# Patient Record
Sex: FEMALE | Race: WHITE | NOT HISPANIC OR LATINO | Employment: UNEMPLOYED | URBAN - METROPOLITAN AREA
[De-identification: names, ages, dates, MRNs, and addresses within clinical notes are randomized per-mention and may not be internally consistent; named-entity substitution may affect disease eponyms.]

---

## 2019-03-21 ENCOUNTER — HOSPITAL ENCOUNTER (OUTPATIENT)
Dept: OTHER | Facility: HOSPITAL | Age: 24
Setting detail: SPECIMEN
Discharge: HOME | End: 2019-03-21
Attending: SURGERY | Admitting: SURGERY

## 2021-06-14 ENCOUNTER — APPOINTMENT (OUTPATIENT)
Dept: CT IMAGING | Facility: HOSPITAL | Age: 26
End: 2021-06-14

## 2021-06-14 ENCOUNTER — HOSPITAL ENCOUNTER (OUTPATIENT)
Facility: HOSPITAL | Age: 26
Setting detail: OBSERVATION
Discharge: HOME | End: 2021-06-16
Attending: INTERNAL MEDICINE | Admitting: INTERNAL MEDICINE

## 2021-06-14 ENCOUNTER — APPOINTMENT (OUTPATIENT)
Dept: GENERAL RADIOLOGY | Facility: HOSPITAL | Age: 26
End: 2021-06-14

## 2021-06-14 DIAGNOSIS — F19.10 SUBSTANCE ABUSE (HCC): ICD-10-CM

## 2021-06-14 DIAGNOSIS — R07.9 CHEST PAIN, UNSPECIFIED TYPE: Primary | ICD-10-CM

## 2021-06-14 LAB
ALBUMIN SERPL-MCNC: 4.1 G/DL (ref 3.5–5.2)
ALBUMIN/GLOB SERPL: 1.1 G/DL
ALP SERPL-CCNC: 121 U/L (ref 39–117)
ALT SERPL W P-5'-P-CCNC: 192 U/L (ref 1–33)
AMPHET+METHAMPHET UR QL: POSITIVE
ANION GAP SERPL CALCULATED.3IONS-SCNC: 13 MMOL/L (ref 5–15)
AST SERPL-CCNC: 271 U/L (ref 1–32)
B-HCG UR QL: NEGATIVE
BACTERIA UR QL AUTO: ABNORMAL /HPF
BARBITURATES UR QL SCN: NEGATIVE
BASOPHILS # BLD AUTO: 0 10*3/MM3 (ref 0–0.2)
BASOPHILS NFR BLD AUTO: 0.5 % (ref 0–1.5)
BENZODIAZ UR QL SCN: NEGATIVE
BILIRUB SERPL-MCNC: 0.8 MG/DL (ref 0–1.2)
BILIRUB UR QL STRIP: ABNORMAL
BUN SERPL-MCNC: 6 MG/DL (ref 6–20)
BUN/CREAT SERPL: 9.5 (ref 7–25)
CALCIUM SPEC-SCNC: 9.2 MG/DL (ref 8.6–10.5)
CANNABINOIDS SERPL QL: POSITIVE
CHLORIDE SERPL-SCNC: 98 MMOL/L (ref 98–107)
CLARITY UR: ABNORMAL
CO2 SERPL-SCNC: 24 MMOL/L (ref 22–29)
COCAINE UR QL: NEGATIVE
COLOR UR: ABNORMAL
CREAT SERPL-MCNC: 0.63 MG/DL (ref 0.57–1)
CRP SERPL-MCNC: 0.83 MG/DL (ref 0–0.5)
D DIMER PPP FEU-MCNC: 0.74 MG/L (FEU) (ref 0–0.59)
D-LACTATE SERPL-SCNC: 1.2 MMOL/L (ref 0.5–2)
DEPRECATED RDW RBC AUTO: 40.3 FL (ref 37–54)
EOSINOPHIL # BLD AUTO: 0.2 10*3/MM3 (ref 0–0.4)
EOSINOPHIL NFR BLD AUTO: 5.3 % (ref 0.3–6.2)
ERYTHROCYTE [DISTWIDTH] IN BLOOD BY AUTOMATED COUNT: 16 % (ref 12.3–15.4)
ERYTHROCYTE [SEDIMENTATION RATE] IN BLOOD: 36 MM/HR (ref 0–20)
GFR SERPL CREATININE-BSD FRML MDRD: 113 ML/MIN/1.73
GLOBULIN UR ELPH-MCNC: 3.7 GM/DL
GLUCOSE SERPL-MCNC: 136 MG/DL (ref 65–99)
GLUCOSE UR STRIP-MCNC: NEGATIVE MG/DL
HCT VFR BLD AUTO: 34 % (ref 34–46.6)
HGB BLD-MCNC: 11.7 G/DL (ref 12–15.9)
HGB UR QL STRIP.AUTO: NEGATIVE
HOLD SPECIMEN: NORMAL
HYALINE CASTS UR QL AUTO: ABNORMAL /LPF
KETONES UR QL STRIP: ABNORMAL
LEUKOCYTE ESTERASE UR QL STRIP.AUTO: ABNORMAL
LYMPHOCYTES # BLD AUTO: 1.3 10*3/MM3 (ref 0.7–3.1)
LYMPHOCYTES NFR BLD AUTO: 40.2 % (ref 19.6–45.3)
MCH RBC QN AUTO: 24.7 PG (ref 26.6–33)
MCHC RBC AUTO-ENTMCNC: 34.3 G/DL (ref 31.5–35.7)
MCV RBC AUTO: 71.8 FL (ref 79–97)
METHADONE UR QL SCN: NEGATIVE
MONOCYTES # BLD AUTO: 0.4 10*3/MM3 (ref 0.1–0.9)
MONOCYTES NFR BLD AUTO: 12.9 % (ref 5–12)
NEUTROPHILS NFR BLD AUTO: 1.3 10*3/MM3 (ref 1.7–7)
NEUTROPHILS NFR BLD AUTO: 41.1 % (ref 42.7–76)
NITRITE UR QL STRIP: NEGATIVE
NRBC BLD AUTO-RTO: 0.2 /100 WBC (ref 0–0.2)
NT-PROBNP SERPL-MCNC: 21.9 PG/ML (ref 0–450)
OPIATES UR QL: NEGATIVE
OXYCODONE UR QL SCN: NEGATIVE
PH UR STRIP.AUTO: 5.5 [PH] (ref 5–8)
PLATELET # BLD AUTO: 226 10*3/MM3 (ref 140–450)
PMV BLD AUTO: 7.2 FL (ref 6–12)
POTASSIUM SERPL-SCNC: 4 MMOL/L (ref 3.5–5.2)
PROT SERPL-MCNC: 7.8 G/DL (ref 6–8.5)
PROT UR QL STRIP: ABNORMAL
QT INTERVAL: 363 MS
RBC # BLD AUTO: 4.73 10*6/MM3 (ref 3.77–5.28)
RBC # UR: ABNORMAL /HPF
REF LAB TEST METHOD: ABNORMAL
RENAL EPI CELLS #/AREA URNS HPF: ABNORMAL /HPF
SODIUM SERPL-SCNC: 135 MMOL/L (ref 136–145)
SP GR UR STRIP: 1.03 (ref 1–1.03)
SQUAMOUS #/AREA URNS HPF: ABNORMAL /HPF
TROPONIN T SERPL-MCNC: <0.01 NG/ML (ref 0–0.03)
UROBILINOGEN UR QL STRIP: ABNORMAL
WBC # BLD AUTO: 3.3 10*3/MM3 (ref 3.4–10.8)
WBC UR QL AUTO: ABNORMAL /HPF

## 2021-06-14 PROCEDURE — 80307 DRUG TEST PRSMV CHEM ANLYZR: CPT | Performed by: NURSE PRACTITIONER

## 2021-06-14 PROCEDURE — 25010000002 DIPHENHYDRAMINE PER 50 MG: Performed by: NURSE PRACTITIONER

## 2021-06-14 PROCEDURE — C9803 HOPD COVID-19 SPEC COLLECT: HCPCS

## 2021-06-14 PROCEDURE — 93005 ELECTROCARDIOGRAM TRACING: CPT

## 2021-06-14 PROCEDURE — 81001 URINALYSIS AUTO W/SCOPE: CPT | Performed by: NURSE PRACTITIONER

## 2021-06-14 PROCEDURE — 0 IOPAMIDOL PER 1 ML: Performed by: NURSE PRACTITIONER

## 2021-06-14 PROCEDURE — G0378 HOSPITAL OBSERVATION PER HR: HCPCS

## 2021-06-14 PROCEDURE — 85379 FIBRIN DEGRADATION QUANT: CPT | Performed by: NURSE PRACTITIONER

## 2021-06-14 PROCEDURE — 84484 ASSAY OF TROPONIN QUANT: CPT | Performed by: NURSE PRACTITIONER

## 2021-06-14 PROCEDURE — 71275 CT ANGIOGRAPHY CHEST: CPT

## 2021-06-14 PROCEDURE — 71045 X-RAY EXAM CHEST 1 VIEW: CPT

## 2021-06-14 PROCEDURE — 96375 TX/PRO/DX INJ NEW DRUG ADDON: CPT

## 2021-06-14 PROCEDURE — 25010000002 VANCOMYCIN 10 G RECONSTITUTED SOLUTION: Performed by: NURSE PRACTITIONER

## 2021-06-14 PROCEDURE — 85025 COMPLETE CBC W/AUTO DIFF WBC: CPT | Performed by: NURSE PRACTITIONER

## 2021-06-14 PROCEDURE — 87040 BLOOD CULTURE FOR BACTERIA: CPT | Performed by: NURSE PRACTITIONER

## 2021-06-14 PROCEDURE — 87086 URINE CULTURE/COLONY COUNT: CPT | Performed by: NURSE PRACTITIONER

## 2021-06-14 PROCEDURE — 99220 PR INITIAL OBSERVATION CARE/DAY 70 MINUTES: CPT | Performed by: INTERNAL MEDICINE

## 2021-06-14 PROCEDURE — 93005 ELECTROCARDIOGRAM TRACING: CPT | Performed by: INTERNAL MEDICINE

## 2021-06-14 PROCEDURE — U0004 COV-19 TEST NON-CDC HGH THRU: HCPCS | Performed by: INTERNAL MEDICINE

## 2021-06-14 PROCEDURE — P9612 CATHETERIZE FOR URINE SPEC: HCPCS

## 2021-06-14 PROCEDURE — 96374 THER/PROPH/DIAG INJ IV PUSH: CPT

## 2021-06-14 PROCEDURE — 25010000002 CEFTRIAXONE PER 250 MG: Performed by: NURSE PRACTITIONER

## 2021-06-14 PROCEDURE — 83605 ASSAY OF LACTIC ACID: CPT

## 2021-06-14 PROCEDURE — 86140 C-REACTIVE PROTEIN: CPT | Performed by: NURSE PRACTITIONER

## 2021-06-14 PROCEDURE — 25010000002 METHYLPREDNISOLONE PER 125 MG: Performed by: NURSE PRACTITIONER

## 2021-06-14 PROCEDURE — 81025 URINE PREGNANCY TEST: CPT | Performed by: NURSE PRACTITIONER

## 2021-06-14 PROCEDURE — 99284 EMERGENCY DEPT VISIT MOD MDM: CPT

## 2021-06-14 PROCEDURE — 83880 ASSAY OF NATRIURETIC PEPTIDE: CPT | Performed by: NURSE PRACTITIONER

## 2021-06-14 PROCEDURE — 80053 COMPREHEN METABOLIC PANEL: CPT | Performed by: NURSE PRACTITIONER

## 2021-06-14 PROCEDURE — 85652 RBC SED RATE AUTOMATED: CPT | Performed by: NURSE PRACTITIONER

## 2021-06-14 RX ORDER — METHYLPREDNISOLONE SODIUM SUCCINATE 125 MG/2ML
125 INJECTION, POWDER, LYOPHILIZED, FOR SOLUTION INTRAMUSCULAR; INTRAVENOUS ONCE
Status: COMPLETED | OUTPATIENT
Start: 2021-06-14 | End: 2021-06-14

## 2021-06-14 RX ORDER — DIPHENHYDRAMINE HYDROCHLORIDE 50 MG/ML
25 INJECTION INTRAMUSCULAR; INTRAVENOUS ONCE
Status: COMPLETED | OUTPATIENT
Start: 2021-06-14 | End: 2021-06-14

## 2021-06-14 RX ORDER — SODIUM CHLORIDE 0.9 % (FLUSH) 0.9 %
10 SYRINGE (ML) INJECTION AS NEEDED
Status: DISCONTINUED | OUTPATIENT
Start: 2021-06-14 | End: 2021-06-16 | Stop reason: HOSPADM

## 2021-06-14 RX ADMIN — VANCOMYCIN HYDROCHLORIDE 1500 MG: 10 INJECTION, POWDER, LYOPHILIZED, FOR SOLUTION INTRAVENOUS at 22:40

## 2021-06-14 RX ADMIN — METHYLPREDNISOLONE SODIUM SUCCINATE 125 MG: 125 INJECTION, POWDER, FOR SOLUTION INTRAMUSCULAR; INTRAVENOUS at 19:41

## 2021-06-14 RX ADMIN — WATER 2 G: 100 INJECTION, SOLUTION INTRAVENOUS at 22:40

## 2021-06-14 RX ADMIN — DIPHENHYDRAMINE HYDROCHLORIDE 25 MG: 50 INJECTION INTRAMUSCULAR; INTRAVENOUS at 19:41

## 2021-06-14 RX ADMIN — IOPAMIDOL 100 ML: 755 INJECTION, SOLUTION INTRAVENOUS at 20:25
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
## 2021-06-15 LAB
ALBUMIN SERPL-MCNC: 3.7 G/DL (ref 3.5–5.2)
ALP SERPL-CCNC: 160 U/L (ref 39–117)
ALT SERPL W P-5'-P-CCNC: 237 U/L (ref 1–33)
AST SERPL-CCNC: 305 U/L (ref 1–32)
BACTERIA SPEC AEROBE CULT: ABNORMAL
BILIRUB CONJ SERPL-MCNC: 0.3 MG/DL (ref 0–0.3)
BILIRUB INDIRECT SERPL-MCNC: 0.2 MG/DL
BILIRUB SERPL-MCNC: 0.5 MG/DL (ref 0–1.2)
CK SERPL-CCNC: 60 U/L (ref 20–180)
GGT SERPL-CCNC: 59 U/L (ref 5–36)
HCV AB SER DONR QL: REACTIVE
HIV1+2 AB SER QL: NORMAL
PROT SERPL-MCNC: 7.1 G/DL (ref 6–8.5)
SARS-COV-2 ORF1AB RESP QL NAA+PROBE: NOT DETECTED

## 2021-06-15 PROCEDURE — G0378 HOSPITAL OBSERVATION PER HR: HCPCS

## 2021-06-15 PROCEDURE — 96372 THER/PROPH/DIAG INJ SC/IM: CPT

## 2021-06-15 PROCEDURE — 25010000002 VANCOMYCIN 1 G RECONSTITUTED SOLUTION 1 EACH VIAL: Performed by: INTERNAL MEDICINE

## 2021-06-15 PROCEDURE — 82977 ASSAY OF GGT: CPT | Performed by: NURSE PRACTITIONER

## 2021-06-15 PROCEDURE — 80076 HEPATIC FUNCTION PANEL: CPT | Performed by: INTERNAL MEDICINE

## 2021-06-15 PROCEDURE — 87522 HEPATITIS C REVRS TRNSCRPJ: CPT | Performed by: INTERNAL MEDICINE

## 2021-06-15 PROCEDURE — 86803 HEPATITIS C AB TEST: CPT | Performed by: INTERNAL MEDICINE

## 2021-06-15 PROCEDURE — 25010000002 ENOXAPARIN PER 10 MG: Performed by: INTERNAL MEDICINE

## 2021-06-15 PROCEDURE — 82550 ASSAY OF CK (CPK): CPT | Performed by: NURSE PRACTITIONER

## 2021-06-15 PROCEDURE — 99225 PR SBSQ OBSERVATION CARE/DAY 25 MINUTES: CPT | Performed by: INTERNAL MEDICINE

## 2021-06-15 PROCEDURE — G0432 EIA HIV-1/HIV-2 SCREEN: HCPCS | Performed by: NURSE PRACTITIONER

## 2021-06-15 RX ORDER — KETOROLAC TROMETHAMINE 30 MG/ML
30 INJECTION, SOLUTION INTRAMUSCULAR; INTRAVENOUS EVERY 6 HOURS PRN
Status: DISCONTINUED | OUTPATIENT
Start: 2021-06-15 | End: 2021-06-16 | Stop reason: HOSPADM

## 2021-06-15 RX ORDER — VALACYCLOVIR HYDROCHLORIDE 500 MG/1
1000 TABLET, FILM COATED ORAL EVERY 12 HOURS SCHEDULED
Status: DISCONTINUED | OUTPATIENT
Start: 2021-06-15 | End: 2021-06-16 | Stop reason: HOSPADM

## 2021-06-15 RX ORDER — ONDANSETRON 2 MG/ML
4 INJECTION INTRAMUSCULAR; INTRAVENOUS EVERY 6 HOURS PRN
Status: DISCONTINUED | OUTPATIENT
Start: 2021-06-15 | End: 2021-06-16 | Stop reason: HOSPADM

## 2021-06-15 RX ORDER — ACETAMINOPHEN 325 MG/1
650 TABLET ORAL EVERY 4 HOURS PRN
Status: DISCONTINUED | OUTPATIENT
Start: 2021-06-15 | End: 2021-06-15

## 2021-06-15 RX ORDER — PANTOPRAZOLE SODIUM 40 MG/1
40 TABLET, DELAYED RELEASE ORAL
Status: DISCONTINUED | OUTPATIENT
Start: 2021-06-15 | End: 2021-06-16 | Stop reason: HOSPADM

## 2021-06-15 RX ORDER — SODIUM CHLORIDE 0.9 % (FLUSH) 0.9 %
10 SYRINGE (ML) INJECTION EVERY 12 HOURS SCHEDULED
Status: DISCONTINUED | OUTPATIENT
Start: 2021-06-15 | End: 2021-06-16 | Stop reason: HOSPADM

## 2021-06-15 RX ORDER — LEVETIRACETAM 500 MG/1
500 TABLET ORAL EVERY 12 HOURS SCHEDULED
Status: DISCONTINUED | OUTPATIENT
Start: 2021-06-15 | End: 2021-06-15

## 2021-06-15 RX ORDER — LEVETIRACETAM 500 MG/1
500 TABLET ORAL EVERY 12 HOURS SCHEDULED
Status: DISCONTINUED | OUTPATIENT
Start: 2021-06-15 | End: 2021-06-16 | Stop reason: HOSPADM

## 2021-06-15 RX ORDER — SODIUM CHLORIDE 0.9 % (FLUSH) 0.9 %
10 SYRINGE (ML) INJECTION AS NEEDED
Status: DISCONTINUED | OUTPATIENT
Start: 2021-06-15 | End: 2021-06-16 | Stop reason: HOSPADM

## 2021-06-15 RX ADMIN — ENOXAPARIN SODIUM 40 MG: 40 INJECTION SUBCUTANEOUS at 15:57

## 2021-06-15 RX ADMIN — VALACYCLOVIR HYDROCHLORIDE 1000 MG: 500 TABLET ORAL at 21:59

## 2021-06-15 RX ADMIN — Medication 10 ML: at 09:46

## 2021-06-15 RX ADMIN — LEVETIRACETAM 500 MG: 500 TABLET, FILM COATED ORAL at 21:59

## 2021-06-15 RX ADMIN — Medication 10 ML: at 21:59

## 2021-06-15 RX ADMIN — Medication 10 ML: at 03:00

## 2021-06-15 RX ADMIN — PANTOPRAZOLE SODIUM 40 MG: 40 TABLET, DELAYED RELEASE ORAL at 10:00

## 2021-06-15 RX ADMIN — LEVETIRACETAM 500 MG: 500 TABLET, FILM COATED ORAL at 03:00

## 2021-06-15 RX ADMIN — VANCOMYCIN HYDROCHLORIDE 1000 MG: 1 INJECTION, POWDER, LYOPHILIZED, FOR SOLUTION INTRAVENOUS at 07:02

## 2021-06-15 RX ADMIN — LEVETIRACETAM 500 MG: 500 TABLET, FILM COATED ORAL at 09:46
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
## 2021-06-15 NOTE — PLAN OF CARE
Goal Outcome Evaluation:  Plan of Care Reviewed With: patient        Progress: no change  Outcome Summary: Pt admitted d/t chest pain recent hospitalization at [facility] d/t endocarditis. pt currently resting w/o pain. pt states she has been homeless for 10 years and unable to purchase medications. pt states she had x3 seizures unwitnessed.  pt to be seen by ID and GI no new complaints at this time. pt reminded to turn.

## 2021-06-15 NOTE — H&P
[facility] Medicine Services      Patient Name: Yoselyn Bonilla  : 1995  MRN: [id number]  Primary Care Physician: Cyndee, No Known  Date of admission: 2021    Patient Care Team:  Provider, No Known as PCP - General          Subjective   History Present Illness     Chief Complaint:   Chief Complaint   Patient presents with   • Chest Pain     chest pain for 2 days, soa, n/v pt also reports 3 seizures in last 24 hours not taking medication due to money reason pt states she smoked meth 2 days ago, doen not use iv drugs anymore.     History of Present Illness  25-year-old  female, homeless patient with history of IVDA , seizure disorder, hepatitis C, anxiety and depression, PTSD, and borderline personality disorder, but is not taking any medications including her Keppra, presented to the ED complaining of chest pain, shortness of breath, nausea, vomiting, and chills.  She also reports having 3 unwitnessed seizures in the last 24 hours.  Patient reports she last smoked meth 2 days ago and last injected 3 to 4 weeks ago.  She was recently seen at [facility] and treated for endocarditis.  Patient states that she was admitted there for about 4 to 5 days treated with antibiotics and when she was discharged she was given a prescription to continue antibiotics but she did not.  She also reports having burning urination today.  Denies any other complaint.    Emergency department course:  Afebrile, hemodynamically stable, no acute distress.  Physical examination per ED NP was unremarkable.  Labs were significant for elevated LFTs, CRP 0.83, D-dimer 0.74, WBC count 3.23 with neutrophils 1.30, and hemoglobin 11.7.  Urine analysis showed moderate leukocyte esterase, 3-5 WBCs and 1+ bacteria.  Urine drug screen positive for methamphetamine and THC.  Patient was given 2 g of Rocephin and 1500 mg of vancomycin.    ROS  Please refer to HPI. All other systems were  reviewed and were negative.     Personal History     Past Medical History:   Past Medical History:   Diagnosis Date   • Hepatitis C        Surgical History:    No past surgical history on file.        Family History: family history is not on file. Otherwise pertinent FHx was reviewed and unremarkable.     Social History:  Homeless.      Medications:  Prior to Admission medications    Not on File       Allergies:    Allergies   Allergen Reactions   • Morphine Anaphylaxis   • Peanut Oil Anaphylaxis   • Sulfa Antibiotics Anaphylaxis   • Topiramate Anaphylaxis   • Iodine Other (See Comments)   • Latex Hives   • Shellfish-Derived Products Rash       Objective   Objective     Vital Signs  Temp:  [97.9 °F (36.6 °C)] 97.9 °F (36.6 °C)  Heart Rate:  [94-96] 94  Resp:  [18-22] 22  BP: (106-128)/(60-79) 106/60  SpO2:  [95 %-100 %] 100 %  on   ;   Device (Oxygen Therapy): room air  Body mass index is 28.38 kg/m².    Physical Exam  General: WD, WN, alert and oriented x3, no acute distress.   Eyes:  Show anicteric sclerae, moist conjunctivae with no lig lag; PERRLA.  HENT:  Normocephalic, atraumatic, moist oral mucosa.  Neck: Supple, no bruit, no JVP, no thyroid or lymph node enlargement, trachea central,   Lungs:  Good air entry. Clear to auscultation.   Heart: RRR, no murmur or rub.   Abdomen:  Soft, mild suprapubic tenderness, not distended, no organomegaly, bowel sounds positive.   Extremities: No leg edema or joint swelling, no calf tenderness, normal range of movement, pedal pulses intact.   Skin: No rash, lesions, or ulcers.  Normal texture and turgor.  Neurology:  Grossly intact.   Psychiatric exam: Pleasant, cooperative, appropriate mood and affect, intact judgment and insight.    Results Review:  I have personally reviewed most recent lab results and radiology images and interpretations and agree with findings, most notably: As below.    Results from last 7 days   Lab Units 06/14/21  1807   WBC 10*3/mm3 3.30*    HEMOGLOBIN g/dL 11.7*   HEMATOCRIT % 34.0   PLATELETS 10*3/mm3 226     Results from last 7 days   Lab Units 06/14/21  1808 06/14/21  1807   SODIUM mmol/L  --  135*   POTASSIUM mmol/L  --  4.0   CHLORIDE mmol/L  --  98   CO2 mmol/L  --  24.0   BUN mg/dL  --  6   CREATININE mg/dL  --  0.63   GLUCOSE mg/dL  --  136*   CALCIUM mg/dL  --  9.2   ALT (SGPT) U/L  --  192*   AST (SGOT) U/L  --  271*   TROPONIN T ng/mL  --  <0.010   PROBNP pg/mL  --  21.9   LACTATE mmol/L 1.2  --      Estimated Creatinine Clearance: 133 mL/min (by C-G formula based on SCr of 0.63 mg/dL).  Brief Urine Lab Results  (Last result in the past 365 days)      Color   Clarity   Blood   Leuk Est   Nitrite   Protein   CREAT   Urine HCG        06/14/21 1827 Dark Yellow  Comment:  Result checked Cloudy  Comment:  Result checked Negative Moderate (2+) Negative Trace         06/14/21 1827               Negative           Microbiology Results (last 10 days)     ** No results found for the last 240 hours. **          ECG/EMG Results (most recent)     Procedure Component Value Units Date/Time    ECG 12 Lead [[id number]] Collected: 06/14/21 1741     Updated: 06/14/21 1818     QT Interval 363 ms     Narrative:      HEART RATE= 89  bpm  RR Interval= 676  ms  GA Interval= 160  ms  P Horizontal Axis= 14  deg  P Front Axis= 31  deg  QRSD Interval= 82  ms  QT Interval= 363  ms  QRS Axis= 39  deg  T Wave Axis= 36  deg  - NORMAL ECG -  Sinus rhythm  Electronically Signed By:   Date and Time of Study: 2021-06-14 17:41:04                  XR Chest 1 View    Result Date: 6/14/2021  No active disease.  Electronically Signed By-Deion Hyde MD On:6/14/2021 6:03 PM This report was finalized on [id number] by  Deion Hyde MD.    CT Chest Pulmonary Embolism    Result Date: 6/14/2021   1. There are no large emboli within the main, right main, or left main pulmonary arteries. There also is normal enhancement of the larger pulmonary arterial branch vessels. It is difficult to  exclude small emboli in the peripheral pulmonary arteries as discussed above. 2. There are nodular densities in the left lower lobe which are likely benign given the patient's age. 3. Mild splenomegaly. 4. Dependent atelectasis in the lung bases.  Electronically Signed By-Deion Hyde MD On:6/14/2021 8:45 PM This report was finalized on [id number] by  Deion Hyde MD.        Estimated Creatinine Clearance: 133 mL/min (by C-G formula based on SCr of 0.63 mg/dL).    Assessment/Plan   Assessment/Plan       Active Hospital Problems    Diagnosis  POA   • Chest pain [R07.9]  Yes      Resolved Hospital Problems   No resolved problems to display.     Assessment:  1.  Chest pain and shortness of breath.  -Recent hospitalization and treatment for bacterial endocarditis 2/2 IVDA.  -Patient did not take antibiotics prescribed for her upon discharge.    2.  Seizure disorder-not taking her Keppra.    3.  UTI.    4.  History of hepatitis C with elevated LFTs.    5.  Hx of IVDA, methamphetamine and cannabis abuse.    6.  Anxiety/depression/PTSD/borderline personality disorder.    Plan:  -Admit to med/surg.  -Consult infectious disease.  -Continue empiric antibiotic coverage with Rocephin and vancomycin pending blood and urine culture results and ID recommendations.  -Consult gastroenterology.  -Check hepatitis C RNA titer and hepatitis C antibody level.  -Resume patient's on Keppra 500 mg p.o. twice daily.  -Consult .  -DVT prophylaxis with subcutaneous Lovenox.            VTE Prophylaxis -   Mechanical Order History:     None      Pharmalogical Order History:     None          CODE STATUS:    There are no questions and answers to display.       This patient has been examined wearing appropriate Personal Protective Equipment and discussed with hospital infection control department. 06/14/21      I discussed the patient's findings and my recommendations with patient.      Signature:Electronically signed by Claudio Martinez  MD, 06/15/21, 1:24 AM EDT.      [facility] Hospitalist Team

## 2021-06-15 NOTE — PROGRESS NOTES
"      [facility] Medicine Services Daily Progress Note      Hospitalist Team  LOS 1 days      Patient Care Team:  Provider, No Known as PCP - General    Patient Location: 224/1      Subjective   Subjective     Chief Complaint / Subjective  Chief Complaint   Patient presents with   • Chest Pain     chest pain for 2 days, soa, n/v pt also reports 3 seizures in last 24 hours not taking medication due to money reason pt states she smoked meth 2 days ago, doen not use iv drugs anymore.         Brief Synopsis of Hospital Course/HPI  Additional review of documentation from the [facility] showed that endocarditis was actually ruled out at their facility and she was only supposed to be on antibiotics for ESBL E. coli UTI and she was to receive 1 dose of outpatient tobramycin is unclear if she actually took this.  Also She was set up to see hepatitis C clinic at [facility]      Date::    6/15/2021: Poor historian will not really answer questions.  Reports she has some mild chest pain.      Review of Systems   Constitutional: Negative for fever.   Cardiovascular: Positive for chest pain.         Objective   Objective      Vital Signs  Temp:  [97.9 °F (36.6 °C)-98.4 °F (36.9 °C)] 98.2 °F (36.8 °C)  Heart Rate:  [] 95  Resp:  [15-22] 18  BP: ()/(56-79) 95/61  Oxygen Therapy  SpO2: 99 %  Pulse Oximetry Type: Intermittent  Device (Oxygen Therapy): room air  Flowsheet Rows      First Filed Value   Admission Height  162.6 cm (64\") Documented at 06/14/2021 1724   Admission Weight  75 kg (165 lb 5.5 oz) Documented at 06/14/2021 1724        Intake & Output (last 3 days)       06/12 0701 - 06/13 0700 06/13 0701 - 06/14 0700 06/14 0701 - 06/15 0700 06/15 0701 - 06/16 0700    P.O.    480    Total Intake(mL/kg)    480 (6.4)    Net    +480                Lines, Drains & Airways    Active LDAs     Name:   Placement date:   Placement time:   Site:   Days:    Peripheral IV 06/15/21 " 0051 Right;Upper Arm   06/15/21    0051    Arm   less than 1                  Physical Exam:    Physical Exam  HENT:      Head: Normocephalic.   Cardiovascular:      Rate and Rhythm: Normal rate.   Pulmonary:      Effort: Pulmonary effort is normal. No respiratory distress.   Abdominal:      General: There is no distension.      Palpations: Abdomen is soft.   Skin:     General: Skin is warm.   Neurological:      Mental Status: She is alert.   Psychiatric:      Comments: Anxious               Procedures:              Results Review:     I reviewed the patient's new clinical results.      Lab Results (last 24 hours)     Procedure Component Value Units Date/Time    CK [[id number]]  (Normal) Collected: 06/15/21 0425    Specimen: Blood Updated: 06/15/21 1433     Creatine Kinase 60 U/L     Gamma GT [[id number]] Collected: 06/15/21 0425    Specimen: Blood Updated: 06/15/21 1421    Urine Culture - Urine, Urine, Catheter [[id number]]  (Normal) Collected: 06/14/21 1827    Specimen: Urine, Catheter Updated: 06/15/21 1057     Urine Culture Culture in progress    Hepatitis C Antibody [[id number]]  (Abnormal) Collected: 06/15/21 0425    Specimen: Blood Updated: 06/15/21 0519     Hepatitis C Ab Reactive    Narrative:      Results may be falsely decreased if patient taking Biotin.      Hepatic Function Panel [[id number]]  (Abnormal) Collected: 06/15/21 0425    Specimen: Blood Updated: 06/15/21 0511     Total Protein 7.1 g/dL      Albumin 3.70 g/dL      ALT (SGPT) 237 U/L      AST (SGOT) 305 U/L      Alkaline Phosphatase 160 U/L      Total Bilirubin 0.5 mg/dL      Bilirubin, Direct 0.3 mg/dL      Bilirubin, Indirect 0.2 mg/dL     Hepatitis C RNA, Quantitative, PCR (graph) [[id number]] Collected: 06/15/21 0425    Specimen: Blood Updated: 06/15/21 0435    COVID PRE-OP / PRE-PROCEDURE SCREENING ORDER (NO ISOLATION) - Swab, Nasopharynx [[id number]] Collected: 06/14/21 2239    Specimen: Swab from Nasopharynx Updated: 06/14/21 2252    Narrative:       The following orders were created for panel order COVID PRE-OP / PRE-PROCEDURE SCREENING ORDER (NO ISOLATION) - Swab, Nasopharynx.  Procedure                               Abnormality         Status                     ---------                               -----------         ------                     COVID-19,APTIMA PANTHER,...[[id number]]                      In process                   Please view results for these tests on the individual orders.    COVID-19,APTIMA PANTHER,RONDA IN-HOUSE, NP/OP SWAB IN UTM/VTM/SALINE TRANSPORT MEDIA,24 HR TAT - Swab, Nasopharynx [[id number]] Collected: 06/14/21 2239    Specimen: Swab from Nasopharynx Updated: 06/14/21 2252    Blood Culture - Blood, Arm, Left [[id number]] Collected: 06/14/21 2153    Specimen: Blood from Arm, Left Updated: 06/14/21 2203    Blood Culture - Blood, Arm, Right [[id number]] Collected: 06/14/21 2201    Specimen: Blood from Arm, Right Updated: 06/14/21 2203    Urine Drug Screen - Urine, Catheter [[id number]]  (Abnormal) Collected: 06/14/21 1827    Specimen: Urine, Catheter Updated: 06/14/21 2123     Amphet/Methamphet, Screen Positive     Barbiturates Screen, Urine Negative     Benzodiazepine Screen, Urine Negative     Cocaine Screen, Urine Negative     Opiate Screen Negative     THC, Screen, Urine Positive     Methadone Screen, Urine Negative     Oxycodone Screen, Urine Negative    Narrative:      Negative Thresholds Per Drugs Screened:    Amphetamines                 500 ng/ml  Barbiturates                 200 ng/ml  Benzodiazepines              100 ng/ml  Cocaine                      300 ng/ml  Methadone                    300 ng/ml  Opiates                      300 ng/ml  Oxycodone                    100 ng/ml  THC                           50 ng/ml    The Normal Value for all drugs tested is negative. This report includes final unconfirmed screening results to be used for medical treatment purposes only. Unconfirmed results must not be used for  non-medical purposes such as employment or legal testing. Clinical consideration should be applied to any drug of abuse test, particularly when unconfirmed results are used.          All urine drugs of abuse requests without chain of custody are for medical screening purposes only.  False positives are possible.          No results found for: HGBA1C            Lab Results   Component Value Date    LIPASE 29 09/13/2020     Lab Results   Component Value Date    CHLPL 148 09/15/2020    TRIG 433 (H) 09/15/2020    HDL 29 (L) 09/15/2020    LDL 74 09/15/2020       No results found for: INTRAOP, PREDX, FINALDX, COMDX    Microbiology Results (last 10 days)     Procedure Component Value - Date/Time    Urine Culture - Urine, Urine, Catheter [[id number]]  (Normal) Collected: 06/14/21 1827    Lab Status: Preliminary result Specimen: Urine, Catheter Updated: 06/15/21 1057     Urine Culture Culture in progress          ECG/EMG Results (most recent)     Procedure Component Value Units Date/Time    ECG 12 Lead [[id number]] Collected: 06/14/21 1741     Updated: 06/14/21 1818     QT Interval 363 ms     Narrative:      HEART RATE= 89  bpm  RR Interval= 676  ms  UT Interval= 160  ms  P Horizontal Axis= 14  deg  P Front Axis= 31  deg  QRSD Interval= 82  ms  QT Interval= 363  ms  QRS Axis= 39  deg  T Wave Axis= 36  deg  - NORMAL ECG -  Sinus rhythm  Electronically Signed By:   Date and Time of Study: 2021-06-14 17:41:04                  XR Chest 1 View    Result Date: 6/14/2021  No active disease.  Electronically Signed By-Deion Hyde MD On:6/14/2021 6:03 PM This report was finalized on [id number] by  Deion Hyde MD.    CT Chest Pulmonary Embolism    Result Date: 6/14/2021   1. There are no large emboli within the main, right main, or left main pulmonary arteries. There also is normal enhancement of the larger pulmonary arterial branch vessels. It is difficult to exclude small emboli in the peripheral pulmonary arteries as discussed  above. 2. There are nodular densities in the left lower lobe which are likely benign given the patient's age. 3. Mild splenomegaly. 4. Dependent atelectasis in the lung bases.  Electronically Signed By-Dieon Hyde MD On:6/14/2021 8:45 PM This report was finalized on [id number] by  Deion Hyde MD.          Xrays, labs reviewed personally by physician.    Medication Review:   I have reviewed the patient's current medication list      Scheduled Meds  enoxaparin, 40 mg, Subcutaneous, Q24H  levETIRAcetam, 500 mg, Oral, Q12H  pantoprazole, 40 mg, Oral, Q AM  sodium chloride, 10 mL, Intravenous, Q12H        Meds Infusions       Meds PRN  ketorolac  •  ondansetron  •  [COMPLETED] Insert peripheral IV **AND** sodium chloride  •  sodium chloride        Assessment/Plan   Assessment/Plan     Active Hospital Problems    Diagnosis  POA   • Chest pain [R07.9]  Yes      Resolved Hospital Problems   No resolved problems to display.       MEDICAL DECISION MAKING COMPLEXITY BY PROBLEM:     Chest pain shortness of breath  -Atypical, be related to continued meth abuse  -Record review showed she did not have endocarditis recently so this was ruled out  -Follow blood culture 6/14/2021 (blood culture on 6/6/2021 in 6/7/2021 were negative)    Hepatitis C, transaminitis  -GI following  -Was supposed to follow-up with you over the hepatitis C clinic  -Follow AST ALT    Polysubstance abuse  - consult    Recent ESBL UTI  -Follow urine culture  -Monitor off antibiotics for now  -ID following    Seizure disorder  -Continue Keppra    Anxiety/depression/PTSD/borderline personality disorder.    VTE Prophylaxis -   Mechanical Order History:     None      Pharmalogical Order History:      Ordered     Dose Route Frequency Stop    06/15/21 0118  enoxaparin (LOVENOX) syringe 40 mg      40 mg SC Every 24 Hours --                  Code Status -   Code Status and Medical Interventions:   Ordered at: 06/15/21 0112     Code Status:    CPR      Medical Interventions (Level of Support Prior to Arrest):    Full       This patient has been examined wearing appropriate Personal Protective Equipment . 06/15/21        Discharge Planning  If labs stabilizes likely discharge soon  and case management to follow        Electronically signed by Bob Bruce DO, 06/15/21, 15:25 EDT.  [facility] Hospitalist Team

## 2021-06-15 NOTE — CONSULTS
"GI CONSULT  NOTE:    Referring Provider:  Dr. Bruce    Chief complaint: Elevated liver enzymes, hepatitis C    Subjective . \"Some pain and nausea\"    History of present illness: Yoselyn Bonilla is a 25 y.o. female who has a history of borderline personality disorder, drug abuse, seizure disorder and medical noncompliance.  Per chart review, patient was recently admitted at [facility] but has not been taking any of her medications and has been using methamphetamines.  Per chart, she has had chest pain and shortness of breath for the last 2 days with some nausea and vomiting as well as multiple seizures.  Again she is not taking her medications.  Unfortunately, patient is not willing to discuss her recent history with us at this time and no other information was provided other than she has had some generalized abdominal pain recently.    Endo History:  Unknown    Past Medical History:  Past Medical History:   Diagnosis Date   • Borderline personality disorder (CMS/HCC)    • Endocarditis    • Hepatitis C    • Mood disorder (CMS/HCC)    • PTSD (post-traumatic stress disorder)    • Seizures (CMS/HCC)        Past Surgical History:  No past surgical history on file.    Social History:  Social History     Tobacco Use   • Smoking status: Never Smoker   • Smokeless tobacco: Never Used   Substance Use Topics   • Alcohol use: Not on file   • Drug use: Yes     Types: IV, Methamphetamines, Marijuana       Family History:  No family history on file.    Medications:  No medications prior to admission.       Scheduled Meds:cefTRIAXone, 2 g, Intravenous, Q24H  enoxaparin, 40 mg, Subcutaneous, Q24H  levETIRAcetam, 500 mg, Oral, Q12H  pantoprazole, 40 mg, Oral, Q AM  sodium chloride, 10 mL, Intravenous, Q12H      Continuous Infusions:   PRN Meds:.ketorolac  •  ondansetron  •  [COMPLETED] Insert peripheral IV **AND** sodium chloride  •  sodium chloride    ALLERGIES:  Morphine, Peanut oil, Sulfa antibiotics, Topiramate, Iodine, Latex, and " "Shellfish-derived products    ROS:  Abdominal pain, unwilling to discuss further symptoms    Objective Sleeping in bed    Vital Signs:   Vitals:    06/14/21 2329 06/15/21 0110 06/15/21 0421 06/15/21 0800   BP:  99/56 104/67 111/69   BP Location:  Left arm Left arm    Patient Position:  Lying Lying    Pulse: 89 98 109 94   Resp:  15 16 18   Temp:  98.4 °F (36.9 °C) 98.4 °F (36.9 °C) 98.2 °F (36.8 °C)   TempSrc:  Oral Oral Oral   SpO2: 96% 100% 96% 97%   Weight:  75.5 kg (166 lb 6.4 oz)     Height:  162.6 cm (64\")         Physical Exam:       General Appearance:   Lethargic, in no acute distress, overweight   Head:    Normocephalic, without obvious abnormality, atraumatic   Throat:   No oral lesions, no thrush, oral mucosa moist   Lungs:     Respirations regular, even and unlabored   Chest Wall:    No abnormalities observed   Abdomen:     Soft, generalized tenderness without rebound, nondistended   Rectal:     Deferred   Extremities:   Moves all extremities, no edema, no cyanosis   Pulses:   Pulses palpable and equal bilaterally   Skin:   No rash, no jaundice, normal palpation       Neurologic:  Lethargic       Results Review:   I reviewed the patient's labs and imaging.  CBC    Results from last 7 days   Lab Units 06/14/21  1807   WBC 10*3/mm3 3.30*   HEMOGLOBIN g/dL 11.7*   PLATELETS 10*3/mm3 226     CMP   Results from last 7 days   Lab Units 06/15/21  0425 06/14/21  1807   SODIUM mmol/L  --  135*   POTASSIUM mmol/L  --  4.0   CHLORIDE mmol/L  --  98   CO2 mmol/L  --  24.0   BUN mg/dL  --  6   CREATININE mg/dL  --  0.63   GLUCOSE mg/dL  --  136*   ALBUMIN g/dL 3.70 4.10   BILIRUBIN mg/dL 0.5 0.8   ALK PHOS U/L 160* 121*   AST (SGOT) U/L 305* 271*   ALT (SGPT) U/L 237* 192*     Cr Clearance Estimated Creatinine Clearance: 133.4 mL/min (by C-G formula based on SCr of 0.63 mg/dL).  Coag     HbA1C   Lab Results   Component Value Date    HGBA1C 6.9 (H) 09/15/2020    HGBA1C 7.1 (H) 08/29/2020     Blood Glucose No results " found for: POCGLU  Infection     UA    Results from last 7 days   Lab Units 06/14/21  1827   NITRITE UA  Negative   WBC UA /HPF 3-5*   BACTERIA UA /HPF 1+*   SQUAM EPITHEL UA /HPF 3-6*     Radiology(recent) XR Chest 1 View    Result Date: 6/14/2021  No active disease.  Electronically Signed By-Deion Hyde MD On:6/14/2021 6:03 PM This report was finalized on [id number] by  Deion Hyde MD.    CT Chest Pulmonary Embolism    Result Date: 6/14/2021   1. There are no large emboli within the main, right main, or left main pulmonary arteries. There also is normal enhancement of the larger pulmonary arterial branch vessels. It is difficult to exclude small emboli in the peripheral pulmonary arteries as discussed above. 2. There are nodular densities in the left lower lobe which are likely benign given the patient's age. 3. Mild splenomegaly. 4. Dependent atelectasis in the lung bases.  Electronically Signed By-Deion Hyde MD On:6/14/2021 8:45 PM This report was finalized on [id number] by  Deion Hyde MD.         ASSESSMENT:  Elevated LFTs/hepatitis C antibody positive  Generalized abdominal pain  Chest pain with dyspnea  Possible UTI  Drug abuse  History of seizure  Borderline personality disorder     PLAN:  Patient apparently presents with a 2-day history of chest pain, shortness of breath, nausea with vomiting, generalized abdominal pain and seizures.  She apparently has not been taking her antibiotics recently for endocarditis or her seizure medications.  She is not willing to offer any information at this time to GI during rounds.  Elevated LFTs noted.  CT chest without obvious pulmonary emboli.  She is currently on Rocephin and was given 1 dose of Vanco in the ER.  On Keppra.  Add PPI and will monitor.    I discussed the patients findings and my recommendations with the patient.    We appreciate the referral    Melida Nuno, SEB  06/15/21  10:35 EDT
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
## 2021-06-15 NOTE — CONSULTS
Infectious Diseases Consult Note    Referring Provider: Bob Bruce DO    Reason for Consultation: Reported endocarditis, UTI    Patient Care Team:  Provider, No Known as PCP - General    Chief complaint dysuria, frequency, chest pain, shortness of breath, nausea, vomiting, seizures    Subjective     The patient has been afebrile for the last 24 hours.  The patient is on room air, hemodynamically stable, and is tolerating antimicrobial therapy.    History of present illness:      This is a 25-year-old female presents the hospital on 6/14/2021 with complaints of chest pain, shortness of breath, nausea, vomiting, unwitnessed seizures, dysuria, chills, and urinary frequency.  Patient states that she had her daughter in 2015 and redosed in January 2021 she has been having issues with frequent UTIs.  Patient reports that she just was at [facility] and was treated for endocarditis but could not continue her antibiotics due to cost.  However our pharmacist called [facility]'s critical care pharmacist and the patient was admitted from 6/6/2021 to 6/9/2021 and blood cultures both from 6/6/2021 and 6/7/2021 are negative in 5 days.  Urine culture in 6/7/2021 did grow ESBL E. coli patient received IV Merrem from 6/6/2021 to 6/9/2021 and had 1 dose of IV tobramycin and was discharged home without any antibiotics.  Patient also had a 2D echo that was negative for any signs of vegetation so patient was ruled out for endocarditis during that admission.  Patient states that she is been feeling poorly for several days.  Patient admits to smoking methamphetamines but quit using IV methamphetamines approximately a month ago.  Patient denies alcohol or tobacco abuse but does use marijuana.    Patient is currently on room air and does not appear to be in acute distress.  Patient been afebrile since admission.  Patient has a creatinine of 0.63, white blood cell count of 3.3, hemoglobin of 11.7 and platelets of 226.  Patient has a history of  being hepatitis C+, you current urinalysis shows +1 bacteria 3-5 white blood cells with urine culture pending, blood cultures are pending and COVID-19 screen is pending.  Drug screen was positive for methamphetamines and THC.  CT PE protocol did not show any obvious PEs but could not rule out peripheral pulmonary embolisms.  Patient is currently on IV Rocephin and IV vancomycin and has antibiotic allergies to sulfa antibiotics.    She has a known history of seizures and takes Keppra, has PTSD, mood disorder, borderline personality disorder, patient has known history of hepatitis C but denies any treatment.    Review of Systems   Review of Systems   Constitutional: Positive for chills and fatigue.   HENT: Negative.    Eyes: Negative.    Respiratory: Positive for chest tightness and shortness of breath.    Cardiovascular: Negative.    Gastrointestinal: Positive for nausea and vomiting.   Endocrine: Negative.    Genitourinary: Positive for dysuria and frequency.   Musculoskeletal: Negative.    Skin: Negative.    Neurological: Positive for seizures.   Psychiatric/Behavioral: Negative.    All other systems reviewed and are negative.      Medications  No medications prior to admission.       History  Past Medical History:   Diagnosis Date   • Borderline personality disorder (CMS/HCC)    • Endocarditis    • Hepatitis C    • Mood disorder (CMS/HCC)    • PTSD (post-traumatic stress disorder)    • Seizures (CMS/HCC)      No past surgical history on file.    Family History  No family history on file.    Social History   reports that she has never smoked. She has never used smokeless tobacco. She reports current drug use. Drugs: IV, Methamphetamines, and Marijuana.    Allergies  Morphine, Peanut oil, Sulfa antibiotics, Topiramate, Iodine, Latex, and Shellfish-derived products    Objective     Vital Signs   Vital Signs (last 24 hours)       06/14 0700  -  06/15 0659 06/15 0700  -  06/15 1450   Most Recent    Temp (°F) 97.9 -   98.4      98.2     98.2 (36.8)    Heart Rate 89 -  109    94 -  95     95    Resp 15 -  22      18     18    BP 99/56 -  128/79    95/61 -  111/69     95/61    SpO2 (%) 95 -  100    97 -  99     99          Physical Exam:  Physical Exam  Vitals and nursing note reviewed.   Constitutional:       General: She is not in acute distress.     Appearance: Normal appearance. She is well-developed and normal weight. She is not diaphoretic.   HENT:      Head: Normocephalic and atraumatic.   Eyes:      General: No scleral icterus.     Extraocular Movements: Extraocular movements intact.      Conjunctiva/sclera: Conjunctivae normal.      Pupils: Pupils are equal, round, and reactive to light.   Cardiovascular:      Rate and Rhythm: Normal rate and regular rhythm.      Heart sounds: Normal heart sounds, S1 normal and S2 normal. No murmur heard.     Pulmonary:      Effort: Pulmonary effort is normal. No respiratory distress.      Breath sounds: Normal breath sounds. No stridor. No wheezing or rales.   Chest:      Chest wall: No tenderness.   Abdominal:      General: Bowel sounds are normal. There is no distension.      Palpations: Abdomen is soft. There is no mass.      Tenderness: There is no abdominal tenderness. There is no guarding.   Genitourinary:     Comments: Patient has some lesions on her labia  Musculoskeletal:         General: No swelling, tenderness or deformity. Normal range of motion.      Cervical back: Neck supple.   Skin:     General: Skin is warm and dry.      Coloration: Skin is not pale.      Findings: No bruising, erythema or rash.      Comments: No obvious septic phenomenon   Neurological:      General: No focal deficit present.      Mental Status: She is alert and oriented to person, place, and time. Mental status is at baseline.      Cranial Nerves: No cranial nerve deficit.   Psychiatric:         Mood and Affect: Mood normal.         Microbiology  Microbiology Results (last 10 days)     Procedure  Component Value - Date/Time    Urine Culture - Urine, Urine, Catheter [[id number]]  (Normal) Collected: 06/14/21 1827    Lab Status: Preliminary result Specimen: Urine, Catheter Updated: 06/15/21 1057     Urine Culture Culture in progress          Laboratory  Results from last 7 days   Lab Units 06/14/21  1807   WBC 10*3/mm3 3.30*   HEMOGLOBIN g/dL 11.7*   HEMATOCRIT % 34.0   PLATELETS 10*3/mm3 226     Results from last 7 days   Lab Units 06/14/21  1807   SODIUM mmol/L 135*   POTASSIUM mmol/L 4.0   CHLORIDE mmol/L 98   CO2 mmol/L 24.0   BUN mg/dL 6   CREATININE mg/dL 0.63   GLUCOSE mg/dL 136*   CALCIUM mg/dL 9.2     Results from last 7 days   Lab Units 06/14/21  1807   SODIUM mmol/L 135*   POTASSIUM mmol/L 4.0   CHLORIDE mmol/L 98   CO2 mmol/L 24.0   BUN mg/dL 6   CREATININE mg/dL 0.63   GLUCOSE mg/dL 136*   CALCIUM mg/dL 9.2     Results from last 7 days   Lab Units 06/15/21  0425   CK TOTAL U/L 60     Results from last 7 days   Lab Units 06/14/21  1807   SED RATE mm/hr 36*           Radiology  Imaging Results (Last 72 Hours)     Procedure Component Value Units Date/Time    CT Chest Pulmonary Embolism [[id number]] Collected: 06/14/21 2039     Updated: 06/14/21 2047    Narrative:         DATE OF EXAM:  6/14/2021 8:21 PM     PROCEDURE:  CT CHEST PULMONARY EMBOLISM-     INDICATIONS:   Elevated d-dimer level, chest pain, shortness of breath, nausea and  vomiting.     COMPARISON:   Chest x-ray performed on 06/14/2021.     TECHNIQUE:  Routine transaxial slices were obtained through the chest after the  intravenous administration of 100 mL of Isovue 370. Reconstructed  coronal and sagittal images were also obtained. Automated exposure  control and iterative construction methods were used.        FINDINGS:  There are no filling defects within the main, right main, or left main  pulmonary arteries. The larger pulmonary arterial branch vessels are  normal. It is difficult to exclude small peripheral pulmonary emboli on  this  exam due to the most of the contrast still located in the left  subclavian vein, left brachiocephalic vein, and the superior vena cava.  There is a mild dependent atelectasis. There are several adjacent  nodular densities in the superior segment of the left lower lobe on  image 62 of series 7. There is also a separate 7 mm nodule in the left  lower lobe adjacent to the descending thoracic aorta and the left atrium  on image 81 of series 7. These are likely benign given the patient's  age. There are no layering pleural effusions. There are no enlarged  hilar or mediastinal lymph nodes. There is residual thymic tissue in the  anterior mediastinum. The patient has an enlarged spleen. It measures  15.1 cm in greatest transaxial diameter and 7.62 cm in thickness. There  are no acute findings seen beneath the hemidiaphragms. There are no  suspicious osteolytic or sclerotic lesions within the bony thorax.       Impression:         1. There are no large emboli within the main, right main, or left main  pulmonary arteries. There also is normal enhancement of the larger  pulmonary arterial branch vessels. It is difficult to exclude small  emboli in the peripheral pulmonary arteries as discussed above.  2. There are nodular densities in the left lower lobe which are likely  benign given the patient's age.  3. Mild splenomegaly.  4. Dependent atelectasis in the lung bases.     Electronically Signed By-Deion Hyde MD On:6/14/2021 8:45 PM  This report was finalized on [id number] by  Deion Hyde MD.    XR Chest 1 View [[id number]] Collected: 06/14/21 1803     Updated: 06/14/21 1805    Narrative:      DATE OF EXAM:  6/14/2021 5:57 PM     PROCEDURE:  XR CHEST 1 VW-     INDICATIONS:  Chest pain.      COMPARISON:  08/15/2016.     TECHNIQUE:   Single radiographic view of the chest was obtained.     FINDINGS:  The heart size is normal. The pulmonary vascular markings are normal.  The lungs and pleural spaces are clear of active  disease.  The bony  thorax is normal for age.       Impression:      No active disease.     Electronically Signed By-Deion Hyde MD On:6/14/2021 6:03 PM  This report was finalized on [id number] by  Deion Hyde MD.          Cardiology      Results Review:  I have reviewed all clinical data, test, lab, and imaging results.       Schedule Meds  enoxaparin, 40 mg, Subcutaneous, Q24H  levETIRAcetam, 500 mg, Oral, Q12H  pantoprazole, 40 mg, Oral, Q AM  sodium chloride, 10 mL, Intravenous, Q12H        Infusion Meds       PRN Meds  ketorolac  •  ondansetron  •  [COMPLETED] Insert peripheral IV **AND** sodium chloride  •  sodium chloride      Assessment/Plan       Assessment    Our service was originally consulted for possible endocarditis but after our pharmacist reviewed the records from [facility], the patient was ruled out by their infectious disease team for endocarditis due to two negative blood cultures taken on 6/6/2021 and 6/7/2021 and a negative 2D echo.    Urinary tract infection-patient was just recently treated with a ESBL E. coli UTI that was susceptible to Merrem, tobramycin, and minocycline.  Patient reportedly had IV Merrem from 6/6/2021 to 6/9/2021 and then received 1 dose of IV tobramycin and was discharged home without antibiotics.  -Urinalysis shows +1 bacteria and 3-5 white blood cells and cultures pending.  She is complaining of frequency and dysuria.    Recent IV drug abuse with methamphetamines.  Patient states she has been clean from IV drug use for approximately a month but continues to smoke methamphetamines.  She also uses marijuana  -Drug screen was positive for methamphetamines and THC    History of genital herpes.  Patient currently has some genital lesions    Known history of hepatitis C that is treatment naïve  -Current hepatitis C antibodies are positive    History of seizures-patient reports 3 unwitnessed seizures in the last several days    History of mood disorder,  borderline personality disorder, PTSD    Plan    Vancomycin was already discontinued by hospitalist and we will discontinue IV Rocephin  Start p.o. Valtrex 1 g twice daily  Waiting on urine culture  Waiting blood cultures  Waiting on hepatitis C PCR  Waiting on COVID-19 screen  HIV screen  Continue supportive care  A.m. labs  Illicit drug abuse cessation  Case discussed with hospitalist  Discussed with pharmacist    Lakisha Tracy, SEB  06/15/21  14:50 EDT

## 2021-06-15 NOTE — PROGRESS NOTES
"Pharmacy Antimicrobial Dosing Service    Subjective:  Yoselyn Bonilla is a 25 y.o.female admitted with chest pain. Pharmacy has been consulted to dose Vancomycin for possible endocarditis.    PMH: recent admission at [facility], concern for endocarditis, hx IVDU      Assessment/Plan    1. Day #1 Vancomycin: Goal -600 mcg*h/mL. Vancomycin 1500mg IV given in ED (~20 mg/kg ABW). Will schedule vancomycin 1g IV q8h (~13 mg/kg ABW) given patient age and renal function. Predicted  and trough 17.6 mcg/mL. Will obtain levels prior to third dose.     2. Day #1 Ceftriaxone: 2g IV q24h.    Will continue to monitor drug levels, renal function, culture and sensitivities, and patient clinical status.       Objective:  Relevant clinical data and objective history reviewed:  162.6 cm (64\")   75.5 kg (166 lb 6.4 oz)   Ideal body weight: 54.7 kg (120 lb 9.5 oz)  Adjusted ideal body weight: 63 kg (138 lb 14.6 oz)  Body mass index is 28.56 kg/m².        Results from last 7 days   Lab Units 06/14/21  1807   CREATININE mg/dL 0.63     Estimated Creatinine Clearance: 133.4 mL/min (by C-G formula based on SCr of 0.63 mg/dL).  No intake/output data recorded.    Results from last 7 days   Lab Units 06/14/21  1807   WBC 10*3/mm3 3.30*     Temperature    06/14/21 1723 06/15/21 0110   Temp: 97.9 °F (36.6 °C) 98.4 °F (36.9 °C)     Baseline culture/source/susceptibility:  Microbiology Results (last 10 days)       ** No results found for the last 240 hours. **              Anti-Infectives (From admission, onward)      Ordered     Dose/Rate Route Frequency Start Stop    06/15/21 0118  cefTRIAXone (ROCEPHIN) 2 g in sodium chloride 0.9 % 100 mL IVPB     Ordering Provider: Claudio Martinez MD    2 g  200 mL/hr over 30 Minutes Intravenous Every 24 Hours 06/15/21 2100 06/18/21 2059    06/15/21 0148  vancomycin (VANCOCIN) 1,000 mg in sodium chloride 0.9 % 250 mL IVPB     Ordering Provider: Claudio Martinez MD    1,000 mg  over 60 Minutes Intravenous Every " 8 Hours 06/15/21 0700 06/18/21 0659    06/15/21 0118  Pharmacy to dose vancomycin     Ordering Provider: Claudio Martinez MD     Does not apply Continuous PRN 06/15/21 0118 06/18/21 0117    06/14/21 2129  vancomycin 1500 mg/500 mL 0.9% NS IVPB ([facility])     Ordering Provider: Shelia Haile APRN    20 mg/kg × 75 kg Intravenous Once 06/14/21 2131 06/14/21 2240 06/14/21 2129  cefTRIAXone (ROCEPHIN) in FI 2 GRAMS/20ml IV PUSH syringe     Ordering Provider: Shelia Haile APRN    2 g  over 5 Minutes Intravenous Once 06/14/21 2131 06/14/21 2245            Nikki Smith RPH  06/15/21 01:48 EDT
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
## 2021-06-16 VITALS
DIASTOLIC BLOOD PRESSURE: 84 MMHG | BODY MASS INDEX: 28.41 KG/M2 | RESPIRATION RATE: 14 BRPM | HEART RATE: 95 BPM | WEIGHT: 166.4 LBS | OXYGEN SATURATION: 98 % | HEIGHT: 64 IN | SYSTOLIC BLOOD PRESSURE: 128 MMHG | TEMPERATURE: 98.3 F

## 2021-06-16 PROBLEM — R07.9 CHEST PAIN: Status: RESOLVED | Noted: 2021-06-14 | Resolved: 2021-06-16

## 2021-06-16 LAB
ALBUMIN SERPL-MCNC: 3.3 G/DL (ref 3.5–5.2)
ALBUMIN/GLOB SERPL: 1.1 G/DL
ALP SERPL-CCNC: 131 U/L (ref 39–117)
ALT SERPL W P-5'-P-CCNC: 266 U/L (ref 1–33)
ANION GAP SERPL CALCULATED.3IONS-SCNC: 8 MMOL/L (ref 5–15)
AST SERPL-CCNC: 257 U/L (ref 1–32)
BASOPHILS # BLD AUTO: 0 10*3/MM3 (ref 0–0.2)
BASOPHILS NFR BLD AUTO: 0.5 % (ref 0–1.5)
BILIRUB SERPL-MCNC: 0.2 MG/DL (ref 0–1.2)
BUN SERPL-MCNC: 8 MG/DL (ref 6–20)
BUN/CREAT SERPL: 13.1 (ref 7–25)
CALCIUM SPEC-SCNC: 8.2 MG/DL (ref 8.6–10.5)
CHLORIDE SERPL-SCNC: 105 MMOL/L (ref 98–107)
CO2 SERPL-SCNC: 23 MMOL/L (ref 22–29)
CREAT SERPL-MCNC: 0.61 MG/DL (ref 0.57–1)
DEPRECATED RDW RBC AUTO: 40.7 FL (ref 37–54)
EOSINOPHIL # BLD AUTO: 0.1 10*3/MM3 (ref 0–0.4)
EOSINOPHIL NFR BLD AUTO: 2.5 % (ref 0.3–6.2)
ERYTHROCYTE [DISTWIDTH] IN BLOOD BY AUTOMATED COUNT: 16 % (ref 12.3–15.4)
GFR SERPL CREATININE-BSD FRML MDRD: 118 ML/MIN/1.73
GLOBULIN UR ELPH-MCNC: 2.9 GM/DL
GLUCOSE SERPL-MCNC: 315 MG/DL (ref 65–99)
HCT VFR BLD AUTO: 30.5 % (ref 34–46.6)
HCV RNA SERPL NAA+PROBE-ACNC: NORMAL IU/ML
HCV RNA SERPL NAA+PROBE-LOG IU: 5.6 LOG10 IU/ML
HGB BLD-MCNC: 10.2 G/DL (ref 12–15.9)
LYMPHOCYTES # BLD AUTO: 1.7 10*3/MM3 (ref 0.7–3.1)
LYMPHOCYTES NFR BLD AUTO: 48.8 % (ref 19.6–45.3)
MCH RBC QN AUTO: 24.4 PG (ref 26.6–33)
MCHC RBC AUTO-ENTMCNC: 33.3 G/DL (ref 31.5–35.7)
MCV RBC AUTO: 73.2 FL (ref 79–97)
MONOCYTES # BLD AUTO: 0.3 10*3/MM3 (ref 0.1–0.9)
MONOCYTES NFR BLD AUTO: 8.9 % (ref 5–12)
NEUTROPHILS NFR BLD AUTO: 1.4 10*3/MM3 (ref 1.7–7)
NEUTROPHILS NFR BLD AUTO: 39.3 % (ref 42.7–76)
NRBC BLD AUTO-RTO: 0.1 /100 WBC (ref 0–0.2)
PLATELET # BLD AUTO: 193 10*3/MM3 (ref 140–450)
PMV BLD AUTO: 7.6 FL (ref 6–12)
POTASSIUM SERPL-SCNC: 4.3 MMOL/L (ref 3.5–5.2)
PROT SERPL-MCNC: 6.2 G/DL (ref 6–8.5)
RBC # BLD AUTO: 4.17 10*6/MM3 (ref 3.77–5.28)
SODIUM SERPL-SCNC: 136 MMOL/L (ref 136–145)
TEST INFORMATION: NORMAL
WBC # BLD AUTO: 3.6 10*3/MM3 (ref 3.4–10.8)

## 2021-06-16 PROCEDURE — 80053 COMPREHEN METABOLIC PANEL: CPT | Performed by: NURSE PRACTITIONER

## 2021-06-16 PROCEDURE — 85025 COMPLETE CBC W/AUTO DIFF WBC: CPT | Performed by: NURSE PRACTITIONER

## 2021-06-16 PROCEDURE — 99217 PR OBSERVATION CARE DISCHARGE MANAGEMENT: CPT | Performed by: INTERNAL MEDICINE

## 2021-06-16 PROCEDURE — G0378 HOSPITAL OBSERVATION PER HR: HCPCS

## 2021-06-16 RX ORDER — VALACYCLOVIR HYDROCHLORIDE 1 G/1
1000 TABLET, FILM COATED ORAL EVERY 12 HOURS SCHEDULED
Qty: 18 TABLET | Refills: 0 | Status: SHIPPED | OUTPATIENT
Start: 2021-06-16 | End: 2021-06-16

## 2021-06-16 RX ORDER — OMEPRAZOLE 20 MG/1
20 CAPSULE, DELAYED RELEASE ORAL DAILY
Qty: 30 CAPSULE | Refills: 0 | Status: SHIPPED | OUTPATIENT
Start: 2021-06-16 | End: 2021-06-16 | Stop reason: SDUPTHER

## 2021-06-16 RX ORDER — LEVETIRACETAM 500 MG/1
500 TABLET ORAL EVERY 12 HOURS SCHEDULED
Qty: 60 TABLET | Refills: 0 | Status: SHIPPED | OUTPATIENT
Start: 2021-06-16

## 2021-06-16 RX ORDER — VALACYCLOVIR HYDROCHLORIDE 1 G/1
1000 TABLET, FILM COATED ORAL EVERY 12 HOURS SCHEDULED
Qty: 18 TABLET | Refills: 0 | Status: SHIPPED | OUTPATIENT
Start: 2021-06-16 | End: 2021-06-25

## 2021-06-16 RX ORDER — LEVETIRACETAM 500 MG/1
500 TABLET ORAL EVERY 12 HOURS SCHEDULED
Qty: 60 TABLET | Refills: 0 | Status: SHIPPED | OUTPATIENT
Start: 2021-06-16 | End: 2021-06-16

## 2021-06-16 RX ORDER — OMEPRAZOLE 20 MG/1
20 CAPSULE, DELAYED RELEASE ORAL DAILY
Qty: 30 CAPSULE | Refills: 0 | Status: SHIPPED | OUTPATIENT
Start: 2021-06-16

## 2021-06-16 RX ADMIN — LEVETIRACETAM 500 MG: 500 TABLET, FILM COATED ORAL at 08:37

## 2021-06-16 RX ADMIN — Medication 10 ML: at 08:37

## 2021-06-16 RX ADMIN — PANTOPRAZOLE SODIUM 40 MG: 40 TABLET, DELAYED RELEASE ORAL at 08:36

## 2021-06-16 RX ADMIN — VALACYCLOVIR HYDROCHLORIDE 1000 MG: 500 TABLET ORAL at 08:36

## 2021-06-16 NOTE — PROGRESS NOTES
" LOS: 1 day   Patient Care Team:  Provider, No Known as PCP - General      Subjective \" Some arm pain\"    Interval History:     Subjective: Patient reports chronic abdominal pain unchanged.  Had a good bowel movement.  No nausea or vomiting tolerating diet.  She does report some arm pain.      ROS:   No chest pain, shortness of breath, or cough.        Medication Review:     Current Facility-Administered Medications:   •  enoxaparin (LOVENOX) syringe 40 mg, 40 mg, Subcutaneous, Q24H, Claudio Martinez MD, 40 mg at 06/15/21 1557  •  ketorolac (TORADOL) injection 30 mg, 30 mg, Intravenous, Q6H PRN, Claudio Martinez MD  •  levETIRAcetam (KEPPRA) tablet 500 mg, 500 mg, Oral, Q12H, Claudio Martinez MD, 500 mg at 06/16/21 0837  •  ondansetron (ZOFRAN) injection 4 mg, 4 mg, Intravenous, Q6H PRN, Claudio Martinez MD  •  pantoprazole (PROTONIX) EC tablet 40 mg, 40 mg, Oral, Q AM, Melida Nuno APRN, 40 mg at 06/16/21 0836  •  [COMPLETED] Insert peripheral IV, , , Once **AND** sodium chloride 0.9 % flush 10 mL, 10 mL, Intravenous, PRN, Shelia Haile APRN  •  sodium chloride 0.9 % flush 10 mL, 10 mL, Intravenous, Q12H, Claudio Martinez MD, 10 mL at 06/16/21 0837  •  sodium chloride 0.9 % flush 10 mL, 10 mL, Intravenous, PRN, Claudio Martinez MD  •  valACYclovir (VALTREX) tablet 1,000 mg, 1,000 mg, Oral, Q12H, Lakisha Tracy APRN, 1,000 mg at 06/16/21 0836      Objective Sleeping in bed, no acute distress, no family present    Vital Signs  Vitals:    06/15/21 0800 06/15/21 1126 06/15/21 2041 06/16/21 0800   BP: 111/69 95/61 116/77 113/71   BP Location:  Left arm Left arm    Patient Position:  Lying Sitting    Pulse: 94 95 95    Resp: 18  18    Temp: 98.2 °F (36.8 °C)   98.1 °F (36.7 °C)   TempSrc: Oral   Oral   SpO2: 97% 99% 100%    Weight:       Height:           Physical Exam:     General Appearance:   Sleeping, in no acute distress, overweight   Head:    Normocephalic, without obvious abnormality   Eyes:          Conjunctivae normal, " anicteric sclera   Throat:   No oral lesions, no thrush, oral mucosa moist   Neck:    supple, no JVD   Lungs:     respirations regular, even and unlabored       Rectal:     Deferred   Extremities:   No edema, no cyanosis   Skin:   No bruising or rash, no jaundice        Results Review:    CBC    Results from last 7 days   Lab Units 06/16/21  0423 06/14/21  1807   WBC 10*3/mm3 3.60 3.30*   HEMOGLOBIN g/dL 10.2* 11.7*   PLATELETS 10*3/mm3 193 226     CMP   Results from last 7 days   Lab Units 06/16/21  0422 06/15/21  0425 06/14/21  1807   SODIUM mmol/L 136  --  135*   POTASSIUM mmol/L 4.3  --  4.0   CHLORIDE mmol/L 105  --  98   CO2 mmol/L 23.0  --  24.0   BUN mg/dL 8  --  6   CREATININE mg/dL 0.61  --  0.63   GLUCOSE mg/dL 315*  --  136*   ALBUMIN g/dL 3.30* 3.70 4.10   BILIRUBIN mg/dL 0.2 0.5 0.8   ALK PHOS U/L 131* 160* 121*   AST (SGOT) U/L 257* 305* 271*   ALT (SGPT) U/L 266* 237* 192*     Cr Clearance Estimated Creatinine Clearance: 137.8 mL/min (by C-G formula based on SCr of 0.61 mg/dL).  [facility]     HbA1C   Lab Results   Component Value Date    HGBA1C 6.9 (H) 09/15/2020    HGBA1C 7.1 (H) 08/29/2020     Blood Glucose No results found for: POCGLU  Infection   Results from last 7 days   Lab Units 06/14/21 2201 06/14/21  2153 06/14/21  1827   BLOODCX  No growth at 24 hours No growth at 24 hours  --    URINECX   --   --  <25,000 CFU/mL Gram Negative Bacilli*     UA    Results from last 7 days   Lab Units 06/14/21  1827   NITRITE UA  Negative   WBC UA /HPF 3-5*   BACTERIA UA /HPF 1+*   SQUAM EPITHEL UA /HPF 3-6*   URINECX  <25,000 CFU/mL Gram Negative Bacilli*     Radiology(recent) XR Chest 1 View    Result Date: 6/14/2021  No active disease.  Electronically Signed By-Deion Hyde MD On:6/14/2021 6:03 PM This report was finalized on [id number] by  Deion Hyde MD.    CT Chest Pulmonary Embolism    Result Date: 6/14/2021   1. There are no large emboli within the main, right main, or left main pulmonary arteries.  There also is normal enhancement of the larger pulmonary arterial branch vessels. It is difficult to exclude small emboli in the peripheral pulmonary arteries as discussed above. 2. There are nodular densities in the left lower lobe which are likely benign given the patient's age. 3. Mild splenomegaly. 4. Dependent atelectasis in the lung bases.  Electronically Signed By-Deion Hyde MD On:6/14/2021 8:45 PM This report was finalized on [id number] by  Deion Hyde MD.         Assessment/Plan   Elevated LFTs/hepatitis C antibody positive  Generalized abdominal pain  Chest pain with dyspnea  Microcytic anemia  UTI  Polysubstance abuse  History of seizure  Genital herpes  Borderline personality disorder      PLAN:  No further nausea or vomiting and tolerating diet.  Good bowel movement.  LFTs noted.  GGT mildly elevated and CK normal.  Hepatitis C RNA pending.  HIV negative.  Infectious disease work-up in progress.  Continue supportive care.      Melida Nuno, SEB  06/16/21  10:31 EDT
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
## 2021-06-16 NOTE — PLAN OF CARE
Problem: Adult Inpatient Plan of Care  Goal: Plan of Care Review  Outcome: Ongoing, Progressing  Goal: Patient-Specific Goal (Individualized)  Outcome: Ongoing, Progressing  Goal: Absence of Hospital-Acquired Illness or Injury  Outcome: Ongoing, Progressing  Intervention: Identify and Manage Fall Risk  Recent Flowsheet Documentation  Taken 6/15/2021 2100 by Dominga Blanco RN  Safety Promotion/Fall Prevention:   safety round/check completed   fall prevention program maintained   clutter free environment maintained   activity supervised  Taken 6/15/2021 1901 by Dominga Blanco RN  Safety Promotion/Fall Prevention:   safety round/check completed   room organization consistent   nonskid shoes/slippers when out of bed   lighting adjusted   fall prevention program maintained   clutter free environment maintained   assistive device/personal items within reach  Intervention: Prevent Skin Injury  Recent Flowsheet Documentation  Taken 6/15/2021 1901 by Dominga Blanco RN  Skin Protection: adhesive use limited  Intervention: Prevent and Manage VTE (venous thromboembolism) Risk  Recent Flowsheet Documentation  Taken 6/15/2021 1901 by Dominga Blanco RN  VTE Prevention/Management:   sequential compression devices off   patient refused intervention  Intervention: Prevent Infection  Recent Flowsheet Documentation  Taken 6/15/2021 2100 by Dominga Blanco RN  Infection Prevention:   visitors restricted/screened   single patient room provided   rest/sleep promoted   personal protective equipment utilized   hand hygiene promoted   environmental surveillance performed  Taken 6/15/2021 1901 by Dominga Blanco RN  Infection Prevention:   single patient room provided   rest/sleep promoted   personal protective equipment utilized   hand hygiene promoted   equipment surfaces disinfected   environmental surveillance performed  Goal: Optimal Comfort and Wellbeing  Outcome: Ongoing, Progressing  Intervention: Provide Person-Centered  Care  Recent Flowsheet Documentation  Taken 6/15/2021 1901 by Dominga Blanco, RN  Trust Relationship/Rapport:   care explained   empathic listening provided   emotional support provided   questions answered   questions encouraged   thoughts/feelings acknowledged   reassurance provided     Problem: Seizure Disorder Comorbidity  Goal: Maintenance of Seizure Control  Intervention: Maintain Seizure-Symptom Control  Recent Flowsheet Documentation  Taken 6/15/2021 2100 by Dominga Blanco, RN  Seizure Precautions:   activity supervised   clutter-free environment maintained   side rails padded  Taken 6/15/2021 1901 by Dominga Blanco, RN  Seizure Precautions:   activity supervised   clutter-free environment maintained   side rails padded   Goal Outcome Evaluation:

## 2021-06-16 NOTE — NURSING NOTE
After reviewing pt EMR imported from [place] DM,. Bi-Polar, and Schizo type schizophrenia noted. Will place call to hospitalist regarding need for blood glucose monitoring.

## 2021-06-16 NOTE — DISCHARGE SUMMARY
Date of Admission: 6/14/2021    Date of Discharge:  6/16/2021    Length of stay:  LOS: 1 day     Presenting Problem:   Substance abuse (CMS/HCC) [F19.10]  Chest pain, unspecified type [R07.9]      Active Diagnosis During Hospital Stay/Discharge Diagnoses/Course by Diagnoses:    Chest pain shortness of breath  -Atypical, be related to continued meth abuse  -Resolved  -Record review showed she did not have endocarditis recently so this was ruled out  -blood culture 6/14/2021 no growth thus far (blood culture on 6/6/2021 in 6/7/2021 were negative)     Hepatitis C, transaminitis  -GI followed  -Recommended to continue to follow-up with [facility] hepatitis C clinic as was arranged at discharge from recent [facility] hospitalization  -AST/ALT high but stable  -Continue PPI     Polysubstance abuse  - followed, was not agreeable to [place] substance abuse facilities and [place] facilities would not accept insurance     Recent ESBL UTI  -Urine culture less than 25,000 colonies GNR and thus not adequate to treat  -Monitor off antibiotics for now  -ID followed     Seizure disorder  -Continue Keppra     Anxiety/depression/PTSD/borderline personality disorder.          Active Hospital Problems   No active problems to display.      Resolved Hospital Problems    Diagnosis Date Resolved POA   • Chest pain [R07.9] 06/16/2021 Yes         Hospital Course  Patient is a 25 y.o. female presented with complaints of chest pain.  Originally it was reported that she was possibly discharged from [facility] with endocarditis however,Additional review of documentation from the [facility] showed that endocarditis was actually ruled out at their facility and she was only supposed to be on antibiotics for ESBL E. coli UTI and she was to receive 1 dose of outpatient tobramycin is unclear if she actually took this.  Also She was set up to see hepatitis C clinic at [facility].    She was admitted here treated work-up was mostly unremarkable except her urine drug screen was positive as she been continuing have polysubstance abuse.  Also AST and ALT were elevated but were overall stable.  She needs to continue to follow-up with the hepatitis C clinic at [facility].   followed tried to arrange for substance abuse treatment facilities in [place] but the patient did not want those and insurance would not pay for an [place] facility.   will work with the patient on setting her up with the shelter and she was felt stable to discharge.  Unfortunately if she continues to have polysubstance abuse she is at high risk for readmission and further clinical worsening of her medical condition outpatient.      Procedures Performed:as noted          Consults:   Consults     Date and Time Order Name Status Description    6/15/2021  1:18 AM Inpatient Gastroenterology Consult Completed     6/15/2021  1:18 AM Inpatient Infectious Diseases Consult Completed     6/14/2021  9:30 PM Hospitalist (on-call MD unless specified) Completed           Pertinent Test Results:     Results from last 7 days   Lab Units 06/16/21  0423 06/14/21  1807   WBC 10*3/mm3 3.60 3.30*   HEMOGLOBIN g/dL 10.2* 11.7*   HEMATOCRIT % 30.5* 34.0   PLATELETS 10*3/mm3 193 226     Results from last 7 days   Lab Units 06/16/21  0422 06/15/21  0425 06/14/21  1807   SODIUM mmol/L 136  --  135*   POTASSIUM mmol/L 4.3  --  4.0   CHLORIDE mmol/L 105  --  98   CO2 mmol/L 23.0  --  24.0   BUN mg/dL 8  --  6   CREATININE mg/dL 0.61  --  0.63   CALCIUM mg/dL 8.2*  --  9.2   BILIRUBIN mg/dL 0.2 0.5 0.8   ALK PHOS U/L 131* 160* 121*   ALT (SGPT) U/L 266* 237* 192*   AST (SGOT) U/L 257* 305* 271*   GLUCOSE mg/dL 315*  --  136*       Microbiology Results (last 10 days)     Procedure Component Value - Date/Time    COVID PRE-OP / PRE-PROCEDURE SCREENING ORDER (NO ISOLATION) - Swab, Nasopharynx [[id number]]   (Normal) Collected: 06/14/21 2239    Lab Status: Final result Specimen: Swab from Nasopharynx Updated: 06/15/21 1600    Narrative:      The following orders were created for panel order COVID PRE-OP / PRE-PROCEDURE SCREENING ORDER (NO ISOLATION) - Swab, Nasopharynx.  Procedure                               Abnormality         Status                     ---------                               -----------         ------                     COVID-19,APTIMA PANTHER,...[[id number]]  Normal              Final result                 Please view results for these tests on the individual orders.    COVID-19,APTIMA PANTHER,RONDA IN-HOUSE, NP/OP SWAB IN UTM/VTM/SALINE TRANSPORT MEDIA,24 HR TAT - Swab, Nasopharynx [[id number]]  (Normal) Collected: 06/14/21 2239    Lab Status: Final result Specimen: Swab from Nasopharynx Updated: 06/15/21 1600     COVID19 Not Detected    Narrative:      Fact sheet for providers: https://www.fda.gov/media/370833/download     Fact sheet for patients: https://www.fda.gov/media/651951/download    Test performed by RT PCR.    Blood Culture - Blood, Arm, Right [[id number]] Collected: 06/14/21 2201    Lab Status: Preliminary result Specimen: Blood from Arm, Right Updated: 06/15/21 2215     Blood Culture No growth at 24 hours    Blood Culture - Blood, Arm, Left [[id number]] Collected: 06/14/21 2153    Lab Status: Preliminary result Specimen: Blood from Arm, Left Updated: 06/15/21 2215     Blood Culture No growth at 24 hours    Urine Culture - Urine, Urine, Catheter [[id number]]  (Abnormal) Collected: 06/14/21 1827    Lab Status: Final result Specimen: Urine, Catheter Updated: 06/15/21 2043     Urine Culture <25,000 CFU/mL Gram Negative Bacilli    Narrative:      Call if further workup needed.                Imaging Results (All)     Procedure Component Value Units Date/Time    CT Chest Pulmonary Embolism [[id number]] Collected: 06/14/21 2039     Updated: 06/14/21 2047    Narrative:         DATE OF  EXAM:  6/14/2021 8:21 PM     PROCEDURE:  CT CHEST PULMONARY EMBOLISM-     INDICATIONS:   Elevated d-dimer level, chest pain, shortness of breath, nausea and  vomiting.     COMPARISON:   Chest x-ray performed on 06/14/2021.     TECHNIQUE:  Routine transaxial slices were obtained through the chest after the  intravenous administration of 100 mL of Isovue 370. Reconstructed  coronal and sagittal images were also obtained. Automated exposure  control and iterative construction methods were used.        FINDINGS:  There are no filling defects within the main, right main, or left main  pulmonary arteries. The larger pulmonary arterial branch vessels are  normal. It is difficult to exclude small peripheral pulmonary emboli on  this exam due to the most of the contrast still located in the left  subclavian vein, left brachiocephalic vein, and the superior vena cava.  There is a mild dependent atelectasis. There are several adjacent  nodular densities in the superior segment of the left lower lobe on  image 62 of series 7. There is also a separate 7 mm nodule in the left  lower lobe adjacent to the descending thoracic aorta and the left atrium  on image 81 of series 7. These are likely benign given the patient's  age. There are no layering pleural effusions. There are no enlarged  hilar or mediastinal lymph nodes. There is residual thymic tissue in the  anterior mediastinum. The patient has an enlarged spleen. It measures  15.1 cm in greatest transaxial diameter and 7.62 cm in thickness. There  are no acute findings seen beneath the hemidiaphragms. There are no  suspicious osteolytic or sclerotic lesions within the bony thorax.       Impression:         1. There are no large emboli within the main, right main, or left main  pulmonary arteries. There also is normal enhancement of the larger  pulmonary arterial branch vessels. It is difficult to exclude small  emboli in the peripheral pulmonary arteries as discussed above.  2.  There are nodular densities in the left lower lobe which are likely  benign given the patient's age.  3. Mild splenomegaly.  4. Dependent atelectasis in the lung bases.     Electronically Signed By-Deion Hyde MD On:6/14/2021 8:45 PM  This report was finalized on [id number] by  Deion Hyde MD.    XR Chest 1 View [[id number]] Collected: 06/14/21 1803     Updated: 06/14/21 1805    Narrative:      DATE OF EXAM:  6/14/2021 5:57 PM     PROCEDURE:  XR CHEST 1 VW-     INDICATIONS:  Chest pain.      COMPARISON:  08/15/2016.     TECHNIQUE:   Single radiographic view of the chest was obtained.     FINDINGS:  The heart size is normal. The pulmonary vascular markings are normal.  The lungs and pleural spaces are clear of active disease.  The bony  thorax is normal for age.       Impression:      No active disease.     Electronically Signed By-Deion Hyde MD On:6/14/2021 6:03 PM  This report was finalized on [id number] by  Deion Hyde MD.            Condition on Discharge:  stable    Vital Signs  Temp:  [98.1 °F (36.7 °C)-98.3 °F (36.8 °C)] 98.3 °F (36.8 °C)  Heart Rate:  [95] 95  Resp:  [14-18] 14  BP: (113-128)/(71-84) 128/84    Physical Exam:  Physical Exam  Constitutional:       General: She is not in acute distress.  Cardiovascular:      Rate and Rhythm: Normal rate.   Abdominal:      Palpations: Abdomen is soft.      Tenderness: There is no abdominal tenderness.   Skin:     General: Skin is warm.   Neurological:      Mental Status: She is alert and oriented to person, place, and time.   Psychiatric:         Mood and Affect: Mood normal.         Discharge Disposition  Home or Self Care    Discharge Medications     Discharge Medications      New Medications      Instructions Start Date   levETIRAcetam 500 MG tablet  Commonly known as: KEPPRA   500 mg, Oral, Every 12 Hours Scheduled      omeprazole 20 MG capsule  Commonly known as: PrilOSEC   20 mg, Oral, Daily      valACYclovir 1000 MG tablet  Commonly known as:  VALTREX   1,000 mg, Oral, Every 12 Hours Scheduled             Discharge Diet:   Diet Instructions     Diet: Regular      Discharge Diet: Regular          Activity at Discharge:   Activity Instructions     Gradually Increase Activity Until at Pre-Hospitalization Level            Follow-up Appointments  No future appointments.  Additional Instructions for the Follow-ups that You Need to Schedule     Discharge Follow-up with PCP   As directed       Currently Documented PCP:    Provider, No Known    PCP Phone Number:    None     Follow Up Details: one week               Test Results Pending at Discharge  Pending Labs     Order Current Status    Hepatitis C RNA, Quantitative, PCR (graph) In process    Blood Culture - Blood, Arm, Left Preliminary result    Blood Culture - Blood, Arm, Right Preliminary result           Risk for Readmission (LACE) Score: 2 (6/16/2021  6:02 AM)      This patient has been examined wearing appropriate Personal Protective Equipment . 06/16/21        Electronically signed by Bob Bruce DO, 06/16/21, 13:09 EDT.

## 2021-06-16 NOTE — CASE MANAGEMENT/SOCIAL WORK
Continued Stay Note  LENI Sandoval     Patient Name: Yoselyn Bonilla  MRN: [id number]  Today's Date: 6/16/2021    Admit Date: 6/14/2021    Discharge Plan     Row Name 06/16/21 1115       Plan    Plan Comments  Per discussion with bedside RN, pt not likely to d/c on this date (barriers = liver enzymes and GI and ID following). SW notified bedside RN to notify if pt does get d/c orders today so SW can assist with d/c placement at shelter (unless pt willing to consider [place] substance abuse treatment or unless pt no longer wishes to do substance abuse treatment or shelter placement) and medication assistance if needed - will check price of d/c meds with retail pharmacy.        Phone communication or documentation only - no physical contact with patient or family.    HEMAL Beckman, W    Office: [phone]  Cell: [phone]  Fax: [phone]  E-mail: [email or web address]
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
## 2021-06-17 NOTE — CASE MANAGEMENT/SOCIAL WORK
Case Management Discharge Note      [facility]         Selected Continued Care - Discharged on 6/16/2021 Admission date: 6/14/2021 - Discharge disposition: Home or Self Care                     Final Discharge Disposition Code: 01 - home or self-care

## 2021-06-19 LAB
BACTERIA SPEC AEROBE CULT: NORMAL
BACTERIA SPEC AEROBE CULT: NORMAL

## 2021-08-09 ENCOUNTER — HOSPITAL ENCOUNTER (EMERGENCY)
Facility: HOSPITAL | Age: 26
Discharge: HOME | End: 2021-08-09
Attending: EMERGENCY MEDICINE | Admitting: EMERGENCY MEDICINE

## 2021-08-09 ENCOUNTER — APPOINTMENT (OUTPATIENT)
Dept: GENERAL RADIOLOGY | Facility: HOSPITAL | Age: 26
End: 2021-08-09

## 2021-08-09 VITALS
TEMPERATURE: 98.2 F | SYSTOLIC BLOOD PRESSURE: 114 MMHG | DIASTOLIC BLOOD PRESSURE: 69 MMHG | RESPIRATION RATE: 20 BRPM | OXYGEN SATURATION: 96 % | WEIGHT: 159.83 LBS | BODY MASS INDEX: 28.32 KG/M2 | HEIGHT: 63 IN | HEART RATE: 86 BPM

## 2021-08-09 DIAGNOSIS — F19.10 SUBSTANCE ABUSE (HCC): ICD-10-CM

## 2021-08-09 DIAGNOSIS — R07.89 LEFT-SIDED CHEST WALL PAIN: Primary | ICD-10-CM

## 2021-08-09 LAB
ALBUMIN SERPL-MCNC: 3.8 G/DL (ref 3.5–5.2)
ALBUMIN/GLOB SERPL: 1 G/DL
ALP SERPL-CCNC: 110 U/L (ref 39–117)
ALT SERPL W P-5'-P-CCNC: 63 U/L (ref 1–33)
AMPHET+METHAMPHET UR QL: POSITIVE
ANION GAP SERPL CALCULATED.3IONS-SCNC: 12.2 MMOL/L (ref 5–15)
ANISOCYTOSIS BLD QL: NORMAL
APAP SERPL-MCNC: <5 MCG/ML (ref 0–30)
AST SERPL-CCNC: 34 U/L (ref 1–32)
BARBITURATES UR QL SCN: NEGATIVE
BENZODIAZ UR QL SCN: NEGATIVE
BILIRUB SERPL-MCNC: 0.3 MG/DL (ref 0–1.2)
BUN SERPL-MCNC: 9 MG/DL (ref 6–20)
BUN/CREAT SERPL: 14.8 (ref 7–25)
CALCIUM SPEC-SCNC: 9.5 MG/DL (ref 8.6–10.5)
CANNABINOIDS SERPL QL: POSITIVE
CHLORIDE SERPL-SCNC: 99 MMOL/L (ref 98–107)
CK MB SERPL-CCNC: 1.02 NG/ML
CK SERPL-CCNC: 25 U/L (ref 20–180)
CO2 SERPL-SCNC: 22.8 MMOL/L (ref 22–29)
COCAINE UR QL: NEGATIVE
CREAT SERPL-MCNC: 0.61 MG/DL (ref 0.57–1)
DEPRECATED RDW RBC AUTO: 38.5 FL (ref 37–54)
EOSINOPHIL # BLD MANUAL: 0.12 10*3/MM3 (ref 0–0.4)
EOSINOPHIL NFR BLD MANUAL: 2 % (ref 0.3–6.2)
ERYTHROCYTE [DISTWIDTH] IN BLOOD BY AUTOMATED COUNT: 14.7 % (ref 12.3–15.4)
ETHANOL BLD-MCNC: <10 MG/DL (ref 0–10)
ETHANOL UR QL: <0.01 %
GFR SERPL CREATININE-BSD FRML MDRD: 118 ML/MIN/1.73
GLOBULIN UR ELPH-MCNC: 4 GM/DL
GLUCOSE SERPL-MCNC: 195 MG/DL (ref 65–99)
HCT VFR BLD AUTO: 36.9 % (ref 34–46.6)
HGB BLD-MCNC: 11.2 G/DL (ref 12–15.9)
HOLD SPECIMEN: NORMAL
HOLD SPECIMEN: NORMAL
HYPOCHROMIA BLD QL: NORMAL
LARGE PLATELETS: NORMAL
LIPASE SERPL-CCNC: 20 U/L (ref 13–60)
LYMPHOCYTES # BLD MANUAL: 2.22 10*3/MM3 (ref 0.7–3.1)
LYMPHOCYTES NFR BLD MANUAL: 36 % (ref 19.6–45.3)
LYMPHOCYTES NFR BLD MANUAL: 8 % (ref 5–12)
MAGNESIUM SERPL-MCNC: 1.6 MG/DL (ref 1.6–2.6)
MCH RBC QN AUTO: 22.6 PG (ref 26.6–33)
MCHC RBC AUTO-ENTMCNC: 30.4 G/DL (ref 31.5–35.7)
MCV RBC AUTO: 74.5 FL (ref 79–97)
METHADONE UR QL SCN: NEGATIVE
MICROCYTES BLD QL: NORMAL
MONOCYTES # BLD AUTO: 0.48 10*3/MM3 (ref 0.1–0.9)
NEUTROPHILS # BLD AUTO: 3.19 10*3/MM3 (ref 1.7–7)
NEUTROPHILS NFR BLD MANUAL: 53 % (ref 42.7–76)
NRBC BLD AUTO-RTO: 0 /100 WBC (ref 0–0.2)
NT-PROBNP SERPL-MCNC: 31.3 PG/ML (ref 0–450)
OPIATES UR QL: NEGATIVE
OXYCODONE UR QL SCN: NEGATIVE
PLATELET # BLD AUTO: 234 10*3/MM3 (ref 140–450)
PMV BLD AUTO: 9 FL (ref 6–12)
POTASSIUM SERPL-SCNC: 4 MMOL/L (ref 3.5–5.2)
PROT SERPL-MCNC: 7.8 G/DL (ref 6–8.5)
RBC # BLD AUTO: 4.95 10*6/MM3 (ref 3.77–5.28)
SALICYLATES SERPL-MCNC: <0.3 MG/DL
SARS-COV-2 RNA RESP QL NAA+PROBE: NOT DETECTED
SMALL PLATELETS BLD QL SMEAR: ADEQUATE
SODIUM SERPL-SCNC: 134 MMOL/L (ref 136–145)
TROPONIN I SERPL-MCNC: 0 NG/ML (ref 0–0.6)
TROPONIN I SERPL-MCNC: 0 NG/ML (ref 0–0.6)
VARIANT LYMPHS NFR BLD MANUAL: 1 % (ref 0–5)
WBC # BLD AUTO: 6.01 10*3/MM3 (ref 3.4–10.8)
WBC MORPH BLD: NORMAL
WHOLE BLOOD HOLD SPECIMEN: NORMAL

## 2021-08-09 PROCEDURE — 84484 ASSAY OF TROPONIN QUANT: CPT

## 2021-08-09 PROCEDURE — 83880 ASSAY OF NATRIURETIC PEPTIDE: CPT | Performed by: EMERGENCY MEDICINE

## 2021-08-09 PROCEDURE — 82553 CREATINE MB FRACTION: CPT | Performed by: EMERGENCY MEDICINE

## 2021-08-09 PROCEDURE — 80307 DRUG TEST PRSMV CHEM ANLYZR: CPT | Performed by: EMERGENCY MEDICINE

## 2021-08-09 PROCEDURE — 80143 DRUG ASSAY ACETAMINOPHEN: CPT | Performed by: EMERGENCY MEDICINE

## 2021-08-09 PROCEDURE — 80179 DRUG ASSAY SALICYLATE: CPT | Performed by: EMERGENCY MEDICINE

## 2021-08-09 PROCEDURE — 71045 X-RAY EXAM CHEST 1 VIEW: CPT

## 2021-08-09 PROCEDURE — 83735 ASSAY OF MAGNESIUM: CPT | Performed by: EMERGENCY MEDICINE

## 2021-08-09 PROCEDURE — 82550 ASSAY OF CK (CPK): CPT | Performed by: EMERGENCY MEDICINE

## 2021-08-09 PROCEDURE — 83690 ASSAY OF LIPASE: CPT | Performed by: EMERGENCY MEDICINE

## 2021-08-09 PROCEDURE — 93005 ELECTROCARDIOGRAM TRACING: CPT | Performed by: EMERGENCY MEDICINE

## 2021-08-09 PROCEDURE — C9803 HOPD COVID-19 SPEC COLLECT: HCPCS | Performed by: EMERGENCY MEDICINE

## 2021-08-09 PROCEDURE — 82077 ASSAY SPEC XCP UR&BREATH IA: CPT | Performed by: EMERGENCY MEDICINE

## 2021-08-09 PROCEDURE — 80053 COMPREHEN METABOLIC PANEL: CPT | Performed by: EMERGENCY MEDICINE

## 2021-08-09 PROCEDURE — U0003 INFECTIOUS AGENT DETECTION BY NUCLEIC ACID (DNA OR RNA); SEVERE ACUTE RESPIRATORY SYNDROME CORONAVIRUS 2 (SARS-COV-2) (CORONAVIRUS DISEASE [COVID-19]), AMPLIFIED PROBE TECHNIQUE, MAKING USE OF HIGH THROUGHPUT TECHNOLOGIES AS DESCRIBED BY CMS-2020-01-R: HCPCS | Performed by: EMERGENCY MEDICINE

## 2021-08-09 PROCEDURE — 85007 BL SMEAR W/DIFF WBC COUNT: CPT | Performed by: EMERGENCY MEDICINE

## 2021-08-09 PROCEDURE — 85025 COMPLETE CBC W/AUTO DIFF WBC: CPT | Performed by: EMERGENCY MEDICINE

## 2021-08-09 PROCEDURE — 99283 EMERGENCY DEPT VISIT LOW MDM: CPT

## 2021-08-09 PROCEDURE — 93005 ELECTROCARDIOGRAM TRACING: CPT

## 2021-08-09 RX ORDER — ASPIRIN 81 MG/1
324 TABLET, CHEWABLE ORAL ONCE
Status: COMPLETED | OUTPATIENT
Start: 2021-08-09 | End: 2021-08-09

## 2021-08-09 RX ORDER — SODIUM CHLORIDE 0.9 % (FLUSH) 0.9 %
10 SYRINGE (ML) INJECTION AS NEEDED
Status: DISCONTINUED | OUTPATIENT
Start: 2021-08-09 | End: 2021-08-09 | Stop reason: HOSPADM

## 2021-08-09 RX ADMIN — ASPIRIN 324 MG: 81 TABLET, CHEWABLE ORAL at 12:06

## 2021-08-09 NOTE — SIGNIFICANT NOTE
"   08/09/21 1422   Behavior WDL   Behavior WDL WDL   Emotion Mood WDL   Emotion/Mood/Affect WDL WDL   Speech WDL   Speech WDL WDL   Perceptual State WDL   Perceptual State WDL WDL   Thought Process WDL   Thought Process WDL WDL   Intellectual Performance WDL   Intellectual Performance WDL WDL   Level of Consciousness Alert   Coping/Stress   Major Change/Loss/Stressor financial;chemical dependency/abuse  (current iv drug user)   Patient Personal Strengths future/goal oriented  (wants to \"get sober\")   Sources of Support other family members   Techniques to [place] with Loss/Stress/Change substance use   Reaction to Health Status anxious   Understanding of Condition and Treatment poor grasp of illness/implications   Pt states that she has been using heroin and \"ice\". Pt reports that she would like to get sober and is interested in detox facility. Pt states that she has previously been to rehab. At this however pt does not wish for SW to assist with placement. Pt asked for phone to call her uncle who agreed to pick her up. Pt denies SI at this time and request to be discharged. SW discussed with provider.   "

## 2021-08-09 NOTE — DISCHARGE INSTRUCTIONS
Take Tylenol or ibuprofen for pain.  Follow-up with your doctor in 2 days if no better.  Do not smoke or use illegal substances.

## 2021-08-09 NOTE — ED PROVIDER NOTES
"Subjective   This patient presents to the emergency department complaining of chest pain for the last 3 days.  The patient states that the chest pain is sharp and left-sided.  She does have a cough with sputum production.  She has had no fever or chills.  She has had no vomiting or diarrhea.  The patient states the pain is worse with movement and better at rest and she has had pain like this before.  After discussing the patient's lab results and EKG and chest x-ray with her,  she states that she is having \"psychiatric issues\" and she states that she feels suicidal at times.          Review of Systems   Constitutional: Negative.    HENT: Negative.    Eyes: Negative.    Respiratory: Positive for cough. Negative for chest tightness, shortness of breath, wheezing and stridor.    Cardiovascular: Positive for chest pain.        Sharp and left-sided   Gastrointestinal: Negative.    Endocrine: Negative.    Genitourinary: Negative.    Musculoskeletal: Negative.    Skin: Negative.    Allergic/Immunologic: Negative.    Neurological: Negative.    Hematological: Negative.    Psychiatric/Behavioral: Negative.        Past Medical History:   Diagnosis Date   • Borderline personality disorder (CMS/HCC)    • Diabetes mellitus (CMS/HCC)    • Endocarditis    • Endocarditis    • Hepatitis C    • Mood disorder (CMS/HCC)    • PTSD (post-traumatic stress disorder)    • Seizures (CMS/[facility])        Allergies   Allergen Reactions   • Morphine Anaphylaxis   • Peanut Oil Anaphylaxis   • Sulfa Antibiotics Anaphylaxis   • Topiramate Anaphylaxis   • Iodine Other (See Comments)   • Latex Hives   • Shellfish-Derived Products Rash       Past Surgical History:   Procedure Laterality Date   • BACK SURGERY     •  SECTION         History reviewed. No pertinent family history.    Social History     Socioeconomic History   • Marital status: Single     Spouse name: Not on file   • Number of children: Not on file   • Years of education: Not on file "   • Highest education level: Not on file   Tobacco Use   • Smoking status: Never Smoker   • Smokeless tobacco: Never Used   Substance and Sexual Activity   • Alcohol use: Never   • Drug use: Yes     Types: IV, Methamphetamines, Marijuana   • Sexual activity: Defer           Objective   Physical Exam  Constitutional:       Appearance: She is obese.   HENT:      Head: Normocephalic and atraumatic.   Eyes:      Extraocular Movements: Extraocular movements intact.      Pupils: Pupils are equal, round, and reactive to light.   Cardiovascular:      Rate and Rhythm: Normal rate and regular rhythm.      Heart sounds: Normal heart sounds.   Pulmonary:      Effort: Pulmonary effort is normal.      Breath sounds: Normal breath sounds.   Chest:      Chest wall: Tenderness present. No mass, deformity, crepitus or edema. There is no dullness to percussion.      Comments: The patient has left-sided chest wall tenderness that reproduces her symptoms.  Abdominal:      General: Bowel sounds are normal.      Palpations: Abdomen is soft.   Musculoskeletal:         General: Normal range of motion.      Cervical back: Normal range of motion and neck supple.   Skin:     General: Skin is warm.      Capillary Refill: Capillary refill takes less than 2 seconds.   Neurological:      General: No focal deficit present.      Mental Status: She is alert and oriented to person, place, and time.   Psychiatric:         Mood and Affect: Mood normal.         Behavior: Behavior normal.         Procedures           ED Course    EKG:  Normal sinus rhythm with rate of 90  Normal P waves normal NV interval normal QRS and normal axis normal ST segments and normal QT QTC.              Results for orders placed or performed during the hospital encounter of 08/09/21   COVID-19,CEPHEID/ANTOLIN/BDMAX,COR/CHILO/PAD/MOSES IN-HOUSE(OR EMERGENT/ADD-ON),NP SWAB IN TRANSPORT MEDIA 3-4 HR TAT, RT-PCR - Swab, Nasopharynx    Specimen: Nasopharynx; Swab   Result Value Ref Range     COVID19 Not Detected Not Detected - Ref. Range   Comprehensive Metabolic Panel    Specimen: Blood   Result Value Ref Range    Glucose 195 (H) 65 - 99 mg/dL    BUN 9 6 - 20 mg/dL    Creatinine 0.61 0.57 - 1.00 mg/dL    Sodium 134 (L) 136 - 145 mmol/L    Potassium 4.0 3.5 - 5.2 mmol/L    Chloride 99 98 - 107 mmol/L    CO2 22.8 22.0 - 29.0 mmol/L    Calcium 9.5 8.6 - 10.5 mg/dL    Total Protein 7.8 6.0 - 8.5 g/dL    Albumin 3.80 3.50 - 5.20 g/dL    ALT (SGPT) 63 (H) 1 - 33 U/L    AST (SGOT) 34 (H) 1 - 32 U/L    Alkaline Phosphatase 110 39 - 117 U/L    Total Bilirubin 0.3 0.0 - 1.2 mg/dL    eGFR Non African Amer 118 >60 mL/min/1.73    Globulin 4.0 gm/dL    A/G Ratio 1.0 g/dL    BUN/Creatinine Ratio 14.8 7.0 - 25.0    Anion Gap 12.2 5.0 - 15.0 mmol/L   Lipase    Specimen: Blood   Result Value Ref Range    Lipase 20 13 - 60 U/L   BNP    Specimen: Blood   Result Value Ref Range    proBNP 31.3 0.0 - 450.0 pg/mL   Magnesium    Specimen: Blood   Result Value Ref Range    Magnesium 1.6 1.6 - 2.6 mg/dL   CK Total & CKMB    Specimen: Blood   Result Value Ref Range    CKMB 1.02 <=5.30 ng/mL    Creatine Kinase 25 20 - 180 U/L   CBC Auto Differential    Specimen: Blood   Result Value Ref Range    WBC 6.01 3.40 - 10.80 10*3/mm3    RBC 4.95 3.77 - 5.28 10*6/mm3    Hemoglobin 11.2 (L) 12.0 - 15.9 g/dL    Hematocrit 36.9 34.0 - 46.6 %    MCV 74.5 (L) 79.0 - 97.0 fL    MCH 22.6 (L) 26.6 - 33.0 pg    MCHC 30.4 (L) 31.5 - 35.7 g/dL    RDW 14.7 12.3 - 15.4 %    RDW-SD 38.5 37.0 - 54.0 fl    MPV 9.0 6.0 - 12.0 fL    Platelets 234 140 - 450 10*3/mm3    nRBC 0.0 0.0 - 0.2 /100 WBC   Ethanol    Specimen: Blood   Result Value Ref Range    Ethanol <10 0 - 10 mg/dL    Ethanol % <0.010 %   Urine Drug Screen - Urine, Clean Catch    Specimen: Urine, Clean Catch   Result Value Ref Range    Amphet/Methamphet, Screen Positive (A) Negative    Barbiturates Screen, Urine Negative Negative    Benzodiazepine Screen, Urine Negative Negative    Cocaine  Screen, Urine Negative Negative    Opiate Screen Negative Negative    THC, Screen, Urine Positive (A) Negative    Methadone Screen, Urine Negative Negative    Oxycodone Screen, Urine Negative Negative   Acetaminophen Level    Specimen: Blood   Result Value Ref Range    Acetaminophen <5.0 0.0 - 30.0 mcg/mL   Salicylate Level    Specimen: Blood   Result Value Ref Range    Salicylate <0.3 <=30.0 mg/dL   Manual Differential    Specimen: Blood   Result Value Ref Range    Neutrophil % 53.0 42.7 - 76.0 %    Lymphocyte % 36.0 19.6 - 45.3 %    Monocyte % 8.0 5.0 - 12.0 %    Eosinophil % 2.0 0.3 - 6.2 %    Atypical Lymphocyte % 1.0 0.0 - 5.0 %    Neutrophils Absolute 3.19 1.70 - 7.00 10*3/mm3    Lymphocytes Absolute 2.22 0.70 - 3.10 10*3/mm3    Monocytes Absolute 0.48 0.10 - 0.90 10*3/mm3    Eosinophils Absolute 0.12 0.00 - 0.40 10*3/mm3    Anisocytosis Slight/1+ None Seen    Hypochromia Slight/1+ None Seen    Microcytes Slight/1+ None Seen    WBC Morphology Normal Normal    Platelet Estimate Adequate Normal    Large Platelets Slight/1+ None Seen   POC Troponin I    Specimen: Blood   Result Value Ref Range    Troponin I 0.00 0.00 - 0.60 ng/mL   POC Troponin I    Specimen: Blood   Result Value Ref Range    Troponin I 0.00 0.00 - 0.60 ng/mL   ECG 12 Lead   Result Value Ref Range    QT Interval 361 ms   ECG 12 Lead   Result Value Ref Range    QT Interval 352 ms   Green Top (Gel)   Result Value Ref Range    Extra Tube Hold for add-ons.    Lavender Top   Result Value Ref Range    Extra Tube hold for add-on    Gold Top - SST   Result Value Ref Range    Extra Tube Hold for add-ons.                                               MDM  Number of Diagnoses or Management Options  Left-sided chest wall pain  Substance abuse (CMS/HCC)  Diagnosis management comments: On repeat examination the patient is neither suicidal nor homicidal she was evaluated by the  in the emergency department and the patient will be discharged  home.       Amount and/or Complexity of Data Reviewed  Clinical lab tests: reviewed  Tests in the radiology section of CPT®: reviewed  Tests in the medicine section of CPT®: reviewed    Patient Progress  Patient progress: improved      Final diagnoses:   Left-sided chest wall pain   Substance abuse (CMS/HCC)       ED Disposition  ED Disposition     ED Disposition Condition Comment    Discharge Stable           Provider, No Known  [place] [id number]    In 1 day  Call for appointment         Medication List      No changes were made to your prescriptions during this visit.          Demetrio Haile, DO  08/10/21 1133

## 2021-08-10 ENCOUNTER — HOSPITAL ENCOUNTER (INPATIENT)
Facility: HOSPITAL | Age: 26
LOS: 6 days | Discharge: HOME | End: 2021-08-16
Attending: PSYCHIATRY & NEUROLOGY | Admitting: PSYCHIATRY & NEUROLOGY

## 2021-08-10 ENCOUNTER — HOSPITAL ENCOUNTER (EMERGENCY)
Facility: HOSPITAL | Age: 26
End: 2021-08-10
Attending: EMERGENCY MEDICINE | Admitting: PSYCHIATRY & NEUROLOGY

## 2021-08-10 VITALS
HEIGHT: 63 IN | BODY MASS INDEX: 28.32 KG/M2 | OXYGEN SATURATION: 98 % | HEART RATE: 106 BPM | TEMPERATURE: 97.7 F | SYSTOLIC BLOOD PRESSURE: 103 MMHG | DIASTOLIC BLOOD PRESSURE: 61 MMHG | WEIGHT: 159.83 LBS | RESPIRATION RATE: 16 BRPM

## 2021-08-10 DIAGNOSIS — R44.2 TACTILE HALLUCINATIONS: ICD-10-CM

## 2021-08-10 DIAGNOSIS — R45.1 AGITATION: ICD-10-CM

## 2021-08-10 DIAGNOSIS — O23.40: ICD-10-CM

## 2021-08-10 DIAGNOSIS — R45.851 SUICIDAL IDEATION: Primary | ICD-10-CM

## 2021-08-10 PROBLEM — F33.2 SEVERE RECURRENT MAJOR DEPRESSION WITHOUT PSYCHOTIC FEATURES: Status: ACTIVE | Noted: 2021-08-10

## 2021-08-10 LAB
ALBUMIN SERPL-MCNC: 3.4 G/DL (ref 3.5–5.2)
ALBUMIN/GLOB SERPL: 0.9 G/DL
ALP SERPL-CCNC: 109 U/L (ref 39–117)
ALT SERPL W P-5'-P-CCNC: 55 U/L (ref 1–33)
AMPHET+METHAMPHET UR QL: NEGATIVE
ANION GAP SERPL CALCULATED.3IONS-SCNC: 9.5 MMOL/L (ref 5–15)
APAP SERPL-MCNC: <5 MCG/ML (ref 0–30)
AST SERPL-CCNC: 44 U/L (ref 1–32)
BACTERIA UR QL AUTO: ABNORMAL /HPF
BARBITURATES UR QL SCN: NEGATIVE
BASOPHILS # BLD AUTO: 0.01 10*3/MM3 (ref 0–0.2)
BASOPHILS NFR BLD AUTO: 0.2 % (ref 0–1.5)
BENZODIAZ UR QL SCN: NEGATIVE
BILIRUB SERPL-MCNC: 0.3 MG/DL (ref 0–1.2)
BILIRUB UR QL STRIP: NEGATIVE
BUN SERPL-MCNC: 11 MG/DL (ref 6–20)
BUN/CREAT SERPL: 15.5 (ref 7–25)
CALCIUM SPEC-SCNC: 8.8 MG/DL (ref 8.6–10.5)
CANNABINOIDS SERPL QL: NEGATIVE
CHLORIDE SERPL-SCNC: 97 MMOL/L (ref 98–107)
CLARITY UR: CLEAR
CO2 SERPL-SCNC: 24.5 MMOL/L (ref 22–29)
COCAINE UR QL: NEGATIVE
COLOR UR: YELLOW
CREAT SERPL-MCNC: 0.71 MG/DL (ref 0.57–1)
DEPRECATED RDW RBC AUTO: 39.5 FL (ref 37–54)
EOSINOPHIL # BLD AUTO: 0.15 10*3/MM3 (ref 0–0.4)
EOSINOPHIL NFR BLD AUTO: 3.3 % (ref 0.3–6.2)
ERYTHROCYTE [DISTWIDTH] IN BLOOD BY AUTOMATED COUNT: 14.7 % (ref 12.3–15.4)
ETHANOL BLD-MCNC: <10 MG/DL (ref 0–10)
ETHANOL UR QL: <0.01 %
GFR SERPL CREATININE-BSD FRML MDRD: 99 ML/MIN/1.73
GLOBULIN UR ELPH-MCNC: 3.6 GM/DL
GLUCOSE SERPL-MCNC: 208 MG/DL (ref 65–99)
GLUCOSE UR STRIP-MCNC: ABNORMAL MG/DL
HCG SERPL QL: NEGATIVE
HCT VFR BLD AUTO: 34.9 % (ref 34–46.6)
HGB BLD-MCNC: 10.7 G/DL (ref 12–15.9)
HGB UR QL STRIP.AUTO: NEGATIVE
HOLD SPECIMEN: NORMAL
HYALINE CASTS UR QL AUTO: ABNORMAL /LPF
IMM GRANULOCYTES # BLD AUTO: 0.01 10*3/MM3 (ref 0–0.05)
IMM GRANULOCYTES NFR BLD AUTO: 0.2 % (ref 0–0.5)
KETONES UR QL STRIP: NEGATIVE
LEUKOCYTE ESTERASE UR QL STRIP.AUTO: ABNORMAL
LYMPHOCYTES # BLD AUTO: 1.93 10*3/MM3 (ref 0.7–3.1)
LYMPHOCYTES NFR BLD AUTO: 41.9 % (ref 19.6–45.3)
MCH RBC QN AUTO: 22.8 PG (ref 26.6–33)
MCHC RBC AUTO-ENTMCNC: 30.7 G/DL (ref 31.5–35.7)
MCV RBC AUTO: 74.3 FL (ref 79–97)
METHADONE UR QL SCN: NEGATIVE
MONOCYTES # BLD AUTO: 0.45 10*3/MM3 (ref 0.1–0.9)
MONOCYTES NFR BLD AUTO: 9.8 % (ref 5–12)
NEUTROPHILS NFR BLD AUTO: 2.06 10*3/MM3 (ref 1.7–7)
NEUTROPHILS NFR BLD AUTO: 44.6 % (ref 42.7–76)
NITRITE UR QL STRIP: POSITIVE
NRBC BLD AUTO-RTO: 0 /100 WBC (ref 0–0.2)
OPIATES UR QL: NEGATIVE
OXYCODONE UR QL SCN: NEGATIVE
PH UR STRIP.AUTO: 5.5 [PH] (ref 5–8)
PLATELET # BLD AUTO: 215 10*3/MM3 (ref 140–450)
PMV BLD AUTO: 9.5 FL (ref 6–12)
POTASSIUM SERPL-SCNC: 3.8 MMOL/L (ref 3.5–5.2)
PROT SERPL-MCNC: 7 G/DL (ref 6–8.5)
PROT UR QL STRIP: NEGATIVE
QT INTERVAL: 352 MS
QT INTERVAL: 361 MS
RBC # BLD AUTO: 4.7 10*6/MM3 (ref 3.77–5.28)
RBC # UR: ABNORMAL /HPF
REF LAB TEST METHOD: ABNORMAL
SALICYLATES SERPL-MCNC: <0.3 MG/DL
SODIUM SERPL-SCNC: 131 MMOL/L (ref 136–145)
SP GR UR STRIP: 1.01 (ref 1–1.03)
SQUAMOUS #/AREA URNS HPF: ABNORMAL /HPF
UROBILINOGEN UR QL STRIP: ABNORMAL
WBC # BLD AUTO: 4.61 10*3/MM3 (ref 3.4–10.8)
WBC UR QL AUTO: ABNORMAL /HPF
WHOLE BLOOD HOLD SPECIMEN: NORMAL

## 2021-08-10 PROCEDURE — 99284 EMERGENCY DEPT VISIT MOD MDM: CPT

## 2021-08-10 PROCEDURE — 80307 DRUG TEST PRSMV CHEM ANLYZR: CPT | Performed by: EMERGENCY MEDICINE

## 2021-08-10 PROCEDURE — 82077 ASSAY SPEC XCP UR&BREATH IA: CPT | Performed by: EMERGENCY MEDICINE

## 2021-08-10 PROCEDURE — 84703 CHORIONIC GONADOTROPIN ASSAY: CPT | Performed by: EMERGENCY MEDICINE

## 2021-08-10 PROCEDURE — 80053 COMPREHEN METABOLIC PANEL: CPT | Performed by: EMERGENCY MEDICINE

## 2021-08-10 PROCEDURE — 85025 COMPLETE CBC W/AUTO DIFF WBC: CPT | Performed by: EMERGENCY MEDICINE

## 2021-08-10 PROCEDURE — 80179 DRUG ASSAY SALICYLATE: CPT | Performed by: EMERGENCY MEDICINE

## 2021-08-10 PROCEDURE — 81001 URINALYSIS AUTO W/SCOPE: CPT | Performed by: NURSE PRACTITIONER

## 2021-08-10 PROCEDURE — 87077 CULTURE AEROBIC IDENTIFY: CPT | Performed by: NURSE PRACTITIONER

## 2021-08-10 PROCEDURE — 80143 DRUG ASSAY ACETAMINOPHEN: CPT | Performed by: EMERGENCY MEDICINE

## 2021-08-10 PROCEDURE — 87186 SC STD MICRODIL/AGAR DIL: CPT | Performed by: NURSE PRACTITIONER

## 2021-08-10 PROCEDURE — 87086 URINE CULTURE/COLONY COUNT: CPT | Performed by: NURSE PRACTITIONER

## 2021-08-10 PROCEDURE — 87181 SC STD AGAR DILUTION PER AGT: CPT | Performed by: NURSE PRACTITIONER

## 2021-08-10 RX ORDER — NITROFURANTOIN 25; 75 MG/1; MG/1
100 CAPSULE ORAL EVERY 12 HOURS SCHEDULED
Status: DISCONTINUED | OUTPATIENT
Start: 2021-08-10 | End: 2021-08-10 | Stop reason: SDUPTHER

## 2021-08-10 RX ORDER — ALUMINA, MAGNESIA, AND SIMETHICONE 2400; 2400; 240 MG/30ML; MG/30ML; MG/30ML
15 SUSPENSION ORAL EVERY 6 HOURS PRN
Status: DISCONTINUED | OUTPATIENT
Start: 2021-08-10 | End: 2021-08-16 | Stop reason: HOSPADM

## 2021-08-10 RX ORDER — ACETAMINOPHEN 325 MG/1
650 TABLET ORAL EVERY 4 HOURS PRN
Status: DISCONTINUED | OUTPATIENT
Start: 2021-08-10 | End: 2021-08-16 | Stop reason: HOSPADM

## 2021-08-10 RX ORDER — ESCITALOPRAM OXALATE 10 MG/1
10 TABLET ORAL DAILY
Status: DISCONTINUED | OUTPATIENT
Start: 2021-08-10 | End: 2021-08-16 | Stop reason: HOSPADM

## 2021-08-10 RX ORDER — LOPERAMIDE HYDROCHLORIDE 2 MG/1
2 CAPSULE ORAL
Status: DISCONTINUED | OUTPATIENT
Start: 2021-08-10 | End: 2021-08-16 | Stop reason: HOSPADM

## 2021-08-10 RX ORDER — NITROFURANTOIN 25; 75 MG/1; MG/1
100 CAPSULE ORAL 2 TIMES DAILY
Status: DISCONTINUED | OUTPATIENT
Start: 2021-08-10 | End: 2021-08-13

## 2021-08-10 RX ORDER — DIPHENHYDRAMINE HYDROCHLORIDE 50 MG/ML
50 INJECTION INTRAMUSCULAR; INTRAVENOUS EVERY 4 HOURS PRN
Status: DISCONTINUED | OUTPATIENT
Start: 2021-08-10 | End: 2021-08-16 | Stop reason: HOSPADM

## 2021-08-10 RX ORDER — LORAZEPAM 2 MG/1
2 TABLET ORAL EVERY 4 HOURS PRN
Status: DISCONTINUED | OUTPATIENT
Start: 2021-08-10 | End: 2021-08-16 | Stop reason: HOSPADM

## 2021-08-10 RX ORDER — HYDROXYZINE PAMOATE 50 MG/1
50 CAPSULE ORAL EVERY 6 HOURS PRN
Status: DISCONTINUED | OUTPATIENT
Start: 2021-08-10 | End: 2021-08-16 | Stop reason: HOSPADM

## 2021-08-10 RX ORDER — NITROFURANTOIN 25; 75 MG/1; MG/1
100 CAPSULE ORAL 2 TIMES DAILY
Qty: 20 CAPSULE | Refills: 0 | Status: SHIPPED | OUTPATIENT
Start: 2021-08-10 | End: 2021-08-16 | Stop reason: HOSPADM

## 2021-08-10 RX ORDER — HALOPERIDOL 5 MG/1
5 TABLET ORAL EVERY 4 HOURS PRN
Status: DISCONTINUED | OUTPATIENT
Start: 2021-08-10 | End: 2021-08-16 | Stop reason: HOSPADM

## 2021-08-10 RX ORDER — DIPHENHYDRAMINE HCL 50 MG
50 CAPSULE ORAL EVERY 4 HOURS PRN
Status: DISCONTINUED | OUTPATIENT
Start: 2021-08-10 | End: 2021-08-16 | Stop reason: HOSPADM

## 2021-08-10 RX ORDER — HALOPERIDOL 5 MG/ML
5 INJECTION INTRAMUSCULAR EVERY 4 HOURS PRN
Status: DISCONTINUED | OUTPATIENT
Start: 2021-08-10 | End: 2021-08-16 | Stop reason: HOSPADM

## 2021-08-10 RX ORDER — TRAZODONE HYDROCHLORIDE 50 MG/1
100 TABLET ORAL NIGHTLY PRN
Status: DISCONTINUED | OUTPATIENT
Start: 2021-08-10 | End: 2021-08-16 | Stop reason: HOSPADM

## 2021-08-10 RX ORDER — PANTOPRAZOLE SODIUM 40 MG/1
40 TABLET, DELAYED RELEASE ORAL EVERY MORNING
Refills: 0 | Status: DISCONTINUED | OUTPATIENT
Start: 2021-08-11 | End: 2021-08-16 | Stop reason: HOSPADM

## 2021-08-10 RX ORDER — SODIUM CHLORIDE 0.9 % (FLUSH) 0.9 %
10 SYRINGE (ML) INJECTION AS NEEDED
Status: DISCONTINUED | OUTPATIENT
Start: 2021-08-10 | End: 2021-08-10 | Stop reason: HOSPADM

## 2021-08-10 RX ORDER — LORAZEPAM 2 MG/ML
2 INJECTION INTRAMUSCULAR EVERY 4 HOURS PRN
Status: DISCONTINUED | OUTPATIENT
Start: 2021-08-10 | End: 2021-08-16 | Stop reason: HOSPADM

## 2021-08-10 RX ORDER — LEVETIRACETAM 500 MG/1
500 TABLET ORAL EVERY 12 HOURS SCHEDULED
Status: DISCONTINUED | OUTPATIENT
Start: 2021-08-10 | End: 2021-08-16 | Stop reason: HOSPADM

## 2021-08-10 RX ADMIN — LEVETIRACETAM 500 MG: 500 TABLET ORAL at 16:22

## 2021-08-10 RX ADMIN — NITROFURANTOIN MONOHYDRATE/MACROCRYSTALLINE 100 MG: 25; 75 CAPSULE ORAL at 22:03

## 2021-08-10 RX ADMIN — ESCITALOPRAM 10 MG: 10 TABLET, FILM COATED ORAL at 22:01

## 2021-08-10 RX ADMIN — NITROFURANTOIN MONOHYDRATE/MACROCRYSTALLINE 100 MG: 25; 75 CAPSULE ORAL at 12:45

## 2021-08-10 RX ADMIN — LEVETIRACETAM 500 MG: 500 TABLET ORAL at 22:02

## 2021-08-10 RX ADMIN — HYDROXYZINE PAMOATE 50 MG: 50 CAPSULE ORAL at 17:20

## 2021-08-11 RX ADMIN — NITROFURANTOIN MONOHYDRATE/MACROCRYSTALLINE 100 MG: 25; 75 CAPSULE ORAL at 21:04

## 2021-08-11 RX ADMIN — ALUMINUM HYDROXIDE, MAGNESIUM HYDROXIDE, AND DIMETHICONE 15 ML: 400; 400; 40 SUSPENSION ORAL at 17:23

## 2021-08-11 RX ADMIN — LEVETIRACETAM 500 MG: 500 TABLET ORAL at 09:33

## 2021-08-11 RX ADMIN — ACETAMINOPHEN 650 MG: 325 TABLET ORAL at 17:23

## 2021-08-11 RX ADMIN — NITROFURANTOIN MONOHYDRATE/MACROCRYSTALLINE 100 MG: 25; 75 CAPSULE ORAL at 09:34

## 2021-08-11 RX ADMIN — ACETAMINOPHEN 650 MG: 325 TABLET ORAL at 00:05

## 2021-08-11 RX ADMIN — ESCITALOPRAM 10 MG: 10 TABLET, FILM COATED ORAL at 09:33

## 2021-08-11 RX ADMIN — TRAZODONE HYDROCHLORIDE 100 MG: 50 TABLET ORAL at 21:00

## 2021-08-11 RX ADMIN — PANTOPRAZOLE SODIUM 40 MG: 40 TABLET, DELAYED RELEASE ORAL at 06:22

## 2021-08-11 RX ADMIN — LEVETIRACETAM 500 MG: 500 TABLET ORAL at 21:03

## 2021-08-12 PROBLEM — F12.10 MARIJUANA ABUSE: Status: ACTIVE | Noted: 2021-08-12

## 2021-08-12 PROBLEM — N30.00 ACUTE CYSTITIS: Status: ACTIVE | Noted: 2021-08-12

## 2021-08-12 PROBLEM — F15.10 AMPHETAMINE ABUSE: Status: ACTIVE | Noted: 2021-08-12

## 2021-08-12 LAB — GLUCOSE BLDC GLUCOMTR-MCNC: 129 MG/DL (ref 70–99)

## 2021-08-12 PROCEDURE — 82962 GLUCOSE BLOOD TEST: CPT

## 2021-08-12 RX ORDER — GRANULES FOR ORAL 3 G/1
POWDER ORAL ONCE
Status: CANCELLED | OUTPATIENT
Start: 2021-08-12 | End: 2021-08-12

## 2021-08-12 RX ADMIN — LEVETIRACETAM 500 MG: 500 TABLET ORAL at 08:20

## 2021-08-12 RX ADMIN — ESCITALOPRAM 10 MG: 10 TABLET, FILM COATED ORAL at 08:20

## 2021-08-12 RX ADMIN — PANTOPRAZOLE SODIUM 40 MG: 40 TABLET, DELAYED RELEASE ORAL at 08:20

## 2021-08-12 RX ADMIN — TRAZODONE HYDROCHLORIDE 100 MG: 50 TABLET ORAL at 21:10

## 2021-08-12 RX ADMIN — ACETAMINOPHEN 650 MG: 325 TABLET ORAL at 08:20

## 2021-08-12 RX ADMIN — NITROFURANTOIN MONOHYDRATE/MACROCRYSTALLINE 100 MG: 25; 75 CAPSULE ORAL at 08:20

## 2021-08-12 RX ADMIN — LEVETIRACETAM 500 MG: 500 TABLET ORAL at 21:10

## 2021-08-12 RX ADMIN — ACETAMINOPHEN 650 MG: 325 TABLET ORAL at 21:16

## 2021-08-12 RX ADMIN — MAGNESIUM HYDROXIDE 10 ML: 2400 SUSPENSION ORAL at 16:46

## 2021-08-12 RX ADMIN — NITROFURANTOIN MONOHYDRATE/MACROCRYSTALLINE 100 MG: 25; 75 CAPSULE ORAL at 21:10

## 2021-08-13 LAB
ALBUMIN SERPL-MCNC: 4 G/DL (ref 3.5–5.2)
ALBUMIN/GLOB SERPL: 1.1 G/DL
ALP SERPL-CCNC: 113 U/L (ref 39–117)
ALT SERPL W P-5'-P-CCNC: 65 U/L (ref 1–33)
ANION GAP SERPL CALCULATED.3IONS-SCNC: 7.7 MMOL/L (ref 5–15)
AST SERPL-CCNC: 41 U/L (ref 1–32)
BACTERIA SPEC AEROBE CULT: ABNORMAL
BILIRUB SERPL-MCNC: <0.2 MG/DL (ref 0–1.2)
BUN SERPL-MCNC: 6 MG/DL (ref 6–20)
BUN/CREAT SERPL: 8.3 (ref 7–25)
CALCIUM SPEC-SCNC: 9.1 MG/DL (ref 8.6–10.5)
CHLORIDE SERPL-SCNC: 99 MMOL/L (ref 98–107)
CO2 SERPL-SCNC: 29.3 MMOL/L (ref 22–29)
CREAT SERPL-MCNC: 0.72 MG/DL (ref 0.57–1)
GFR SERPL CREATININE-BSD FRML MDRD: 97 ML/MIN/1.73
GLOBULIN UR ELPH-MCNC: 3.8 GM/DL
GLUCOSE SERPL-MCNC: 212 MG/DL (ref 65–99)
POTASSIUM SERPL-SCNC: 4.4 MMOL/L (ref 3.5–5.2)
PROT SERPL-MCNC: 7.8 G/DL (ref 6–8.5)
SODIUM SERPL-SCNC: 136 MMOL/L (ref 136–145)

## 2021-08-13 PROCEDURE — 80053 COMPREHEN METABOLIC PANEL: CPT | Performed by: PSYCHIATRY & NEUROLOGY

## 2021-08-13 RX ORDER — GRANULES FOR ORAL 3 G/1
3 POWDER ORAL ONCE
Status: COMPLETED | OUTPATIENT
Start: 2021-08-13 | End: 2021-08-13

## 2021-08-13 RX ADMIN — ESCITALOPRAM 10 MG: 10 TABLET, FILM COATED ORAL at 08:11

## 2021-08-13 RX ADMIN — LEVETIRACETAM 500 MG: 500 TABLET ORAL at 08:11

## 2021-08-13 RX ADMIN — PANTOPRAZOLE SODIUM 40 MG: 40 TABLET, DELAYED RELEASE ORAL at 07:05

## 2021-08-13 RX ADMIN — ALUMINUM HYDROXIDE, MAGNESIUM HYDROXIDE, AND DIMETHICONE 15 ML: 400; 400; 40 SUSPENSION ORAL at 18:18

## 2021-08-13 RX ADMIN — ALUMINUM HYDROXIDE, MAGNESIUM HYDROXIDE, AND DIMETHICONE 15 ML: 400; 400; 40 SUSPENSION ORAL at 08:10

## 2021-08-13 RX ADMIN — TRAZODONE HYDROCHLORIDE 100 MG: 50 TABLET ORAL at 20:54

## 2021-08-13 RX ADMIN — HYDROXYZINE PAMOATE 50 MG: 50 CAPSULE ORAL at 08:55

## 2021-08-13 RX ADMIN — MAGNESIUM HYDROXIDE 10 ML: 2400 SUSPENSION ORAL at 21:35

## 2021-08-13 RX ADMIN — NITROFURANTOIN MONOHYDRATE/MACROCRYSTALLINE 100 MG: 25; 75 CAPSULE ORAL at 08:11

## 2021-08-13 RX ADMIN — LEVETIRACETAM 500 MG: 500 TABLET ORAL at 20:54

## 2021-08-13 RX ADMIN — FOSFOMYCIN TROMETHAMINE 3 G: 3 POWDER ORAL at 14:02

## 2021-08-13 RX ADMIN — HYDROXYZINE PAMOATE 50 MG: 50 CAPSULE ORAL at 18:55

## 2021-08-14 RX ADMIN — PANTOPRAZOLE SODIUM 40 MG: 40 TABLET, DELAYED RELEASE ORAL at 07:05

## 2021-08-14 RX ADMIN — ACETAMINOPHEN 650 MG: 325 TABLET ORAL at 12:05

## 2021-08-14 RX ADMIN — ALUMINUM HYDROXIDE, MAGNESIUM HYDROXIDE, AND DIMETHICONE 15 ML: 400; 400; 40 SUSPENSION ORAL at 18:00

## 2021-08-14 RX ADMIN — TRAZODONE HYDROCHLORIDE 100 MG: 50 TABLET ORAL at 21:00

## 2021-08-14 RX ADMIN — ACETAMINOPHEN 650 MG: 325 TABLET ORAL at 23:40

## 2021-08-14 RX ADMIN — LOPERAMIDE HYDROCHLORIDE 2 MG: 2 CAPSULE ORAL at 23:41

## 2021-08-14 RX ADMIN — LOPERAMIDE HYDROCHLORIDE 2 MG: 2 CAPSULE ORAL at 19:58

## 2021-08-14 RX ADMIN — ESCITALOPRAM 10 MG: 10 TABLET, FILM COATED ORAL at 11:43

## 2021-08-14 RX ADMIN — ALUMINUM HYDROXIDE, MAGNESIUM HYDROXIDE, AND DIMETHICONE 15 ML: 400; 400; 40 SUSPENSION ORAL at 12:02

## 2021-08-14 RX ADMIN — LEVETIRACETAM 500 MG: 500 TABLET ORAL at 11:44

## 2021-08-15 RX ADMIN — HYDROXYZINE PAMOATE 50 MG: 50 CAPSULE ORAL at 23:55

## 2021-08-15 RX ADMIN — LOPERAMIDE HYDROCHLORIDE 2 MG: 2 CAPSULE ORAL at 14:51

## 2021-08-15 RX ADMIN — PANTOPRAZOLE SODIUM 40 MG: 40 TABLET, DELAYED RELEASE ORAL at 08:29

## 2021-08-15 RX ADMIN — MAGNESIUM HYDROXIDE 10 ML: 2400 SUSPENSION ORAL at 23:46

## 2021-08-15 RX ADMIN — TRAZODONE HYDROCHLORIDE 100 MG: 50 TABLET ORAL at 20:53

## 2021-08-15 RX ADMIN — ESCITALOPRAM 10 MG: 10 TABLET, FILM COATED ORAL at 08:29

## 2021-08-15 RX ADMIN — LEVETIRACETAM 500 MG: 500 TABLET ORAL at 00:36

## 2021-08-15 RX ADMIN — ACETAMINOPHEN 650 MG: 325 TABLET ORAL at 15:50

## 2021-08-15 RX ADMIN — LEVETIRACETAM 500 MG: 500 TABLET ORAL at 20:53

## 2021-08-15 RX ADMIN — ALUMINUM HYDROXIDE, MAGNESIUM HYDROXIDE, AND DIMETHICONE 15 ML: 400; 400; 40 SUSPENSION ORAL at 13:01

## 2021-08-15 RX ADMIN — LEVETIRACETAM 500 MG: 500 TABLET ORAL at 08:29

## 2021-08-16 ENCOUNTER — HOSPITAL ENCOUNTER (EMERGENCY)
Facility: HOSPITAL | Age: 26
Discharge: LEFT BEFORE DISCHARGE | End: 2021-08-17

## 2021-08-16 VITALS
HEIGHT: 63 IN | SYSTOLIC BLOOD PRESSURE: 130 MMHG | WEIGHT: 159.83 LBS | RESPIRATION RATE: 16 BRPM | DIASTOLIC BLOOD PRESSURE: 87 MMHG | OXYGEN SATURATION: 100 % | HEART RATE: 81 BPM | BODY MASS INDEX: 28.32 KG/M2 | TEMPERATURE: 98.4 F

## 2021-08-16 VITALS
OXYGEN SATURATION: 99 % | TEMPERATURE: 98.5 F | SYSTOLIC BLOOD PRESSURE: 134 MMHG | DIASTOLIC BLOOD PRESSURE: 82 MMHG | HEART RATE: 83 BPM | BODY MASS INDEX: 29.53 KG/M2 | WEIGHT: 166.67 LBS | HEIGHT: 63 IN | RESPIRATION RATE: 20 BRPM

## 2021-08-16 PROBLEM — A60.04 HERPES SIMPLEX VULVOVAGINITIS: Status: ACTIVE | Noted: 2021-08-16

## 2021-08-16 PROBLEM — K21.9 GASTROESOPHAGEAL REFLUX DISEASE WITHOUT ESOPHAGITIS: Status: ACTIVE | Noted: 2021-08-16

## 2021-08-16 LAB
ALBUMIN SERPL-MCNC: 4.2 G/DL (ref 3.5–5.2)
ALBUMIN/GLOB SERPL: 1.1 G/DL
ALP SERPL-CCNC: 103 U/L (ref 39–117)
ALT SERPL W P-5'-P-CCNC: 37 U/L (ref 1–33)
ANION GAP SERPL CALCULATED.3IONS-SCNC: 11.6 MMOL/L (ref 5–15)
AST SERPL-CCNC: 21 U/L (ref 1–32)
BASOPHILS # BLD AUTO: 0.01 10*3/MM3 (ref 0–0.2)
BASOPHILS NFR BLD AUTO: 0.2 % (ref 0–1.5)
BILIRUB SERPL-MCNC: 0.3 MG/DL (ref 0–1.2)
BUN SERPL-MCNC: 8 MG/DL (ref 6–20)
BUN/CREAT SERPL: 10.8 (ref 7–25)
CALCIUM SPEC-SCNC: 9.6 MG/DL (ref 8.6–10.5)
CHLORIDE SERPL-SCNC: 99 MMOL/L (ref 98–107)
CO2 SERPL-SCNC: 27.4 MMOL/L (ref 22–29)
CREAT SERPL-MCNC: 0.74 MG/DL (ref 0.57–1)
DEPRECATED RDW RBC AUTO: 37.2 FL (ref 37–54)
EOSINOPHIL # BLD AUTO: 0.07 10*3/MM3 (ref 0–0.4)
EOSINOPHIL NFR BLD AUTO: 1.1 % (ref 0.3–6.2)
ERYTHROCYTE [DISTWIDTH] IN BLOOD BY AUTOMATED COUNT: 14.3 % (ref 12.3–15.4)
GFR SERPL CREATININE-BSD FRML MDRD: 94 ML/MIN/1.73
GLOBULIN UR ELPH-MCNC: 3.7 GM/DL
GLUCOSE SERPL-MCNC: 157 MG/DL (ref 65–99)
HCG INTACT+B SERPL-ACNC: <0.5 MIU/ML
HCT VFR BLD AUTO: 35.8 % (ref 34–46.6)
HGB BLD-MCNC: 11.1 G/DL (ref 12–15.9)
HOLD SPECIMEN: NORMAL
HOLD SPECIMEN: NORMAL
IMM GRANULOCYTES # BLD AUTO: 0.01 10*3/MM3 (ref 0–0.05)
IMM GRANULOCYTES NFR BLD AUTO: 0.2 % (ref 0–0.5)
LIPASE SERPL-CCNC: 20 U/L (ref 13–60)
LYMPHOCYTES # BLD AUTO: 2.99 10*3/MM3 (ref 0.7–3.1)
LYMPHOCYTES NFR BLD AUTO: 48.1 % (ref 19.6–45.3)
MCH RBC QN AUTO: 22.5 PG (ref 26.6–33)
MCHC RBC AUTO-ENTMCNC: 31 G/DL (ref 31.5–35.7)
MCV RBC AUTO: 72.5 FL (ref 79–97)
MONOCYTES # BLD AUTO: 0.36 10*3/MM3 (ref 0.1–0.9)
MONOCYTES NFR BLD AUTO: 5.8 % (ref 5–12)
NEUTROPHILS NFR BLD AUTO: 2.77 10*3/MM3 (ref 1.7–7)
NEUTROPHILS NFR BLD AUTO: 44.6 % (ref 42.7–76)
NRBC BLD AUTO-RTO: 0 /100 WBC (ref 0–0.2)
PLATELET # BLD AUTO: 272 10*3/MM3 (ref 140–450)
PMV BLD AUTO: 8.8 FL (ref 6–12)
POTASSIUM SERPL-SCNC: 4.1 MMOL/L (ref 3.5–5.2)
PROT SERPL-MCNC: 7.9 G/DL (ref 6–8.5)
RBC # BLD AUTO: 4.94 10*6/MM3 (ref 3.77–5.28)
SODIUM SERPL-SCNC: 138 MMOL/L (ref 136–145)
WBC # BLD AUTO: 6.21 10*3/MM3 (ref 3.4–10.8)
WHOLE BLOOD HOLD SPECIMEN: NORMAL

## 2021-08-16 PROCEDURE — 83690 ASSAY OF LIPASE: CPT

## 2021-08-16 PROCEDURE — 99211 OFF/OP EST MAY X REQ PHY/QHP: CPT

## 2021-08-16 PROCEDURE — 85025 COMPLETE CBC W/AUTO DIFF WBC: CPT

## 2021-08-16 PROCEDURE — 36415 COLL VENOUS BLD VENIPUNCTURE: CPT

## 2021-08-16 PROCEDURE — 84702 CHORIONIC GONADOTROPIN TEST: CPT

## 2021-08-16 PROCEDURE — 80053 COMPREHEN METABOLIC PANEL: CPT

## 2021-08-16 RX ORDER — VALACYCLOVIR HYDROCHLORIDE 500 MG/1
1000 TABLET, FILM COATED ORAL
Status: DISCONTINUED | OUTPATIENT
Start: 2021-08-16 | End: 2021-08-16 | Stop reason: HOSPADM

## 2021-08-16 RX ORDER — TRAZODONE HYDROCHLORIDE 100 MG/1
100 TABLET ORAL NIGHTLY PRN
Qty: 30 TABLET | Refills: 1 | Status: SHIPPED | OUTPATIENT
Start: 2021-08-16

## 2021-08-16 RX ORDER — ESCITALOPRAM OXALATE 10 MG/1
10 TABLET ORAL DAILY
Qty: 30 TABLET | Refills: 1 | Status: SHIPPED | OUTPATIENT
Start: 2021-08-17

## 2021-08-16 RX ORDER — SODIUM CHLORIDE 0.9 % (FLUSH) 0.9 %
10 SYRINGE (ML) INJECTION AS NEEDED
Status: DISCONTINUED | OUTPATIENT
Start: 2021-08-16 | End: 2021-08-17 | Stop reason: HOSPADM

## 2021-08-16 RX ORDER — VALACYCLOVIR HYDROCHLORIDE 1 G/1
1000 TABLET, FILM COATED ORAL
Qty: 30 TABLET | Refills: 0 | Status: SHIPPED | OUTPATIENT
Start: 2021-08-16

## 2021-08-16 RX ORDER — FAMOTIDINE 20 MG/1
20 TABLET, FILM COATED ORAL
Status: DISCONTINUED | OUTPATIENT
Start: 2021-08-16 | End: 2021-08-16

## 2021-08-16 RX ORDER — HYDROXYZINE PAMOATE 50 MG/1
50 CAPSULE ORAL 2 TIMES DAILY PRN
Qty: 45 CAPSULE | Refills: 1 | Status: SHIPPED | OUTPATIENT
Start: 2021-08-16

## 2021-08-16 RX ADMIN — PANTOPRAZOLE SODIUM 40 MG: 40 TABLET, DELAYED RELEASE ORAL at 07:10

## 2021-08-16 RX ADMIN — VALACYCLOVIR HYDROCHLORIDE 1000 MG: 500 TABLET, FILM COATED ORAL at 11:25

## 2021-08-16 RX ADMIN — LEVETIRACETAM 500 MG: 500 TABLET ORAL at 09:43

## 2021-08-16 RX ADMIN — ESCITALOPRAM 10 MG: 10 TABLET, FILM COATED ORAL at 09:43

## 2021-08-17 ENCOUNTER — HOSPITAL ENCOUNTER (EMERGENCY)
Facility: HOSPITAL | Age: 26
Discharge: PSYCHIATRIC OR SUBSTANCE USE FACILITY | End: 2021-08-17
Attending: EMERGENCY MEDICINE | Admitting: EMERGENCY MEDICINE

## 2021-08-17 ENCOUNTER — APPOINTMENT (OUTPATIENT)
Dept: ULTRASOUND IMAGING | Facility: HOSPITAL | Age: 26
End: 2021-08-17

## 2021-08-17 ENCOUNTER — HOSPITAL ENCOUNTER (EMERGENCY)
Facility: HOSPITAL | Age: 26
Discharge: HOME | End: 2021-08-17
Attending: EMERGENCY MEDICINE | Admitting: EMERGENCY MEDICINE

## 2021-08-17 VITALS
TEMPERATURE: 98.2 F | BODY MASS INDEX: 29.65 KG/M2 | DIASTOLIC BLOOD PRESSURE: 72 MMHG | SYSTOLIC BLOOD PRESSURE: 144 MMHG | WEIGHT: 167.33 LBS | HEART RATE: 79 BPM | HEIGHT: 63 IN | RESPIRATION RATE: 16 BRPM | OXYGEN SATURATION: 99 %

## 2021-08-17 VITALS
SYSTOLIC BLOOD PRESSURE: 116 MMHG | WEIGHT: 167.33 LBS | HEART RATE: 72 BPM | HEIGHT: 63 IN | BODY MASS INDEX: 29.65 KG/M2 | DIASTOLIC BLOOD PRESSURE: 74 MMHG | OXYGEN SATURATION: 99 % | TEMPERATURE: 98.2 F | RESPIRATION RATE: 16 BRPM

## 2021-08-17 DIAGNOSIS — R10.84 GENERALIZED ABDOMINAL PAIN: Primary | ICD-10-CM

## 2021-08-17 DIAGNOSIS — F19.10 POLYSUBSTANCE ABUSE (HCC): Primary | ICD-10-CM

## 2021-08-17 DIAGNOSIS — N39.0 URINARY TRACT INFECTION WITHOUT HEMATURIA, SITE UNSPECIFIED: ICD-10-CM

## 2021-08-17 DIAGNOSIS — N89.8 VAGINAL DISCHARGE: ICD-10-CM

## 2021-08-17 LAB
ALBUMIN SERPL-MCNC: 4.1 G/DL (ref 3.5–5.2)
ALBUMIN/GLOB SERPL: 1.1 G/DL
ALP SERPL-CCNC: 105 U/L (ref 39–117)
ALT SERPL W P-5'-P-CCNC: 34 U/L (ref 1–33)
AMPHET+METHAMPHET UR QL: NEGATIVE
ANION GAP SERPL CALCULATED.3IONS-SCNC: 10.1 MMOL/L (ref 5–15)
AST SERPL-CCNC: 23 U/L (ref 1–32)
BACTERIA UR QL AUTO: ABNORMAL /HPF
BARBITURATES UR QL SCN: NEGATIVE
BENZODIAZ UR QL SCN: NEGATIVE
BILIRUB SERPL-MCNC: 0.4 MG/DL (ref 0–1.2)
BILIRUB UR QL STRIP: NEGATIVE
BUN SERPL-MCNC: 10 MG/DL (ref 6–20)
BUN/CREAT SERPL: 13.3 (ref 7–25)
C TRACH RRNA CVX QL NAA+PROBE: NOT DETECTED
CALCIUM SPEC-SCNC: 9.4 MG/DL (ref 8.6–10.5)
CANNABINOIDS SERPL QL: NEGATIVE
CHLORIDE SERPL-SCNC: 102 MMOL/L (ref 98–107)
CLARITY UR: CLEAR
CO2 SERPL-SCNC: 25.9 MMOL/L (ref 22–29)
COCAINE UR QL: NEGATIVE
COLOR UR: YELLOW
CREAT SERPL-MCNC: 0.75 MG/DL (ref 0.57–1)
ETHANOL BLD-MCNC: <10 MG/DL (ref 0–10)
ETHANOL UR QL: <0.01 %
GFR SERPL CREATININE-BSD FRML MDRD: 93 ML/MIN/1.73
GLOBULIN UR ELPH-MCNC: 3.9 GM/DL
GLUCOSE SERPL-MCNC: 132 MG/DL (ref 65–99)
GLUCOSE UR STRIP-MCNC: NEGATIVE MG/DL
HCG SERPL QL: NEGATIVE
HGB UR QL STRIP.AUTO: NEGATIVE
HIV1+2 AB SER QL: NORMAL
HOLD SPECIMEN: NORMAL
HYALINE CASTS UR QL AUTO: ABNORMAL /LPF
KETONES UR QL STRIP: NEGATIVE
LEUKOCYTE ESTERASE UR QL STRIP.AUTO: ABNORMAL
LIPASE SERPL-CCNC: 21 U/L (ref 13–60)
METHADONE UR QL SCN: NEGATIVE
N GONORRHOEA RRNA SPEC QL NAA+PROBE: NOT DETECTED
NITRITE UR QL STRIP: NEGATIVE
OPIATES UR QL: NEGATIVE
OXYCODONE UR QL SCN: NEGATIVE
PH UR STRIP.AUTO: 7.5 [PH] (ref 5–8)
POTASSIUM SERPL-SCNC: 3.8 MMOL/L (ref 3.5–5.2)
PROT SERPL-MCNC: 8 G/DL (ref 6–8.5)
PROT UR QL STRIP: NEGATIVE
RBC # UR: ABNORMAL /HPF
REF LAB TEST METHOD: ABNORMAL
SODIUM SERPL-SCNC: 138 MMOL/L (ref 136–145)
SP GR UR STRIP: 1.02 (ref 1–1.03)
SQUAMOUS #/AREA URNS HPF: ABNORMAL /HPF
UROBILINOGEN UR QL STRIP: ABNORMAL
WBC UR QL AUTO: ABNORMAL /HPF
WHOLE BLOOD HOLD SPECIMEN: NORMAL

## 2021-08-17 PROCEDURE — 25010000002 CEFTRIAXONE PER 250 MG: Performed by: EMERGENCY MEDICINE

## 2021-08-17 PROCEDURE — G0432 EIA HIV-1/HIV-2 SCREEN: HCPCS | Performed by: EMERGENCY MEDICINE

## 2021-08-17 PROCEDURE — 82077 ASSAY SPEC XCP UR&BREATH IA: CPT | Performed by: EMERGENCY MEDICINE

## 2021-08-17 PROCEDURE — 63710000001 ONDANSETRON ODT 4 MG TABLET DISPERSIBLE: Performed by: EMERGENCY MEDICINE

## 2021-08-17 PROCEDURE — 80307 DRUG TEST PRSMV CHEM ANLYZR: CPT | Performed by: EMERGENCY MEDICINE

## 2021-08-17 PROCEDURE — 96372 THER/PROPH/DIAG INJ SC/IM: CPT

## 2021-08-17 PROCEDURE — 99284 EMERGENCY DEPT VISIT MOD MDM: CPT

## 2021-08-17 PROCEDURE — 84703 CHORIONIC GONADOTROPIN ASSAY: CPT | Performed by: EMERGENCY MEDICINE

## 2021-08-17 PROCEDURE — 87591 N.GONORRHOEAE DNA AMP PROB: CPT | Performed by: EMERGENCY MEDICINE

## 2021-08-17 PROCEDURE — 83690 ASSAY OF LIPASE: CPT | Performed by: EMERGENCY MEDICINE

## 2021-08-17 PROCEDURE — 25010000002 DROPERIDOL PER 5 MG: Performed by: EMERGENCY MEDICINE

## 2021-08-17 PROCEDURE — 99283 EMERGENCY DEPT VISIT LOW MDM: CPT

## 2021-08-17 PROCEDURE — 76705 ECHO EXAM OF ABDOMEN: CPT

## 2021-08-17 PROCEDURE — 80053 COMPREHEN METABOLIC PANEL: CPT | Performed by: EMERGENCY MEDICINE

## 2021-08-17 PROCEDURE — 81001 URINALYSIS AUTO W/SCOPE: CPT | Performed by: EMERGENCY MEDICINE

## 2021-08-17 PROCEDURE — 87491 CHLMYD TRACH DNA AMP PROBE: CPT | Performed by: EMERGENCY MEDICINE

## 2021-08-17 PROCEDURE — 87086 URINE CULTURE/COLONY COUNT: CPT | Performed by: EMERGENCY MEDICINE

## 2021-08-17 RX ORDER — CEPHALEXIN 500 MG/1
500 CAPSULE ORAL 2 TIMES DAILY
Qty: 14 CAPSULE | Refills: 0 | Status: SHIPPED | OUTPATIENT
Start: 2021-08-17

## 2021-08-17 RX ORDER — NAPROXEN 500 MG/1
500 TABLET ORAL 2 TIMES DAILY WITH MEALS
Qty: 14 TABLET | Refills: 0 | Status: SHIPPED | OUTPATIENT
Start: 2021-08-17

## 2021-08-17 RX ORDER — ONDANSETRON 2 MG/ML
4 INJECTION INTRAMUSCULAR; INTRAVENOUS ONCE
Status: DISCONTINUED | OUTPATIENT
Start: 2021-08-17 | End: 2021-08-17

## 2021-08-17 RX ORDER — ONDANSETRON 4 MG/1
4 TABLET, ORALLY DISINTEGRATING ORAL ONCE
Status: COMPLETED | OUTPATIENT
Start: 2021-08-17 | End: 2021-08-17

## 2021-08-17 RX ORDER — FLUCONAZOLE 150 MG/1
150 TABLET ORAL ONCE
Qty: 1 TABLET | Refills: 0 | Status: SHIPPED | OUTPATIENT
Start: 2021-08-17 | End: 2021-08-17

## 2021-08-17 RX ORDER — SODIUM CHLORIDE 0.9 % (FLUSH) 0.9 %
10 SYRINGE (ML) INJECTION AS NEEDED
Status: DISCONTINUED | OUTPATIENT
Start: 2021-08-17 | End: 2021-08-17 | Stop reason: HOSPADM

## 2021-08-17 RX ORDER — IBUPROFEN 400 MG/1
800 TABLET ORAL ONCE
Status: COMPLETED | OUTPATIENT
Start: 2021-08-17 | End: 2021-08-17

## 2021-08-17 RX ORDER — ONDANSETRON 4 MG/1
4 TABLET, ORALLY DISINTEGRATING ORAL EVERY 8 HOURS PRN
Qty: 9 TABLET | Refills: 0 | Status: SHIPPED | OUTPATIENT
Start: 2021-08-17

## 2021-08-17 RX ORDER — FENTANYL CITRATE 50 UG/ML
50 INJECTION, SOLUTION INTRAMUSCULAR; INTRAVENOUS ONCE
Status: DISCONTINUED | OUTPATIENT
Start: 2021-08-17 | End: 2021-08-17

## 2021-08-17 RX ORDER — METRONIDAZOLE 500 MG/1
2000 TABLET ORAL ONCE
Status: COMPLETED | OUTPATIENT
Start: 2021-08-17 | End: 2021-08-17

## 2021-08-17 RX ORDER — AZITHROMYCIN 250 MG/1
1000 TABLET, FILM COATED ORAL ONCE
Status: COMPLETED | OUTPATIENT
Start: 2021-08-17 | End: 2021-08-17

## 2021-08-17 RX ORDER — DROPERIDOL 2.5 MG/ML
1.25 INJECTION, SOLUTION INTRAMUSCULAR; INTRAVENOUS ONCE
Status: COMPLETED | OUTPATIENT
Start: 2021-08-17 | End: 2021-08-17

## 2021-08-17 RX ADMIN — ONDANSETRON 4 MG: 4 TABLET, ORALLY DISINTEGRATING ORAL at 04:58

## 2021-08-17 RX ADMIN — IBUPROFEN 800 MG: 400 TABLET ORAL at 04:58

## 2021-08-17 RX ADMIN — LIDOCAINE HYDROCHLORIDE 250 MG: 10 INJECTION, SOLUTION EPIDURAL; INFILTRATION; INTRACAUDAL; PERINEURAL at 05:32

## 2021-08-17 RX ADMIN — DROPERIDOL 1.25 MG: 2.5 INJECTION, SOLUTION INTRAMUSCULAR; INTRAVENOUS at 06:38

## 2021-08-17 RX ADMIN — METRONIDAZOLE 2000 MG: 500 TABLET, FILM COATED ORAL at 04:53

## 2021-08-17 RX ADMIN — AZITHROMYCIN MONOHYDRATE 1000 MG: 250 TABLET ORAL at 04:53

## 2021-08-17 NOTE — ED PROVIDER NOTES
Time: 2:15 PM EDT  Arrived by: private car  Chief Complaint:   Chief Complaint   Patient presents with   • Psychiatric Evaluation     History provided by: Patient  History is limited by: N/A     History of Present Illness:  Patient is a 25 y.o. year old female that presents to the emergency department for suicidal ideation.    The patient was recently admitted into the hospital for psychiatric issues and was planning to be set up with inpatient substance abuse issues. She left yesterday because she wanted to get high. The patient wanted to use meth and heroin yesterday but could not find any drugs.     She also complains of abdominal pain that has been consistent since yesterday when she had an abdominal pain workup. She also complains of diarrhea with 6 episodes yesterday and one today. She also states that she is suicidal, hungry, and thirsty.       History provided by:  Patient       Patient Care Team  Primary Care Provider: Provider, No Known    Past Medical History:     Allergies   Allergen Reactions   • Morphine Anaphylaxis   • Peanut Oil Anaphylaxis   • Rocephin [Ceftriaxone] Nausea And Vomiting   • Sulfa Antibiotics Anaphylaxis   • Topiramate Anaphylaxis   • Iodine Other (See Comments)   • Latex Hives   • Shellfish-Derived Products Rash     Past Medical History:   Diagnosis Date   • Borderline personality disorder (CMS/HCC)    • Diabetes mellitus (CMS/HCC)    • Endocarditis    • Endocarditis    • Hepatitis C    • Mood disorder (CMS/HCC)    • PTSD (post-traumatic stress disorder)    • Seizures (CMS/HCC)      Past Surgical History:   Procedure Laterality Date   • BACK SURGERY     •  SECTION       History reviewed. No pertinent family history.    Home Medications:  Prior to Admission medications    Medication Sig Start Date End Date Taking? Authorizing Provider   cephalexin (KEFLEX) 500 MG capsule Take 1 capsule by mouth 2 (Two) Times a Day. 21   Eyal Milan DO   escitalopram (LEXAPRO) 10 MG  tablet Take 1 tablet by mouth Daily. Indications: Major Depressive Disorder 8/17/21   Gutierrez Palma MD   fluconazole (DIFLUCAN) 150 MG tablet Take 1 tablet by mouth 1 (One) Time for 1 dose. 8/17/21 8/17/21  Eyal Milan DO   hydrOXYzine pamoate (VISTARIL) 50 MG capsule Take 1 capsule by mouth 2 (Two) Times a Day As Needed for Anxiety. Indications: Feeling Anxious 8/16/21   Gutierrez Palma MD   levETIRAcetam (KEPPRA) 500 MG tablet Take 1 tablet by mouth Every 12 (Twelve) Hours. 6/16/21   Bob Bruce,    naproxen (EC NAPROSYN) 500 MG EC tablet Take 1 tablet by mouth 2 (Two) Times a Day With Meals. 8/17/21   Eyal Milan DO   omeprazole (PrilOSEC) 20 MG capsule Take 1 capsule by mouth Daily. 6/16/21   Bob Bruce,    ondansetron ODT (ZOFRAN-ODT) 4 MG disintegrating tablet Place 1 tablet on the tongue Every 8 (Eight) Hours As Needed for Nausea. 8/17/21   Eyal Milan DO   traZODone (DESYREL) 100 MG tablet Take 1 tablet by mouth At Night As Needed for Sleep. Indications: Trouble Sleeping 8/16/21   Gutierrez Palma MD   valACYclovir (VALTREX) 1000 MG tablet Take 1 tablet by mouth Daily. Indications: Infection of the Skin and/or Soft Tissue 8/16/21   Gutierrez Palma MD        Social History:   Social History     Tobacco Use   • Smoking status: Never Smoker   • Smokeless tobacco: Never Used   Vaping Use   • Vaping Use: Never used   Substance Use Topics   • Alcohol use: Never   • Drug use: Not Currently     Types: IV, Methamphetamines, Marijuana     Recent travel: not applicable     Review of Systems:  Review of Systems   Constitutional: Negative for chills and fever.        Hungry and thirsty   HENT: Negative for congestion, ear pain and sore throat.    Eyes: Negative for pain.   Respiratory: Negative for cough, chest tightness and shortness of breath.    Cardiovascular: Negative for chest pain.   Gastrointestinal: Positive for abdominal pain and diarrhea. Negative for nausea and vomiting.   Genitourinary: Negative for  "flank pain and hematuria.   Musculoskeletal: Negative for joint swelling.   Skin: Negative for pallor.   Neurological: Negative for seizures and headaches.   Psychiatric/Behavioral: Positive for suicidal ideas.   All other systems reviewed and are negative.       Physical Exam:  /74 (BP Location: Left arm, Patient Position: Lying)   Pulse 72   Temp 98.2 °F (36.8 °C) (Oral)   Resp 16   Ht 160 cm (63\")   Wt 75.9 kg (167 lb 5.3 oz)   LMP 08/14/2021   SpO2 99%   BMI 29.64 kg/m²     Physical Exam  Vitals and nursing note reviewed.   Constitutional:       General: She is not in acute distress.     Appearance: Normal appearance. She is not toxic-appearing.      Comments: Patient is eating and drinking.   HENT:      Head: Normocephalic and atraumatic.      Mouth/Throat:      Mouth: Mucous membranes are moist.   Eyes:      Extraocular Movements: Extraocular movements intact.      Pupils: Pupils are equal, round, and reactive to light.      Comments: Dilated but equal.   Cardiovascular:      Rate and Rhythm: Normal rate and regular rhythm.      Pulses: Normal pulses.      Heart sounds: Normal heart sounds.   Pulmonary:      Effort: Pulmonary effort is normal. No respiratory distress.      Breath sounds: Normal breath sounds.   Abdominal:      General: Abdomen is flat.      Palpations: Abdomen is soft.      Tenderness: There is no abdominal tenderness.   Musculoskeletal:         General: Normal range of motion.      Cervical back: Normal range of motion and neck supple.   Skin:     General: Skin is warm and dry.      Findings: No rash.   Neurological:      Mental Status: She is alert and oriented to person, place, and time. Mental status is at baseline.   Psychiatric:      Comments: Suicidal ideation                Medications in the Emergency Department:  Medications - No data to display     Labs  Lab Results (last 24 hours)     Procedure Component Value Units Date/Time    Urinalysis With Microscopic If " Indicated (No Culture) - Urine, Clean Catch [[id number]]  (Abnormal) Collected: 08/17/21 0430    Specimen: Urine, Clean Catch Updated: 08/17/21 0439     Color, UA Yellow     Appearance, UA Clear     pH, UA 7.5     Specific Gravity, UA 1.019     Glucose, UA Negative     Ketones, UA Negative     Bilirubin, UA Negative     Blood, UA Negative     Protein, UA Negative     Leuk Esterase, UA Moderate (2+)     Nitrite, UA Negative     Urobilinogen, UA 1.0 E.U./dL    Urinalysis, Microscopic Only - Urine, Clean Catch [[id number]]  (Abnormal) Collected: 08/17/21 0430    Specimen: Urine, Clean Catch Updated: 08/17/21 0439     RBC, UA 0-2 /HPF      WBC, UA 21-30 /HPF      Bacteria, UA None Seen /HPF      Squamous Epithelial Cells, UA 3-6 /HPF      Hyaline Casts, UA 0-2 /LPF      Methodology Automated Microscopy    Urine Culture - Urine, Urine, Clean Catch [[id number]] Collected: 08/17/21 0430    Specimen: Urine, Clean Catch Updated: 08/17/21 0520    Comprehensive Metabolic Panel [[id number]]  (Abnormal) Collected: 08/17/21 0452    Specimen: Blood Updated: 08/17/21 0529     Glucose 132 mg/dL      BUN 10 mg/dL      Creatinine 0.75 mg/dL      Sodium 138 mmol/L      Potassium 3.8 mmol/L      Chloride 102 mmol/L      CO2 25.9 mmol/L      Calcium 9.4 mg/dL      Total Protein 8.0 g/dL      Albumin 4.10 g/dL      ALT (SGPT) 34 U/L      AST (SGOT) 23 U/L      Alkaline Phosphatase 105 U/L      Total Bilirubin 0.4 mg/dL      eGFR Non African Amer 93 mL/min/1.73      Globulin 3.9 gm/dL      A/G Ratio 1.1 g/dL      BUN/Creatinine Ratio 13.3     Anion Gap 10.1 mmol/L     Narrative:      GFR Normal >60  Chronic Kidney Disease <60  Kidney Failure <15      Lipase [[id number]]  (Normal) Collected: 08/17/21 0452    Specimen: Blood Updated: 08/17/21 0529     Lipase 21 U/L     HIV-1 & HIV-2 Antibodies [[id number]]  (Normal) Collected: 08/17/21 0452    Specimen: Blood Updated: 08/17/21 0529     HIV-1/ HIV-2 Non-Reactive    Chlamydia trachomatis, Neisseria  gonorrhoeae, PCR - Swab, Urine, Clean Catch [[id number]]  (Normal) Collected: 08/17/21 0536    Specimen: Swab from Urine, Clean Catch Updated: 08/17/21 0942     Chlamydia DNA by PCR Not Detected     Neisseria gonorrhoeae by PCR Not Detected    Ethanol [[id number]] Collected: 08/17/21 1347    Specimen: Blood Updated: 08/17/21 1418     Ethanol <10 mg/dL      Ethanol % <0.010 %     Narrative:      Ethanol (Plasma)  <10 Essentially Negative    Toxic Concentrations           mg/dL    Flushing, slowing of reflexes    Impaired visual activity         Depression of CNS              >100  Possible Coma                  >300       hCG, Serum, Qualitative [[id number]]  (Normal) Collected: 08/17/21 1347    Specimen: Blood Updated: 08/17/21 1442     HCG Qualitative Negative    Narrative:      Sensitive immunoassays may demonstrate false positive results  with specimens containing heterophilic antibodies. Assays may  also exhibit false-positive or false-negative results with  specimens containing human anti-mouse antibodies. These   specimens may come from patients receving preparations of  mouse monoclonal antibodies for diagnosis or therapy or having  been exposed to mice. If the qualitative interpretation is  inconsistent with the clinical evaluation, results should be   confirmed by an alternate hCG method, ie. quantitative hCG.    Urine Drug Screen - Urine, Clean Catch [[id number]]  (Normal) Collected: 08/17/21 1441    Specimen: Urine, Clean Catch Updated: 08/17/21 1610     Amphet/Methamphet, Screen Negative     Barbiturates Screen, Urine Negative     Benzodiazepine Screen, Urine Negative     Cocaine Screen, Urine Negative     Opiate Screen Negative     THC, Screen, Urine Negative     Methadone Screen, Urine Negative     Oxycodone Screen, Urine Negative    Narrative:      Negative Thresholds Per Drugs Screened:    Amphetamines                 500 ng/ml  Barbiturates                 200 ng/ml  Benzodiazepines               100 ng/ml  Cocaine                      300 ng/ml  Methadone                    300 ng/ml  Opiates                      300 ng/ml  Oxycodone                    100 ng/ml  THC                           50 ng/ml    The Normal Value for all drugs tested is negative. This report includes final unconfirmed screening results to be used for medical treatment purposes only. Unconfirmed results must not be used for non-medical purposes such as employment or legal testing. Clinical consideration should be applied to any drug of abuse test, particularly when unconfirmed results are used.                   Imaging:  US Gallbladder    Result Date: 8/17/2021  PROCEDURE: US GALLBLADDER  COMPARISON:  INDICATIONS: RUQ pain, nausea  TECHNIQUE: A limited ultrasound examination of the right upper quadrant was performed.   FINDINGS:  The liver is homogeneous.  There are no focal liver lesions.  Portal venous and hepatic venous flows are normal.  There is a small gallbladder polyp.  There are no gallstones.  There is no pericholecystic fluid.  The common bile duct is 5 mm.  The visualized pancreatic tissue is normal.  The right kidney measures 10.6 cm in pole to pole length and there is no hydronephrosis.  CONCLUSION: Small gallbladder polyp.     ALEX CHAMBERS MD       Electronically Signed and Approved By: ALEX CHAMBERS MD on 8/17/2021 at 5:44               Procedures:  Procedures    Progress  ED Course as of Aug 18 0002   Tue Aug 17, 2021   1428 Discussed patient's case with .  Patient is to be discharged from the emergency department, and is accepted to a 2-day outpatient facility stay.    [RP]   1516 Spoke with  regarding the patient's plan of care.     [LG]      ED Course User Index  [LG] Celso Valenzuela  [RP] Seth Goldberg MD                            Medical Decision Making:  MDM  Number of Diagnoses or Management Options  Polysubstance abuse (CMS/HCC): established and worsening     Amount and/or  Complexity of Data Reviewed  Clinical lab tests: reviewed    Risk of Complications, Morbidity, and/or Mortality  Presenting problems: moderate  Management options: low    Patient Progress  Patient progress: stable       Final diagnoses:   Polysubstance abuse (CMS/HCC)        Disposition:  ED Disposition     ED Disposition Condition Comment    Discharge Stable           Documentation assistance provided by Celso Valenzuela acting as scribe for Seth Goldberg MD. Information recorded by the scribe was done at my direction and has been verified and validated by me.        Celso Valenzuela  08/17/21 1423       Seth Goldberg MD  08/18/21 0002

## 2021-08-17 NOTE — ED TRIAGE NOTES
Pt to ED triage via EMS for abd pain/vaginal discharge. Pt reports recently admitted to this hospital and notified staff of abd pain and per pt there was no evaluation of pain/discharge. Pt reports having discharge start a couple days ago w/ her period. Pt describes discharge as brown and odorous.  Pt presents a/ox4, gcs 15 and is rocking back and forth.

## 2021-08-17 NOTE — SIGNIFICANT NOTE
"Pt is a 26yo female who presented to the ED for a psychiatric evaluation.     Pt was discharged from [facility] on 8/16/21 in stable condition. Pt denied any SI, HI, or any other psychiatric complaints at [facility]. Pt is now back in the ED with the same complaints as her [facility] admission. Pt's SI appear to be a chronic issue and often present when Pt feels hopeless about not having any place to go. Pt does NOT indicate any plan or intents to end her life. Pt asks several times throughout the interview about getting transportation back to [place]. ED therapist explained to Pt that we would not be able to assist with transportation back to [place].   Pt denied any HI, intents, or plans. Pt denied any psychosis and was AOX4.     ED therapist provided Pt with options to either the [facility] or to [facility].   Pt is agreeable to [facility].     [facility] Pt will transfer via cab.   ED MD aware and is agreeable.        08/17/21 1436   Behavior WDL   Interactions argumentative   Motor Movement restless   Emotion Mood WDL   Affect flat   Emotion/Mood anxious   Perceptual State WDL   Perceptual State WDL WDL   Thought Process WDL   Delusions no delusions   Judgment and Insight denies responsibility  (Pt was offered help with substance use treatemtn yesterday and refused. Pt appears to minimize her issues and is now seeking a place to stay.)   Coping/Stress   Patient Personal Strengths expressive of needs   Techniques to [place] with Loss/Stress/Change substance use   Coping/Stress Comments \"I don't have my medicine and I feel hopeless out there by myself.\" Pt stated that she feels more hopeless than anything in regards to her current situation. Pt stated that she wants to go to a recovery program.   Developmental Stage (Eriksson's)   Developmental Stage Stage 6 (18-35 years/Young Adulthood) Intimacy vs. Isolation   C-SSRS (Recent)   Q1 Wished to be Dead (Past Month) yes   Q2 Suicidal Thoughts (Past Month) " yes   Q3 Suicidal Thought Method no   Q4 Suicidal Intent without Specific Plan no   Q5 Suicide Intent with Specific Plan no   Q6 Suicide Behavior (Lifetime) no  (Pt stated that she feels hopeless more than anything and wants to get into a Recovery program. Pt does not feel like she would end her life.)   Level of Risk per Screen low risk

## 2021-08-17 NOTE — ED TRIAGE NOTES
Walked into a place of business and stated that she wanted to kill herself. Place of business called 911. Police arrived and client requested to be transported to [facility] for SI. Has a plan to jump out infront of traffic.

## 2021-08-17 NOTE — ED PROVIDER NOTES
"Time: 4:04 AM EDT  Arrived by: private car  Chief Complaint:   Chief Complaint   Patient presents with   • Abdominal Pain   • Vaginal Discharge     History provided by: pt  History is limited by: N/A     History of Present Illness:  Patient is a 25 y.o. year old female that presents to the emergency department with ABDOMINAL PAIN.    Pt states she has abdominal pain for one week. Pt has some brown, milky discharge for two days.  Pt has hx of E. Coli, Hep C, and herpes. 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  Pt denies any previous pain. Pt states she may be at risk for a new STD.      History provided by:  Patient   used: No    Abdominal Pain  Pain location:  RUQ  Pain quality comment:  \"pain\"  Pain radiates to:  Does not radiate  Pain severity:  Moderate  Onset quality:  Gradual  Duration:  1 week  Timing:  Constant  Progression:  Unchanged  Chronicity:  New  Context: not alcohol use, not awakening from sleep, not diet changes, not eating, not laxative use, not medication withdrawal, not previous surgeries, not recent illness, not recent sexual activity, not recent travel, not retching, not sick contacts, not suspicious food intake and not trauma    Relieved by:  None tried  Worsened by:  Nothing  Ineffective treatments:  None tried  Associated symptoms: vaginal discharge    Associated symptoms: no chills, no diarrhea, no dysuria, no fever, no hematuria, no nausea, no shortness of breath, no sore throat and no vomiting    Vaginal Discharge  Associated symptoms: abdominal pain    Associated symptoms: no dysuria, no fever, no nausea and no vomiting        Similar Symptoms Previously: none  Recently seen: not recently seen in this ED    Patient Care Team  Primary Care Provider: Provider, No Known    Past Medical History:     Allergies   Allergen Reactions   • Morphine Anaphylaxis   • Peanut Oil Anaphylaxis   • Rocephin [Ceftriaxone] Nausea And Vomiting   • Sulfa Antibiotics Anaphylaxis   • " Topiramate Anaphylaxis   • Iodine Other (See Comments)   • Latex Hives   • Shellfish-Derived Products Rash     Past Medical History:   Diagnosis Date   • Borderline personality disorder (CMS/HCC)    • Diabetes mellitus (CMS/HCC)    • Endocarditis    • Endocarditis    • Hepatitis C    • Mood disorder (CMS/HCC)    • PTSD (post-traumatic stress disorder)    • Seizures (CMS/HCC)      Past Surgical History:   Procedure Laterality Date   • BACK SURGERY     •  SECTION       History reviewed. No pertinent family history.    Home Medications:  Prior to Admission medications    Medication Sig Start Date End Date Taking? Authorizing Provider   escitalopram (LEXAPRO) 10 MG tablet Take 1 tablet by mouth Daily. Indications: Major Depressive Disorder 21   Gutierrez Palma MD   hydrOXYzine pamoate (VISTARIL) 50 MG capsule Take 1 capsule by mouth 2 (Two) Times a Day As Needed for Anxiety. Indications: Feeling Anxious 21   Gutierrez Palma MD   levETIRAcetam (KEPPRA) 500 MG tablet Take 1 tablet by mouth Every 12 (Twelve) Hours. 21   Bob Bruce, DO   omeprazole (PrilOSEC) 20 MG capsule Take 1 capsule by mouth Daily. 21   Bob Bruce, DO   traZODone (DESYREL) 100 MG tablet Take 1 tablet by mouth At Night As Needed for Sleep. Indications: Trouble Sleeping 21   Gutierrez Palma MD   valACYclovir (VALTREX) 1000 MG tablet Take 1 tablet by mouth Daily. Indications: Infection of the Skin and/or Soft Tissue 21   Gutierrez Palma MD        Social History:   Social History     Tobacco Use   • Smoking status: Never Smoker   • Smokeless tobacco: Never Used   Vaping Use   • Vaping Use: Never used   Substance Use Topics   • Alcohol use: Never   • Drug use: Not Currently     Types: IV, Methamphetamines, Marijuana     Recent travel: not applicable     Review of Systems:  Review of Systems   Constitutional: Negative for chills and fever.   HENT: Negative for congestion, ear pain, rhinorrhea, sore throat, tinnitus and trouble  "swallowing.    Eyes: Negative for photophobia and pain.   Respiratory: Negative for choking and shortness of breath.    Gastrointestinal: Positive for abdominal pain. Negative for diarrhea, nausea and vomiting.   Endocrine: Negative for polydipsia.   Genitourinary: Positive for vaginal discharge. Negative for difficulty urinating, dysuria and hematuria.   Musculoskeletal: Negative for back pain, joint swelling and neck pain.   Skin: Negative for rash.   Neurological: Negative for dizziness, seizures, speech difficulty, weakness, light-headedness, numbness and headaches.   Hematological: Negative for adenopathy.   Psychiatric/Behavioral: Negative for behavioral problems, confusion, self-injury and suicidal ideas.        Physical Exam:  /72 (BP Location: Right arm, Patient Position: Sitting)   Pulse 79   Temp 98.2 °F (36.8 °C) (Oral)   Resp 16   Ht 160 cm (63\")   Wt 75.9 kg (167 lb 5.3 oz)   LMP 08/14/2021   SpO2 99%   BMI 29.64 kg/m²     Physical Exam  Vitals and nursing note reviewed.   Constitutional:       General: She is awake.      Appearance: Normal appearance.   HENT:      Head: Normocephalic and atraumatic.      Right Ear: External ear normal.      Left Ear: External ear normal.      Nose: Nose normal.      Mouth/Throat:      Mouth: Mucous membranes are moist.      Pharynx: Oropharynx is clear.   Eyes:      General: Lids are normal.      Extraocular Movements: Extraocular movements intact.      Conjunctiva/sclera: Conjunctivae normal.      Pupils: Pupils are equal, round, and reactive to light.   Cardiovascular:      Rate and Rhythm: Normal rate and regular rhythm.      Pulses: Normal pulses.      Heart sounds: Normal heart sounds.   Pulmonary:      Effort: Pulmonary effort is normal.      Breath sounds: Normal breath sounds and air entry.   Abdominal:      Palpations: Abdomen is soft.      Tenderness: There is abdominal tenderness in the right upper quadrant.   Musculoskeletal:         General: " Normal range of motion.      Cervical back: Full passive range of motion without pain, normal range of motion and neck supple.   Skin:     General: Skin is warm and dry.   Neurological:      General: No focal deficit present.      Mental Status: She is alert and oriented to person, place, and time. Mental status is at baseline.      Cranial Nerves: Cranial nerves are intact.      Sensory: Sensation is intact.      Motor: Motor function is intact.      Coordination: Coordination is intact.   Psychiatric:         Attention and Perception: Attention and perception normal.         Mood and Affect: Mood and affect normal.         Speech: Speech normal.         Behavior: Behavior normal. Behavior is cooperative.         Thought Content: Thought content normal.         Cognition and Memory: Cognition and memory normal.                Medications in the Emergency Department:  Medications   sodium chloride 0.9 % flush 10 mL (has no administration in time range)   droperidol (INAPSINE) injection 1.25 mg (has no administration in time range)   cefTRIAXone (ROCEPHIN) 250 mg/ml in lidocaine 1% IM syringe (250 mg vial) (250 mg Intramuscular Given 8/17/21 0532)   azithromycin (ZITHROMAX) tablet 1,000 mg (1,000 mg Oral Given 8/17/21 0453)   metroNIDAZOLE (FLAGYL) tablet 2,000 mg (2,000 mg Oral Given 8/17/21 0453)   ibuprofen (ADVIL,MOTRIN) tablet 800 mg (800 mg Oral Given 8/17/21 0458)   ondansetron ODT (ZOFRAN-ODT) disintegrating tablet 4 mg (4 mg Oral Given 8/17/21 0458)        Labs  Lab Results (last 24 hours)     Procedure Component Value Units Date/Time    CBC & Differential [[id number]]  (Abnormal) Collected: 08/16/21 2150    Specimen: Blood Updated: 08/16/21 2202    Narrative:      The following orders were created for panel order CBC & Differential.  Procedure                               Abnormality         Status                     ---------                               -----------         ------                     CBC  Auto Differential[[id number]]        Abnormal            Final result               Scan Slide[[id number]]                                                                    Please view results for these tests on the individual orders.    Comprehensive Metabolic Panel [[id number]]  (Abnormal) Collected: 08/16/21 2150    Specimen: Blood Updated: 08/16/21 2229     Glucose 157 mg/dL      BUN 8 mg/dL      Creatinine 0.74 mg/dL      Sodium 138 mmol/L      Potassium 4.1 mmol/L      Chloride 99 mmol/L      CO2 27.4 mmol/L      Calcium 9.6 mg/dL      Total Protein 7.9 g/dL      Albumin 4.20 g/dL      ALT (SGPT) 37 U/L      AST (SGOT) 21 U/L      Alkaline Phosphatase 103 U/L      Total Bilirubin 0.3 mg/dL      eGFR Non African Amer 94 mL/min/1.73      Globulin 3.7 gm/dL      A/G Ratio 1.1 g/dL      BUN/Creatinine Ratio 10.8     Anion Gap 11.6 mmol/L     Narrative:      GFR Normal >60  Chronic Kidney Disease <60  Kidney Failure <15      Lipase [[id number]]  (Normal) Collected: 08/16/21 2150    Specimen: Blood Updated: 08/16/21 2229     Lipase 20 U/L     hCG, Quantitative, Pregnancy [[id number]] Collected: 08/16/21 2150    Specimen: Blood Updated: 08/16/21 2225     HCG Quantitative <0.50 mIU/mL     Narrative:      HCG Ranges by Gestational Age    Females - non-pregnant premenopausal   </= 1mIU/mL HCG  Females - postmenopausal               </= 7mIU/mL HCG    3 Weeks       5.4   -      72 mIU/mL  4 Weeks      10.2   -     708 mIU/mL  5 Weeks       217   -   8,245 mIU/mL  6 Weeks       152   -  32,177 mIU/mL  7 Weeks     4,059   - 153,767 mIU/mL  8 Weeks    31,366   - 149,094 mIU/mL  9 Weeks    59,109   - 135,901 mIU/mL  10 Weeks   44,186   - 170,409 mIU/mL  12 Weeks   27,107   - 201,615 mIU/mL  14 Weeks   24,302   -  93,646 mIU/mL  15 Weeks   12,540   -  69,747 mIU/mL  16 Weeks    8,904   -  55,332 mIU/mL  17 Weeks    8,240   -  51,793 mIU/mL  18 Weeks    9,649   -  55,271 mIU/mL    Results may be falsely decreased if patient taking  Biotin.      CBC Auto Differential [[id number]]  (Abnormal) Collected: 08/16/21 2150    Specimen: Blood Updated: 08/16/21 2202     WBC 6.21 10*3/mm3      RBC 4.94 10*6/mm3      Hemoglobin 11.1 g/dL      Hematocrit 35.8 %      MCV 72.5 fL      MCH 22.5 pg      MCHC 31.0 g/dL      RDW 14.3 %      RDW-SD 37.2 fl      MPV 8.8 fL      Platelets 272 10*3/mm3      Neutrophil % 44.6 %      Lymphocyte % 48.1 %      Monocyte % 5.8 %      Eosinophil % 1.1 %      Basophil % 0.2 %      Immature Grans % 0.2 %      Neutrophils, Absolute 2.77 10*3/mm3      Lymphocytes, Absolute 2.99 10*3/mm3      Monocytes, Absolute 0.36 10*3/mm3      Eosinophils, Absolute 0.07 10*3/mm3      Basophils, Absolute 0.01 10*3/mm3      Immature Grans, Absolute 0.01 10*3/mm3      nRBC 0.0 /100 WBC     CBC & Differential [[id number]] Updated: 08/17/21 0534    Specimen: Blood     Narrative:      The following orders were created for panel order CBC & Differential.  Procedure                               Abnormality         Status                     ---------                               -----------         ------                     CBC Auto Differential[[id number]]                                                         Please view results for these tests on the individual orders.    CBC Auto Differential [[id number]] Updated: 08/17/21 0534    Specimen: Blood     Urinalysis With Microscopic If Indicated (No Culture) - Urine, Clean Catch [[id number]]  (Abnormal) Collected: 08/17/21 0430    Specimen: Urine, Clean Catch Updated: 08/17/21 0439     Color, UA Yellow     Appearance, UA Clear     pH, UA 7.5     Specific Gravity, UA 1.019     Glucose, UA Negative     Ketones, UA Negative     Bilirubin, UA Negative     Blood, UA Negative     Protein, UA Negative     Leuk Esterase, UA Moderate (2+)     Nitrite, UA Negative     Urobilinogen, UA 1.0 E.U./dL    Urinalysis, Microscopic Only - Urine, Clean Catch [[id number]]  (Abnormal) Collected: 08/17/21 0430    Specimen:  Urine, Clean Catch Updated: 08/17/21 0439     RBC, UA 0-2 /HPF      WBC, UA 21-30 /HPF      Bacteria, UA None Seen /HPF      Squamous Epithelial Cells, UA 3-6 /HPF      Hyaline Casts, UA 0-2 /LPF      Methodology Automated Microscopy    Urine Culture - Urine, Urine, Clean Catch [[id number]] Collected: 08/17/21 0430    Specimen: Urine, Clean Catch Updated: 08/17/21 0520    Comprehensive Metabolic Panel [[id number]]  (Abnormal) Collected: 08/17/21 0452    Specimen: Blood Updated: 08/17/21 0529     Glucose 132 mg/dL      BUN 10 mg/dL      Creatinine 0.75 mg/dL      Sodium 138 mmol/L      Potassium 3.8 mmol/L      Chloride 102 mmol/L      CO2 25.9 mmol/L      Calcium 9.4 mg/dL      Total Protein 8.0 g/dL      Albumin 4.10 g/dL      ALT (SGPT) 34 U/L      AST (SGOT) 23 U/L      Alkaline Phosphatase 105 U/L      Total Bilirubin 0.4 mg/dL      eGFR Non African Amer 93 mL/min/1.73      Globulin 3.9 gm/dL      A/G Ratio 1.1 g/dL      BUN/Creatinine Ratio 13.3     Anion Gap 10.1 mmol/L     Narrative:      GFR Normal >60  Chronic Kidney Disease <60  Kidney Failure <15      Lipase [[id number]]  (Normal) Collected: 08/17/21 0452    Specimen: Blood Updated: 08/17/21 0529     Lipase 21 U/L     HIV-1 & HIV-2 Antibodies [[id number]]  (Normal) Collected: 08/17/21 0452    Specimen: Blood Updated: 08/17/21 0529     HIV-1/ HIV-2 Non-Reactive    Chlamydia trachomatis, Neisseria gonorrhoeae, PCR - Swab, Urine, Clean Catch [[id number]] Collected: 08/17/21 0536    Specimen: Swab from Urine, Clean Catch Updated: 08/17/21 0540           Imaging:  US Gallbladder    Result Date: 8/17/2021  PROCEDURE: US GALLBLADDER  COMPARISON:  INDICATIONS: RUQ pain, nausea  TECHNIQUE: A limited ultrasound examination of the right upper quadrant was performed.   FINDINGS:  The liver is homogeneous.  There are no focal liver lesions.  Portal venous and hepatic venous flows are normal.  There is a small gallbladder polyp.  There are no gallstones.  There is no  pericholecystic fluid.  The common bile duct is 5 mm.  The visualized pancreatic tissue is normal.  The right kidney measures 10.6 cm in pole to pole length and there is no hydronephrosis.  CONCLUSION: Small gallbladder polyp.     ALEX CHAMBERS MD       Electronically Signed and Approved By: ALEX CHAMBERS MD on 8/17/2021 at 5:44               Procedures:  Procedures    Progress                            Medical Decision Making:  MDM  Number of Diagnoses or Management Options     Amount and/or Complexity of Data Reviewed  Clinical lab tests: reviewed  Tests in the radiology section of CPT®: reviewed  Decide to obtain previous medical records or to obtain history from someone other than the patient: yes       Empirically treated for STI. No HIV. GB US fairly normal. Will encourage outpt followup. If worse to return to ED.   Final diagnoses:   Generalized abdominal pain   Vaginal discharge   Urinary tract infection without hematuria, site unspecified        Disposition:  ED Disposition     ED Disposition Condition Comment    Discharge Stable           Documentation assistance provided by Cecelia Perez acting as scribe for Eyal Milan DO. Information recorded by the scribe was done at my direction and has been verified and validated by me.          Cecelia Perez  08/17/21 [id number]       Eyal Milan DO  08/17/21 [id number]

## 2021-08-18 LAB — BACTERIA SPEC AEROBE CULT: NO GROWTH

## 2023-04-11 ENCOUNTER — APPOINTMENT (OUTPATIENT)
Dept: GENERAL RADIOLOGY | Facility: HOSPITAL | Age: 28
End: 2023-04-11
Payer: MEDICAID

## 2023-04-11 ENCOUNTER — HOSPITAL ENCOUNTER (EMERGENCY)
Facility: HOSPITAL | Age: 28
Discharge: HOME | End: 2023-04-12
Attending: EMERGENCY MEDICINE | Admitting: EMERGENCY MEDICINE
Payer: MEDICAID

## 2023-04-11 VITALS
RESPIRATION RATE: 22 BRPM | DIASTOLIC BLOOD PRESSURE: 64 MMHG | HEIGHT: 64 IN | WEIGHT: 177.91 LBS | SYSTOLIC BLOOD PRESSURE: 130 MMHG | BODY MASS INDEX: 30.37 KG/M2 | HEART RATE: 102 BPM | OXYGEN SATURATION: 99 % | TEMPERATURE: 97.9 F

## 2023-04-11 DIAGNOSIS — R73.9 HYPERGLYCEMIA: ICD-10-CM

## 2023-04-11 DIAGNOSIS — R07.9 CHEST PAIN, UNSPECIFIED TYPE: Primary | ICD-10-CM

## 2023-04-11 LAB
GLUCOSE BLDC GLUCOMTR-MCNC: 304 MG/DL (ref 70–99)
HOLD SPECIMEN: NORMAL
HOLD SPECIMEN: NORMAL
WHOLE BLOOD HOLD COAG: NORMAL
WHOLE BLOOD HOLD SPECIMEN: NORMAL

## 2023-04-11 PROCEDURE — 83880 ASSAY OF NATRIURETIC PEPTIDE: CPT | Performed by: EMERGENCY MEDICINE

## 2023-04-11 PROCEDURE — 80307 DRUG TEST PRSMV CHEM ANLYZR: CPT | Performed by: EMERGENCY MEDICINE

## 2023-04-11 PROCEDURE — 82962 GLUCOSE BLOOD TEST: CPT

## 2023-04-11 PROCEDURE — 83735 ASSAY OF MAGNESIUM: CPT | Performed by: EMERGENCY MEDICINE

## 2023-04-11 PROCEDURE — 93010 ELECTROCARDIOGRAM REPORT: CPT | Performed by: SPECIALIST

## 2023-04-11 PROCEDURE — 99284 EMERGENCY DEPT VISIT MOD MDM: CPT

## 2023-04-11 PROCEDURE — 93005 ELECTROCARDIOGRAM TRACING: CPT | Performed by: EMERGENCY MEDICINE

## 2023-04-11 PROCEDURE — 80053 COMPREHEN METABOLIC PANEL: CPT | Performed by: EMERGENCY MEDICINE

## 2023-04-11 PROCEDURE — 84484 ASSAY OF TROPONIN QUANT: CPT | Performed by: EMERGENCY MEDICINE

## 2023-04-11 PROCEDURE — 85025 COMPLETE CBC W/AUTO DIFF WBC: CPT | Performed by: EMERGENCY MEDICINE

## 2023-04-11 PROCEDURE — 63710000001 INSULIN REGULAR HUMAN PER 5 UNITS: Performed by: EMERGENCY MEDICINE

## 2023-04-11 PROCEDURE — 83690 ASSAY OF LIPASE: CPT | Performed by: EMERGENCY MEDICINE

## 2023-04-11 PROCEDURE — 71045 X-RAY EXAM CHEST 1 VIEW: CPT

## 2023-04-11 PROCEDURE — 93005 ELECTROCARDIOGRAM TRACING: CPT

## 2023-04-11 PROCEDURE — 36415 COLL VENOUS BLD VENIPUNCTURE: CPT

## 2023-04-11 RX ORDER — ASPIRIN 81 MG/1
324 TABLET, CHEWABLE ORAL ONCE
Status: DISCONTINUED | OUTPATIENT
Start: 2023-04-11 | End: 2023-04-12 | Stop reason: HOSPADM

## 2023-04-11 RX ORDER — SODIUM CHLORIDE 0.9 % (FLUSH) 0.9 %
10 SYRINGE (ML) INJECTION AS NEEDED
Status: DISCONTINUED | OUTPATIENT
Start: 2023-04-11 | End: 2023-04-12 | Stop reason: HOSPADM

## 2023-04-11 RX ADMIN — INSULIN HUMAN 7 UNITS: 100 INJECTION, SOLUTION PARENTERAL at 23:20

## 2023-04-11 RX ADMIN — SODIUM CHLORIDE 1000 ML: 9 INJECTION, SOLUTION INTRAVENOUS at 23:19

## 2023-04-12 LAB
ALBUMIN SERPL-MCNC: 3.8 G/DL (ref 3.5–5.2)
ALBUMIN/GLOB SERPL: 1.2 G/DL
ALP SERPL-CCNC: 88 U/L (ref 39–117)
ALT SERPL W P-5'-P-CCNC: 16 U/L (ref 1–33)
AMPHET+METHAMPHET UR QL: POSITIVE
ANION GAP SERPL CALCULATED.3IONS-SCNC: 11.7 MMOL/L (ref 5–15)
AST SERPL-CCNC: 11 U/L (ref 1–32)
BARBITURATES UR QL SCN: NEGATIVE
BASOPHILS # BLD AUTO: 0.02 10*3/MM3 (ref 0–0.2)
BASOPHILS NFR BLD AUTO: 0.3 % (ref 0–1.5)
BENZODIAZ UR QL SCN: NEGATIVE
BILIRUB SERPL-MCNC: <0.2 MG/DL (ref 0–1.2)
BUN SERPL-MCNC: 10 MG/DL (ref 6–20)
BUN/CREAT SERPL: 17.2 (ref 7–25)
CALCIUM SPEC-SCNC: 9 MG/DL (ref 8.6–10.5)
CANNABINOIDS SERPL QL: POSITIVE
CHLORIDE SERPL-SCNC: 98 MMOL/L (ref 98–107)
CO2 SERPL-SCNC: 21.3 MMOL/L (ref 22–29)
COCAINE UR QL: NEGATIVE
CREAT SERPL-MCNC: 0.58 MG/DL (ref 0.57–1)
DEPRECATED RDW RBC AUTO: 37.6 FL (ref 37–54)
EGFRCR SERPLBLD CKD-EPI 2021: 125.8 ML/MIN/1.73
EOSINOPHIL # BLD AUTO: 0.05 10*3/MM3 (ref 0–0.4)
EOSINOPHIL NFR BLD AUTO: 0.8 % (ref 0.3–6.2)
ERYTHROCYTE [DISTWIDTH] IN BLOOD BY AUTOMATED COUNT: 14.2 % (ref 12.3–15.4)
GLOBULIN UR ELPH-MCNC: 3.2 GM/DL
GLUCOSE BLDC GLUCOMTR-MCNC: 269 MG/DL (ref 70–99)
GLUCOSE SERPL-MCNC: 318 MG/DL (ref 65–99)
HCT VFR BLD AUTO: 36.5 % (ref 34–46.6)
HGB BLD-MCNC: 11.5 G/DL (ref 12–15.9)
HOLD SPECIMEN: NORMAL
IMM GRANULOCYTES # BLD AUTO: 0.01 10*3/MM3 (ref 0–0.05)
IMM GRANULOCYTES NFR BLD AUTO: 0.2 % (ref 0–0.5)
LIPASE SERPL-CCNC: 36 U/L (ref 13–60)
LYMPHOCYTES # BLD AUTO: 2.81 10*3/MM3 (ref 0.7–3.1)
LYMPHOCYTES NFR BLD AUTO: 45.7 % (ref 19.6–45.3)
MAGNESIUM SERPL-MCNC: 1.8 MG/DL (ref 1.6–2.6)
MCH RBC QN AUTO: 23.3 PG (ref 26.6–33)
MCHC RBC AUTO-ENTMCNC: 31.5 G/DL (ref 31.5–35.7)
MCV RBC AUTO: 73.9 FL (ref 79–97)
METHADONE UR QL SCN: NEGATIVE
MONOCYTES # BLD AUTO: 0.4 10*3/MM3 (ref 0.1–0.9)
MONOCYTES NFR BLD AUTO: 6.5 % (ref 5–12)
NEUTROPHILS NFR BLD AUTO: 2.86 10*3/MM3 (ref 1.7–7)
NEUTROPHILS NFR BLD AUTO: 46.5 % (ref 42.7–76)
NRBC BLD AUTO-RTO: 0 /100 WBC (ref 0–0.2)
NT-PROBNP SERPL-MCNC: 40.3 PG/ML (ref 0–450)
OPIATES UR QL: NEGATIVE
OXYCODONE UR QL SCN: NEGATIVE
PLATELET # BLD AUTO: 265 10*3/MM3 (ref 140–450)
PMV BLD AUTO: 9.1 FL (ref 6–12)
POTASSIUM SERPL-SCNC: 3.5 MMOL/L (ref 3.5–5.2)
PROT SERPL-MCNC: 7 G/DL (ref 6–8.5)
QT INTERVAL: 352 MS
RBC # BLD AUTO: 4.94 10*6/MM3 (ref 3.77–5.28)
SODIUM SERPL-SCNC: 131 MMOL/L (ref 136–145)
TROPONIN T SERPL HS-MCNC: <6 NG/L
WBC NRBC COR # BLD: 6.15 10*3/MM3 (ref 3.4–10.8)
WHOLE BLOOD HOLD COAG: NORMAL

## 2023-04-12 PROCEDURE — 82962 GLUCOSE BLOOD TEST: CPT

## 2023-04-12 NOTE — ED PROVIDER NOTES
Time: 10:44 PM EDT  Date of encounter:  2023  Independent Historian/Clinical History and Information was obtained by:   Patient  Chief Complaint: Chest pain      History is limited by: N/A    History of Present Illness:  Patient is a 27 y.o. year old female who presents to the emergency department for evaluation of patient states she has had chest pain for the past several days.  She is concerned that she has had previous episode of endocarditis and previous MI.  She denies any fevers or chills.  She denies any recent IV drug use.  She states her last IV drug use was greater than 1 month ago.  She states her last use of methamphetamine was 4 days ago.  She smokes marijuana daily.  She denies any shortness of breath at this time.  She has not taken her insulin in several days.    HPI    Patient Care Team  Primary Care Provider: Provider, No Known    Past Medical History:     Allergies   Allergen Reactions   • Morphine Anaphylaxis   • Peanut Oil Anaphylaxis   • Rocephin [Ceftriaxone] Nausea And Vomiting   • Sulfa Antibiotics Anaphylaxis   • Topiramate Anaphylaxis   • Iodine Other (See Comments)   • Latex Hives   • Shellfish-Derived Products Rash     Past Medical History:   Diagnosis Date   • Borderline personality disorder    • Diabetes mellitus    • Endocarditis    • Endocarditis    • Hepatitis C    • Mood disorder    • PTSD (post-traumatic stress disorder)    • Seizures      Past Surgical History:   Procedure Laterality Date   • BACK SURGERY     •  SECTION       No family history on file.    Home Medications:  Prior to Admission medications    Medication Sig Start Date End Date Taking? Authorizing Provider   cephalexin (KEFLEX) 500 MG capsule Take 1 capsule by mouth 2 (Two) Times a Day. 21   Eyal Milan DO   escitalopram (LEXAPRO) 10 MG tablet Take 1 tablet by mouth Daily. Indications: Major Depressive Disorder 21   Gutierrez Palma MD   hydrOXYzine pamoate (VISTARIL) 50 MG capsule Take 1  "capsule by mouth 2 (Two) Times a Day As Needed for Anxiety. Indications: Feeling Anxious 8/16/21   Gutierrez Palma MD   levETIRAcetam (KEPPRA) 500 MG tablet Take 1 tablet by mouth Every 12 (Twelve) Hours. 6/16/21   Bob Bruce, DO   naproxen (EC NAPROSYN) 500 MG EC tablet Take 1 tablet by mouth 2 (Two) Times a Day With Meals. 8/17/21   Eyal Milan, DO   omeprazole (PrilOSEC) 20 MG capsule Take 1 capsule by mouth Daily. 6/16/21   Bob Bruce, DO   ondansetron ODT (ZOFRAN-ODT) 4 MG disintegrating tablet Place 1 tablet on the tongue Every 8 (Eight) Hours As Needed for Nausea. 8/17/21   Eyal Milan, DO   traZODone (DESYREL) 100 MG tablet Take 1 tablet by mouth At Night As Needed for Sleep. Indications: Trouble Sleeping 8/16/21   Gutierrez Palma MD   valACYclovir (VALTREX) 1000 MG tablet Take 1 tablet by mouth Daily. Indications: Infection of the Skin and/or Soft Tissue 8/16/21   Gutierrez Palma MD        Social History:   Social History     Tobacco Use   • Smoking status: Never   • Smokeless tobacco: Never   Vaping Use   • Vaping Use: Never used   Substance Use Topics   • Alcohol use: Never   • Drug use: Not Currently     Types: IV, Methamphetamines, Marijuana         Review of Systems:  Review of Systems   Constitutional: Negative for chills and fever.   HENT: Negative for congestion, ear pain and sore throat.    Eyes: Negative for pain.   Respiratory: Positive for chest tightness. Negative for cough and shortness of breath.    Cardiovascular: Negative for chest pain.   Gastrointestinal: Negative for abdominal pain, diarrhea, nausea and vomiting.   Genitourinary: Negative for flank pain and hematuria.   Musculoskeletal: Negative for joint swelling.   Skin: Negative for pallor.   Neurological: Negative for seizures and headaches.   All other systems reviewed and are negative.       Physical Exam:  /64   Pulse 102   Temp 97.9 °F (36.6 °C) (Oral)   Resp 22   Ht 162.6 cm (64\")   Wt 80.7 kg (177 lb 14.6 oz)   " LMP 04/02/2023 (Approximate)   SpO2 99%   BMI 30.54 kg/m²     Physical Exam  Vitals and nursing note reviewed.   Constitutional:       General: She is not in acute distress.     Appearance: Normal appearance. She is not toxic-appearing.   HENT:      Head: Normocephalic and atraumatic.      Jaw: There is normal jaw occlusion.   Eyes:      General: Lids are normal.      Extraocular Movements: Extraocular movements intact.      Conjunctiva/sclera: Conjunctivae normal.      Pupils: Pupils are equal, round, and reactive to light.   Cardiovascular:      Rate and Rhythm: Normal rate and regular rhythm.      Pulses: Normal pulses.      Heart sounds: Normal heart sounds.   Pulmonary:      Effort: Pulmonary effort is normal. No respiratory distress.      Breath sounds: Normal breath sounds. No wheezing or rhonchi.   Abdominal:      General: Abdomen is flat.      Palpations: Abdomen is soft.      Tenderness: There is no abdominal tenderness. There is no guarding or rebound.   Musculoskeletal:         General: Normal range of motion.      Cervical back: Normal range of motion and neck supple.      Right lower leg: No edema.      Left lower leg: No edema.   Skin:     General: Skin is warm and dry.   Neurological:      Mental Status: She is alert and oriented to person, place, and time. Mental status is at baseline.   Psychiatric:         Mood and Affect: Mood normal.                  Procedures:  Procedures      Medical Decision Making:      Comorbidities that affect care:    Diabetes, Substance Abuse    External Notes reviewed:    Previous Clinic Note: Clinic note from February      The following orders were placed and all results were independently analyzed by me:  Orders Placed This Encounter   Procedures   • XR Chest 1 View   • [place] Draw   • High Sensitivity Troponin T   • Comprehensive Metabolic Panel   • Lipase   • BNP   • Magnesium   • CBC Auto Differential   • Urine Drug Screen - Urine, Clean Catch   • Undress &  Gown   • Continuous Pulse Oximetry   • POC Glucose Once   • POC Glucose STAT   • POC Glucose Once   • POC Glucose Once   • CBC & Differential   • Green Top (Gel)   • Lavender Top   • Gold Top - SST   • Light Blue Top   • Extra Tubes   • Gold Top - SST   • Light Blue Top       Medications Given in the Emergency Department:  Medications   sodium chloride 0.9 % bolus 1,000 mL (0 mL Intravenous Stopped 4/11/23 2349)   insulin regular (humuLIN R,novoLIN R) injection 7 Units (7 Units Intravenous Given 4/11/23 2320)        ED Course:    ED Course as of 04/12/23 0713   Tue Apr 11, 2023   2244 My interpretation of EKG: Sinus rhythm 96, normal P wave, normal QRS, normal ST, normal QT, unchanged. [JS]   Wed Apr 12, 2023   0107 HEART SCORE  History: Slightly Suspicious (0)  ECG: Normal (0)  Age: Less than 45 years (0)  Risk factors: 1-2 Risk Factors (1)  Troponin: normal (0)    This patient's HEART score is 1.    According to the HEART Score Study: Score (Risk of adverse cardiac event in the next 14 days)  Scores 0-3: (0.9-1.7%) In the HEART Score study, these patients were discharged home.  Scores 4-6: (12-16.6%)  In the HEART Score study, these patients were admitted to the hospital.  Scores =7: (50-65%) In the HEART Score study, these patients were candidates for early invasive measures.   Final disposition is based on individual provider judgement and current clinical situation.     [JS]      ED Course User Index  [JS] Theodore Rich MD       Labs:    Lab Results (last 24 hours)     Procedure Component Value Units Date/Time    POC Glucose Once [[id number]]  (Abnormal) Collected: 04/11/23 2253    Specimen: Blood Updated: 04/11/23 2254     Glucose 304 mg/dL      Comment: Serial Number: [id number]:  [id number]       High Sensitivity Troponin T [[id number]]  (Normal) Collected: 04/11/23 2316    Specimen: Blood Updated: 04/12/23 0022     HS Troponin T <6 ng/L     Narrative:      High Sensitive Troponin T Reference  Range:  <10.0 ng/L- Negative Female for AMI  <15.0 ng/L- Negative Male for AMI  >=10 - Abnormal Female indicating possible myocardial injury.  >=15 - Abnormal Male indicating possible myocardial injury.   Clinicians would have to utilize clinical acumen, EKG, Troponin, and serial changes to determine if it is an Acute Myocardial Infarction or myocardial injury due to an underlying chronic condition.         CBC & Differential [[id number]]  (Abnormal) Collected: 04/11/23 2316    Specimen: Blood Updated: 04/12/23 0009    Narrative:      The following orders were created for panel order CBC & Differential.  Procedure                               Abnormality         Status                     ---------                               -----------         ------                     CBC Auto Differential[[id number]]        Abnormal            Final result               Scan Slide[[id number]]                                                                    Please view results for these tests on the individual orders.    Comprehensive Metabolic Panel [[id number]]  (Abnormal) Collected: 04/11/23 2316    Specimen: Blood Updated: 04/12/23 0038     Glucose 318 mg/dL      BUN 10 mg/dL      Creatinine 0.58 mg/dL      Sodium 131 mmol/L      Potassium 3.5 mmol/L      Chloride 98 mmol/L      CO2 21.3 mmol/L      Calcium 9.0 mg/dL      Total Protein 7.0 g/dL      Albumin 3.8 g/dL      ALT (SGPT) 16 U/L      AST (SGOT) 11 U/L      Alkaline Phosphatase 88 U/L      Total Bilirubin <0.2 mg/dL      Globulin 3.2 gm/dL      A/G Ratio 1.2 g/dL      BUN/Creatinine Ratio 17.2     Anion Gap 11.7 mmol/L      eGFR 125.8 mL/min/1.73     Narrative:      GFR Normal >60  Chronic Kidney Disease <60  Kidney Failure <15      Lipase [[id number]]  (Normal) Collected: 04/11/23 2316    Specimen: Blood Updated: 04/12/23 0022     Lipase 36 U/L     BNP [[id number]]  (Normal) Collected: 04/11/23 2316    Specimen: Blood Updated: 04/12/23 0016     proBNP 40.3 pg/mL      Narrative:      Among patients with dyspnea, NT-proBNP is highly sensitive for the detection of acute congestive heart failure. In addition NT-proBNP of <300 pg/ml effectively rules out acute congestive heart failure with 99% negative predictive value.      Magnesium [[id number]]  (Normal) Collected: 04/11/23 2316    Specimen: Blood Updated: 04/12/23 0022     Magnesium 1.8 mg/dL     CBC Auto Differential [[id number]]  (Abnormal) Collected: 04/11/23 2316    Specimen: Blood Updated: 04/12/23 0009     WBC 6.15 10*3/mm3      RBC 4.94 10*6/mm3      Hemoglobin 11.5 g/dL      Hematocrit 36.5 %      MCV 73.9 fL      MCH 23.3 pg      MCHC 31.5 g/dL      RDW 14.2 %      RDW-SD 37.6 fl      MPV 9.1 fL      Platelets 265 10*3/mm3      Neutrophil % 46.5 %      Lymphocyte % 45.7 %      Monocyte % 6.5 %      Eosinophil % 0.8 %      Basophil % 0.3 %      Immature Grans % 0.2 %      Neutrophils, Absolute 2.86 10*3/mm3      Lymphocytes, Absolute 2.81 10*3/mm3      Monocytes, Absolute 0.40 10*3/mm3      Eosinophils, Absolute 0.05 10*3/mm3      Basophils, Absolute 0.02 10*3/mm3      Immature Grans, Absolute 0.01 10*3/mm3      nRBC 0.0 /100 WBC     Urine Drug Screen - Urine, Clean Catch [[id number]]  (Abnormal) Collected: 04/11/23 2318    Specimen: Urine, Clean Catch Updated: 04/12/23 0020     Amphet/Methamphet, Screen Positive     Barbiturates Screen, Urine Negative     Benzodiazepine Screen, Urine Negative     Cocaine Screen, Urine Negative     Opiate Screen Negative     THC, Screen, Urine Positive     Methadone Screen, Urine Negative     Oxycodone Screen, Urine Negative    Narrative:      Negative Thresholds Per Drugs Screened:    Amphetamines                 500 ng/ml  Barbiturates                 200 ng/ml  Benzodiazepines              100 ng/ml  Cocaine                      300 ng/ml  Methadone                    300 ng/ml  Opiates                      300 ng/ml  Oxycodone                    100 ng/ml  THC                            50 ng/ml    The Normal Value for all drugs tested is negative. This report includes final unconfirmed screening results to be used for medical treatment purposes only. Unconfirmed results must not be used for non-medical purposes such as employment or legal testing. Clinical consideration should be applied to any drug of abuse test, particularly when unconfirmed results are used.            POC Glucose Once [[id number]]  (Abnormal) Collected: 04/12/23 0033    Specimen: Blood Updated: 04/12/23 0035     Glucose 269 mg/dL      Comment: Serial Number: [id number]:  [id number]              Imaging:    XR Chest 1 View    Result Date: 4/11/2023  PROCEDURE: XR CHEST 1 VW  COMPARISON: 8/9/2021.  INDICATIONS: CHEST PAIN.  FINDINGS:  A single AP (or PA) upright portable chest radiograph was performed.  No cardiac enlargement is seen.  No acute infiltrate is appreciated.  No pleural effusion or pneumothorax is identified.  External artifacts are noted.  No significant interval change is seen since the prior study (or studies).        No acute infiltrate is appreciated.     Please note that portions of this note were completed with a voice recognition program.  ANABEL WONG JR, MD       Electronically Signed and Approved By: ANABEL WONG JR, MD on 4/11/2023 at 23:55                  Differential Diagnosis and Discussion:    Chest Pain:  Based on the patient's signs and symptoms, I considered aortic dissection, myocardial infaction, pulmonary embolism, cardiac tamponade, pericarditis, pneumothorax, musculoskeletal chest pain and other differential diagnosis as an etiology of the patient's chest pain.     All labs were reviewed and interpreted by me.  All X-rays were independently reviewed by me.  EKG was interpreted by me.    [facility]         Patient Care Considerations:    PERC: I used the PERC score to risk stratify the patient for PE and a CT of the chest was considered but ultimately not indicated in today's  visit.      Consultants/Shared Management Plan:    None    Social Determinants of Health:    Patient is independent, reliable, and has access to care.       Disposition and Care Coordination:    Discharged: I considered escalation of care by admitting this patient for observation, however the patient has improved and is suitable and  stable for discharge.    I have explained the patient´s condition, diagnoses and treatment plan based on the information available to me at this time. I have answered questions and addressed any concerns. The patient has a good  understanding of the patient´s diagnosis, condition, and treatment plan as can be expected at this point. The vital signs have been stable. The patient´s condition is stable and appropriate for discharge from the emergency department.      The patient will pursue further outpatient evaluation with the primary care physician or other designated or consulting physician as outlined in the discharge instructions. They are agreeable to this plan of care and follow-up instructions have been explained in detail. The patient has received these instructions in written format and have expressed an understanding of the discharge instructions. The patient is aware that any significant change in condition or worsening of symptoms should prompt an immediate return to this or the closest emergency department or call to 911.  I have explained discharge medications and the need for follow up with the patient/caretakers. This was also printed in the discharge instructions. Patient was discharged with the following medications and follow up:      Medication List      No changes were made to your prescriptions during this visit.      Provider, No Known  [facility] [place] [facility]    Schedule an appointment as soon as possible for a visit          Final diagnoses:   Chest pain, unspecified type   Hyperglycemia        ED Disposition     ED Disposition   Discharge     Condition   Stable    Comment   --             This medical record created using voice recognition software.           Theodore Rich MD  04/12/23 0770

## 2023-04-16 ENCOUNTER — APPOINTMENT (OUTPATIENT)
Dept: GENERAL RADIOLOGY | Facility: HOSPITAL | Age: 28
End: 2023-04-16
Payer: MEDICAID

## 2023-04-16 ENCOUNTER — HOSPITAL ENCOUNTER (EMERGENCY)
Facility: HOSPITAL | Age: 28
Discharge: HOME | End: 2023-04-16
Attending: STUDENT IN AN ORGANIZED HEALTH CARE EDUCATION/TRAINING PROGRAM | Admitting: STUDENT IN AN ORGANIZED HEALTH CARE EDUCATION/TRAINING PROGRAM
Payer: MEDICAID

## 2023-04-16 VITALS
SYSTOLIC BLOOD PRESSURE: 134 MMHG | HEIGHT: 64 IN | TEMPERATURE: 98.6 F | BODY MASS INDEX: 29.13 KG/M2 | DIASTOLIC BLOOD PRESSURE: 74 MMHG | OXYGEN SATURATION: 100 % | HEART RATE: 90 BPM | WEIGHT: 170.64 LBS | RESPIRATION RATE: 20 BRPM

## 2023-04-16 DIAGNOSIS — R07.9 CHEST PAIN WITH NORMAL EKG: Primary | ICD-10-CM

## 2023-04-16 DIAGNOSIS — F14.90 COCAINE USE: ICD-10-CM

## 2023-04-16 LAB — QT INTERVAL: 397 MS

## 2023-04-16 PROCEDURE — 71045 X-RAY EXAM CHEST 1 VIEW: CPT

## 2023-04-16 PROCEDURE — 93005 ELECTROCARDIOGRAM TRACING: CPT

## 2023-04-16 PROCEDURE — 99283 EMERGENCY DEPT VISIT LOW MDM: CPT

## 2023-04-16 RX ORDER — IBUPROFEN 400 MG/1
800 TABLET ORAL ONCE
Status: COMPLETED | OUTPATIENT
Start: 2023-04-16 | End: 2023-04-16

## 2023-04-16 RX ORDER — NITROGLYCERIN 0.4 MG/1
0.4 TABLET SUBLINGUAL
Status: DISCONTINUED | OUTPATIENT
Start: 2023-04-16 | End: 2023-04-16

## 2023-04-16 RX ADMIN — IBUPROFEN 800 MG: 400 TABLET, FILM COATED ORAL at 16:27

## 2023-04-16 NOTE — ED PROVIDER NOTES
Time: 3:48 PM EDT  Date of encounter:  2023  Independent Historian/Clinical History and Information was obtained by:   Patient  Chief Complaint: CP    History is limited by: N/A    History of Present Illness:  Patient is a 27 y.o. year old female who presents to the emergency department for evaluation of chest pain.  Pt reports having chest pain 2 days ago.  Patient was seen and worked up for chest pain 4 days ago here in the ED. She states that after she was discharged she could not get meth so she decided to do cocaine which is not her normal drug of choice. She states she did cocaine yesterday.  Patient states that the chest pain started then but progressively worsened.  She denies fever, chills, nausea or vomiting.       HPI    Patient Care Team  Primary Care Provider: Provider, No Known    Past Medical History:     Allergies   Allergen Reactions   • Morphine Anaphylaxis   • Peanut Oil Anaphylaxis   • Rocephin [Ceftriaxone] Nausea And Vomiting   • Sulfa Antibiotics Anaphylaxis   • Topiramate Anaphylaxis   • Iodine Other (See Comments)   • Latex Hives   • Shellfish-Derived Products Rash     Past Medical History:   Diagnosis Date   • Borderline personality disorder    • Diabetes mellitus    • Endocarditis    • Endocarditis    • Hepatitis C    • Mood disorder    • PTSD (post-traumatic stress disorder)    • Seizures      Past Surgical History:   Procedure Laterality Date   • BACK SURGERY     •  SECTION       History reviewed. No pertinent family history.    Home Medications:  Prior to Admission medications    Medication Sig Start Date End Date Taking? Authorizing Provider   cephalexin (KEFLEX) 500 MG capsule Take 1 capsule by mouth 2 (Two) Times a Day. 21   Eyal Milan DO   escitalopram (LEXAPRO) 10 MG tablet Take 1 tablet by mouth Daily. Indications: Major Depressive Disorder 21   Gutierrez Palma MD   hydrOXYzine pamoate (VISTARIL) 50 MG capsule Take 1 capsule by mouth 2 (Two) Times a Day As  "Needed for Anxiety. Indications: Feeling Anxious 8/16/21   Gutierrez Palma MD   levETIRAcetam (KEPPRA) 500 MG tablet Take 1 tablet by mouth Every 12 (Twelve) Hours. 6/16/21   Bob Bruce, DO   naproxen (EC NAPROSYN) 500 MG EC tablet Take 1 tablet by mouth 2 (Two) Times a Day With Meals. 8/17/21   Eyal Milan, DO   omeprazole (PrilOSEC) 20 MG capsule Take 1 capsule by mouth Daily. 6/16/21   Bob Bruce, DO   ondansetron ODT (ZOFRAN-ODT) 4 MG disintegrating tablet Place 1 tablet on the tongue Every 8 (Eight) Hours As Needed for Nausea. 8/17/21   Eyal Milan, DO   traZODone (DESYREL) 100 MG tablet Take 1 tablet by mouth At Night As Needed for Sleep. Indications: Trouble Sleeping 8/16/21   Gutierrez Palma MD   valACYclovir (VALTREX) 1000 MG tablet Take 1 tablet by mouth Daily. Indications: Infection of the Skin and/or Soft Tissue 8/16/21   Gutierrez Palma MD        Social History:   Social History     Tobacco Use   • Smoking status: Never   • Smokeless tobacco: Never   Vaping Use   • Vaping Use: Never used   Substance Use Topics   • Alcohol use: Never   • Drug use: Not Currently     Types: IV, Methamphetamines, Marijuana         Review of Systems:  Review of Systems   Constitutional: Negative.    HENT: Negative.    Eyes: Negative.    Respiratory: Positive for chest tightness and shortness of breath.    Cardiovascular: Positive for chest pain.   Gastrointestinal: Negative.    Endocrine: Negative.    Genitourinary: Negative.    Musculoskeletal: Negative.    Skin: Negative.    Allergic/Immunologic: Negative.    Neurological: Negative.    Hematological: Negative.    Psychiatric/Behavioral: Negative.         Physical Exam:  /74   Pulse 90   Temp 98.6 °F (37 °C)   Resp 20   Ht 162.6 cm (64\")   Wt 77.4 kg (170 lb 10.2 oz)   LMP 04/02/2023 (Approximate)   SpO2 100%   BMI 29.29 kg/m²     Physical Exam  Vitals and nursing note reviewed.   Constitutional:       Appearance: Normal appearance. She is normal weight. "   HENT:      Head: Normocephalic and atraumatic.      Nose: Nose normal.      Mouth/Throat:      Mouth: Mucous membranes are moist.   Eyes:      Extraocular Movements: Extraocular movements intact.      Conjunctiva/sclera: Conjunctivae normal.      Pupils: Pupils are equal, round, and reactive to light.   Cardiovascular:      Rate and Rhythm: Normal rate and regular rhythm.      Heart sounds: Normal heart sounds.   Pulmonary:      Effort: Pulmonary effort is normal.      Breath sounds: Normal breath sounds.   Abdominal:      General: Abdomen is flat.   Musculoskeletal:         General: Normal range of motion.      Cervical back: Normal range of motion and neck supple.   Skin:     General: Skin is warm and dry.   Neurological:      General: No focal deficit present.      Mental Status: She is alert and oriented to person, place, and time.   Psychiatric:         Mood and Affect: Mood normal.         Behavior: Behavior normal.                  Procedures:  Procedures      Medical Decision Making:      Comorbidities that affect care:    Diabetes, Substance Abuse    External Notes reviewed:    Previous ED Note: stating patient was here on 4/12/23 due to chest pain and had a full work-up.      The following orders were placed and all results were independently analyzed by me:  Orders Placed This Encounter   Procedures   • XR Chest 1 View   • ECG 12 Lead Other; cp after cocaine       Medications Given in the Emergency Department:  Medications   ibuprofen (ADVIL,MOTRIN) tablet 800 mg (800 mg Oral Given 4/16/23 1627)        ED Course:    ED Course as of 04/16/23 1629   Sun Apr 16, 2023   1608 I asked the patient if she knew she can get chest pain after cocaine use she stated now she did not know that. [AJ]      ED Course User Index  [AJ] Edvin Dolan PA-C       Labs:    Lab Results (last 24 hours)     ** No results found for the last 24 hours. **           Imaging:    XR Chest 1 View    Result Date:  4/16/2023  PROCEDURE: XR CHEST 1 VW  COMPARISON: [facility], CR, XR CHEST 1 VW, 4/11/2023, 23:08.  INDICATIONS: cough  FINDINGS:  LUNGS: Normal.  No significant pulmonary parenchymal abnormalities.  VASCULATURE: Normal.  Unremarkable pulmonary vasculature.  CARDIAC: Normal.  No cardiac silhouette abnormality or cardiomegaly.  MEDIASTINUM: Normal.  No visible mass or adenopathy.  PLEURA: Normal.  No effusion or pleural thickening.  BONES: Normal.  No fracture or visible bony lesion.  OTHER: Negative.         1. No acute cardiopulmonary disease       Piero Michael M.D.       Electronically Signed and Approved By: Piero Michael M.D. on 4/16/2023 at 16:24                 Differential Diagnosis and Discussion:    Chest Pain:  Based on the patient's signs and symptoms, I considered aortic dissection, myocardial infaction, pulmonary embolism, cardiac tamponade, pericarditis, pneumothorax, musculoskeletal chest pain and other differential diagnosis as an etiology of the patient's chest pain.     All labs were reviewed and interpreted by me.    [facility]    Patient Care Considerations:    LABS: I considered ordering labs, however patient was here 4 days ago for same complaint and then did cocaine after discharge       Consultants/Shared Management Plan:    None    Social Determinants of Health:    Patient is independent, reliable, and has access to care.       Disposition and Care Coordination:    Discharged: The patient is suitable and stable for discharge with no need for consideration of observation or admission.    I have explained discharge medications and the need for follow up with the patient/caretakers. This was also printed in the discharge instructions. Patient was discharged with the following medications and follow up:      Medication List      No changes were made to your prescriptions during this visit.      No follow-up provider specified.     Final diagnoses:   Chest pain with normal EKG   Cocaine  use        ED Disposition     ED Disposition   Discharge    Condition   Stable    Comment   --             This medical record created using voice recognition software.           Isela Rivera  04/16/23 1558       Bradley Parks  04/16/23 1600       Edvin Dolan PA-C  04/16/23 9992

## 2023-04-16 NOTE — DISCHARGE INSTRUCTIONS
Your EKG was unchanged from your visit 2 days ago.  Your chest x-ray was normal  Cocaine use can cause chest pain

## 2023-04-17 ENCOUNTER — HOSPITAL ENCOUNTER (EMERGENCY)
Facility: HOSPITAL | Age: 28
Discharge: HOME | End: 2023-04-17
Attending: EMERGENCY MEDICINE | Admitting: EMERGENCY MEDICINE
Payer: MEDICAID

## 2023-04-17 VITALS
WEIGHT: 177.47 LBS | OXYGEN SATURATION: 100 % | TEMPERATURE: 98.1 F | RESPIRATION RATE: 18 BRPM | HEIGHT: 63 IN | SYSTOLIC BLOOD PRESSURE: 130 MMHG | HEART RATE: 94 BPM | DIASTOLIC BLOOD PRESSURE: 71 MMHG | BODY MASS INDEX: 31.45 KG/M2

## 2023-04-17 VITALS
HEART RATE: 89 BPM | OXYGEN SATURATION: 100 % | HEIGHT: 63 IN | SYSTOLIC BLOOD PRESSURE: 121 MMHG | RESPIRATION RATE: 16 BRPM | TEMPERATURE: 97.5 F | DIASTOLIC BLOOD PRESSURE: 77 MMHG | BODY MASS INDEX: 31.33 KG/M2 | WEIGHT: 176.81 LBS

## 2023-04-17 DIAGNOSIS — F19.10 SUBSTANCE ABUSE: Primary | ICD-10-CM

## 2023-04-17 LAB
ALBUMIN SERPL-MCNC: 4 G/DL (ref 3.5–5.2)
ALBUMIN/GLOB SERPL: 1.1 G/DL
ALP SERPL-CCNC: 90 U/L (ref 39–117)
ALT SERPL W P-5'-P-CCNC: 19 U/L (ref 1–33)
AMPHET+METHAMPHET UR QL: POSITIVE
ANION GAP SERPL CALCULATED.3IONS-SCNC: 13.4 MMOL/L (ref 5–15)
APAP SERPL-MCNC: <5 MCG/ML (ref 0–30)
AST SERPL-CCNC: 17 U/L (ref 1–32)
BARBITURATES UR QL SCN: NEGATIVE
BASOPHILS # BLD AUTO: 0.02 10*3/MM3 (ref 0–0.2)
BASOPHILS NFR BLD AUTO: 0.4 % (ref 0–1.5)
BENZODIAZ UR QL SCN: NEGATIVE
BILIRUB SERPL-MCNC: 0.4 MG/DL (ref 0–1.2)
BUN SERPL-MCNC: 12 MG/DL (ref 6–20)
BUN/CREAT SERPL: 17.4 (ref 7–25)
CALCIUM SPEC-SCNC: 8.9 MG/DL (ref 8.6–10.5)
CANNABINOIDS SERPL QL: NEGATIVE
CHLORIDE SERPL-SCNC: 98 MMOL/L (ref 98–107)
CO2 SERPL-SCNC: 21.6 MMOL/L (ref 22–29)
COCAINE UR QL: NEGATIVE
CREAT SERPL-MCNC: 0.69 MG/DL (ref 0.57–1)
DEPRECATED RDW RBC AUTO: 38.2 FL (ref 37–54)
EGFRCR SERPLBLD CKD-EPI 2021: 120.7 ML/MIN/1.73
EOSINOPHIL # BLD AUTO: 0.07 10*3/MM3 (ref 0–0.4)
EOSINOPHIL NFR BLD AUTO: 1.3 % (ref 0.3–6.2)
ERYTHROCYTE [DISTWIDTH] IN BLOOD BY AUTOMATED COUNT: 14.5 % (ref 12.3–15.4)
ETHANOL BLD-MCNC: <10 MG/DL (ref 0–10)
ETHANOL UR QL: <0.01 %
GLOBULIN UR ELPH-MCNC: 3.5 GM/DL
GLUCOSE SERPL-MCNC: 315 MG/DL (ref 65–99)
HCT VFR BLD AUTO: 36.5 % (ref 34–46.6)
HGB BLD-MCNC: 11.8 G/DL (ref 12–15.9)
HOLD SPECIMEN: NORMAL
HOLD SPECIMEN: NORMAL
IMM GRANULOCYTES # BLD AUTO: 0.01 10*3/MM3 (ref 0–0.05)
IMM GRANULOCYTES NFR BLD AUTO: 0.2 % (ref 0–0.5)
LYMPHOCYTES # BLD AUTO: 2.57 10*3/MM3 (ref 0.7–3.1)
LYMPHOCYTES NFR BLD AUTO: 45.9 % (ref 19.6–45.3)
MCH RBC QN AUTO: 24.2 PG (ref 26.6–33)
MCHC RBC AUTO-ENTMCNC: 32.3 G/DL (ref 31.5–35.7)
MCV RBC AUTO: 74.8 FL (ref 79–97)
METHADONE UR QL SCN: NEGATIVE
MONOCYTES # BLD AUTO: 0.3 10*3/MM3 (ref 0.1–0.9)
MONOCYTES NFR BLD AUTO: 5.4 % (ref 5–12)
NEUTROPHILS NFR BLD AUTO: 2.63 10*3/MM3 (ref 1.7–7)
NEUTROPHILS NFR BLD AUTO: 46.8 % (ref 42.7–76)
NRBC BLD AUTO-RTO: 0 /100 WBC (ref 0–0.2)
OPIATES UR QL: NEGATIVE
OXYCODONE UR QL SCN: NEGATIVE
PLATELET # BLD AUTO: 255 10*3/MM3 (ref 140–450)
PMV BLD AUTO: 8.8 FL (ref 6–12)
POTASSIUM SERPL-SCNC: 3.7 MMOL/L (ref 3.5–5.2)
PROT SERPL-MCNC: 7.5 G/DL (ref 6–8.5)
RBC # BLD AUTO: 4.88 10*6/MM3 (ref 3.77–5.28)
SALICYLATES SERPL-MCNC: <0.3 MG/DL
SODIUM SERPL-SCNC: 133 MMOL/L (ref 136–145)
WBC NRBC COR # BLD: 5.6 10*3/MM3 (ref 3.4–10.8)
WHOLE BLOOD HOLD COAG: NORMAL
WHOLE BLOOD HOLD SPECIMEN: NORMAL

## 2023-04-17 PROCEDURE — 80053 COMPREHEN METABOLIC PANEL: CPT | Performed by: EMERGENCY MEDICINE

## 2023-04-17 PROCEDURE — 80307 DRUG TEST PRSMV CHEM ANLYZR: CPT

## 2023-04-17 PROCEDURE — 85025 COMPLETE CBC W/AUTO DIFF WBC: CPT | Performed by: EMERGENCY MEDICINE

## 2023-04-17 PROCEDURE — 82077 ASSAY SPEC XCP UR&BREATH IA: CPT | Performed by: EMERGENCY MEDICINE

## 2023-04-17 PROCEDURE — 36415 COLL VENOUS BLD VENIPUNCTURE: CPT

## 2023-04-17 PROCEDURE — 99211 OFF/OP EST MAY X REQ PHY/QHP: CPT

## 2023-04-17 PROCEDURE — 80143 DRUG ASSAY ACETAMINOPHEN: CPT | Performed by: EMERGENCY MEDICINE

## 2023-04-17 PROCEDURE — 80179 DRUG ASSAY SALICYLATE: CPT | Performed by: EMERGENCY MEDICINE

## 2023-04-17 PROCEDURE — 99283 EMERGENCY DEPT VISIT LOW MDM: CPT

## 2023-04-17 PROCEDURE — 99284 EMERGENCY DEPT VISIT MOD MDM: CPT

## 2023-04-17 RX ORDER — SODIUM CHLORIDE 0.9 % (FLUSH) 0.9 %
10 SYRINGE (ML) INJECTION AS NEEDED
Status: DISCONTINUED | OUTPATIENT
Start: 2023-04-17 | End: 2023-04-17 | Stop reason: HOSPADM

## 2023-04-17 NOTE — ED PROVIDER NOTES
Time: 5:06 PM EDT  Date of encounter:  2023  Independent Historian/Clinical History and Information was obtained by:   Patient  Chief Complaint: substance abuse    History is limited by: N/A    History of Present Illness:  Patient is a 27 y.o. year old female who presents to the emergency department for evaluation of substance abuse.    Pt is homeless.     Pt is wanting to detox from drugs. She notes she uses Methamphetamine and Heroin.     The patient denies being suicidal and states that she initially told evaluators that she was suicidal so she could get help          Patient Care Team  Primary Care Provider: Provider, No Known    Past Medical History:     Allergies   Allergen Reactions   • Morphine Anaphylaxis   • Peanut Oil Anaphylaxis   • Rocephin [Ceftriaxone] Nausea And Vomiting   • Sulfa Antibiotics Anaphylaxis   • Topiramate Anaphylaxis   • Iodine Other (See Comments)   • Latex Hives   • Shellfish-Derived Products Rash     Past Medical History:   Diagnosis Date   • Borderline personality disorder    • Diabetes mellitus    • Endocarditis    • Endocarditis    • Hepatitis C    • Mood disorder    • PTSD (post-traumatic stress disorder)    • Seizures      Past Surgical History:   Procedure Laterality Date   • BACK SURGERY     •  SECTION       No family history on file.    Home Medications:  Prior to Admission medications    Medication Sig Start Date End Date Taking? Authorizing Provider   cephalexin (KEFLEX) 500 MG capsule Take 1 capsule by mouth 2 (Two) Times a Day. 21   Eyal Milan DO   escitalopram (LEXAPRO) 10 MG tablet Take 1 tablet by mouth Daily. Indications: Major Depressive Disorder 21   Gutierrez Palma MD   hydrOXYzine pamoate (VISTARIL) 50 MG capsule Take 1 capsule by mouth 2 (Two) Times a Day As Needed for Anxiety. Indications: Feeling Anxious 21   Gutierrez Palma MD   levETIRAcetam (KEPPRA) 500 MG tablet Take 1 tablet by mouth Every 12 (Twelve) Hours. 21   Bob Bruce  AUDREY, DO   naproxen (EC NAPROSYN) 500 MG EC tablet Take 1 tablet by mouth 2 (Two) Times a Day With Meals. 8/17/21   Eyal Milan, DO   omeprazole (PrilOSEC) 20 MG capsule Take 1 capsule by mouth Daily. 6/16/21   [place], DO   ondansetron ODT (ZOFRAN-ODT) 4 MG disintegrating tablet Place 1 tablet on the tongue Every 8 (Eight) Hours As Needed for Nausea. 8/17/21   Eyal Milan, DO   traZODone (DESYREL) 100 MG tablet Take 1 tablet by mouth At Night As Needed for Sleep. Indications: Trouble Sleeping 8/16/21   Gutierrez Palma MD   valACYclovir (VALTREX) 1000 MG tablet Take 1 tablet by mouth Daily. Indications: Infection of the Skin and/or Soft Tissue 8/16/21   Gutierrez Palma MD        Social History:   Social History     Tobacco Use   • Smoking status: Never   • Smokeless tobacco: Never   Vaping Use   • Vaping Use: Never used   Substance Use Topics   • Alcohol use: Never   • Drug use: Not Currently     Types: IV, Methamphetamines, Marijuana         Review of Systems:  Review of Systems   Constitutional: Negative for activity change, chills, fatigue and unexpected weight change.   HENT: Negative for congestion, sinus pressure, sore throat and trouble swallowing.    Eyes: Negative for pain, discharge, redness and visual disturbance.   Respiratory: Negative for cough, chest tightness, shortness of breath and wheezing.    Cardiovascular: Negative for chest pain and palpitations.   Gastrointestinal: Negative for abdominal pain, diarrhea, nausea and vomiting.   Endocrine: Negative for cold intolerance and polydipsia.   Genitourinary: Negative for dysuria, frequency, hematuria and urgency.   Musculoskeletal: Negative for arthralgias, joint swelling, neck pain and neck stiffness.   Skin: Negative for color change and rash.   Allergic/Immunologic: Negative for environmental allergies and immunocompromised state.   Neurological: Negative for dizziness, weakness and light-headedness.   Hematological: Does not bruise/bleed easily.  "  Psychiatric/Behavioral: Positive for dysphoric mood. Negative for agitation, confusion and suicidal ideas.        Physical Exam:  /71 (BP Location: Left arm, Patient Position: Sitting)   Pulse 94   Temp 98.1 °F (36.7 °C) (Oral)   Resp 18   Ht 160 cm (63\")   Wt 80.5 kg (177 lb 7.5 oz)   LMP 04/02/2023 (Approximate)   SpO2 100%   BMI 31.44 kg/m²     Physical Exam  Vitals and nursing note reviewed.   Constitutional:       General: She is not in acute distress.     Appearance: Normal appearance. She is not toxic-appearing.   HENT:      Head: Normocephalic and atraumatic.      Jaw: There is normal jaw occlusion.   Eyes:      General: Lids are normal.      Extraocular Movements: Extraocular movements intact.      Conjunctiva/sclera: Conjunctivae normal.      Pupils: Pupils are equal, round, and reactive to light.   Cardiovascular:      Rate and Rhythm: Normal rate and regular rhythm.      Pulses: Normal pulses.      Heart sounds: Normal heart sounds.   Pulmonary:      Effort: Pulmonary effort is normal. No respiratory distress.      Breath sounds: Normal breath sounds. No wheezing or rhonchi.   Abdominal:      General: Abdomen is flat.      Palpations: Abdomen is soft.      Tenderness: There is no abdominal tenderness. There is no guarding or rebound.   Musculoskeletal:         General: Normal range of motion.      Cervical back: Normal range of motion and neck supple.      Right lower leg: No edema.      Left lower leg: No edema.   Skin:     General: Skin is warm and dry.   Neurological:      Mental Status: She is alert and oriented to person, place, and time. Mental status is at baseline.   Psychiatric:         Mood and Affect: Mood normal.         Behavior: Behavior normal.         Thought Content: Thought content normal. Thought content does not include suicidal ideation.         Judgment: Judgment normal.      Comments: The patient denies being suicidal or homicidal            "       Procedures:  Procedures      Medical Decision Making:      Comorbidities that affect care:    Diabetes, Substance Abuse    External Notes reviewed:    Previous ED Note: Pt frequents the ED. She was here yesterday for chest pain.       The following orders were placed and all results were independently analyzed by me:  Orders Placed This Encounter   Procedures   • [place] Draw   • Comprehensive Metabolic Panel   • Acetaminophen Level   • Ethanol   • Salicylate Level   • Urine Drug Screen - Urine, Clean Catch   • CBC Auto Differential   • Undress & Gown   • CBC & Differential   • Green Top (Gel)   • Lavender Top   • Gold Top - SST   • Light Blue Top       Medications Given in the Emergency Department:  Medications - No data to display     ED Course:         Labs:    Results for orders placed or performed during the hospital encounter of 04/17/23   Comprehensive Metabolic Panel    Specimen: Blood   Result Value Ref Range    Glucose 315 (H) 65 - 99 mg/dL    BUN 12 6 - 20 mg/dL    Creatinine 0.69 0.57 - 1.00 mg/dL    Sodium 133 (L) 136 - 145 mmol/L    Potassium 3.7 3.5 - 5.2 mmol/L    Chloride 98 98 - 107 mmol/L    CO2 21.6 (L) 22.0 - 29.0 mmol/L    Calcium 8.9 8.6 - 10.5 mg/dL    Total Protein 7.5 6.0 - 8.5 g/dL    Albumin 4.0 3.5 - 5.2 g/dL    ALT (SGPT) 19 1 - 33 U/L    AST (SGOT) 17 1 - 32 U/L    Alkaline Phosphatase 90 39 - 117 U/L    Total Bilirubin 0.4 0.0 - 1.2 mg/dL    Globulin 3.5 gm/dL    A/G Ratio 1.1 g/dL    BUN/Creatinine Ratio 17.4 7.0 - 25.0    Anion Gap 13.4 5.0 - 15.0 mmol/L    eGFR 120.7 >60.0 mL/min/1.73   Acetaminophen Level    Specimen: Blood   Result Value Ref Range    Acetaminophen <5.0 0.0 - 30.0 mcg/mL   Ethanol    Specimen: Blood   Result Value Ref Range    Ethanol <10 0 - 10 mg/dL    Ethanol % <0.010 %   Salicylate Level    Specimen: Blood   Result Value Ref Range    Salicylate <0.3 <=30.0 mg/dL   Urine Drug Screen - Urine, Clean Catch    Specimen: Urine, Clean Catch   Result Value Ref  Range    Amphet/Methamphet, Screen Positive (A) Negative    Barbiturates Screen, Urine Negative Negative    Benzodiazepine Screen, Urine Negative Negative    Cocaine Screen, Urine Negative Negative    Opiate Screen Negative Negative    THC, Screen, Urine Negative Negative    Methadone Screen, Urine Negative Negative    Oxycodone Screen, Urine Negative Negative   CBC Auto Differential    Specimen: Blood   Result Value Ref Range    WBC 5.60 3.40 - 10.80 10*3/mm3    RBC 4.88 3.77 - 5.28 10*6/mm3    Hemoglobin 11.8 (L) 12.0 - 15.9 g/dL    Hematocrit 36.5 34.0 - 46.6 %    MCV 74.8 (L) 79.0 - 97.0 fL    MCH 24.2 (L) 26.6 - 33.0 pg    MCHC 32.3 31.5 - 35.7 g/dL    RDW 14.5 12.3 - 15.4 %    RDW-SD 38.2 37.0 - 54.0 fl    MPV 8.8 6.0 - 12.0 fL    Platelets 255 140 - 450 10*3/mm3    Neutrophil % 46.8 42.7 - 76.0 %    Lymphocyte % 45.9 (H) 19.6 - 45.3 %    Monocyte % 5.4 5.0 - 12.0 %    Eosinophil % 1.3 0.3 - 6.2 %    Basophil % 0.4 0.0 - 1.5 %    Immature Grans % 0.2 0.0 - 0.5 %    Neutrophils, Absolute 2.63 1.70 - 7.00 10*3/mm3    Lymphocytes, Absolute 2.57 0.70 - 3.10 10*3/mm3    Monocytes, Absolute 0.30 0.10 - 0.90 10*3/mm3    Eosinophils, Absolute 0.07 0.00 - 0.40 10*3/mm3    Basophils, Absolute 0.02 0.00 - 0.20 10*3/mm3    Immature Grans, Absolute 0.01 0.00 - 0.05 10*3/mm3    nRBC 0.0 0.0 - 0.2 /100 WBC   Green Top (Gel)   Result Value Ref Range    Extra Tube Hold for add-ons.    Lavender Top   Result Value Ref Range    Extra Tube hold for add-on    Gold Top - SST   Result Value Ref Range    Extra Tube Hold for add-ons.    Light Blue Top   Result Value Ref Range    Extra Tube Hold for add-ons.            Labs Reviewed   COMPREHENSIVE METABOLIC PANEL - Abnormal; Notable for the following components:       Result Value    Glucose 315 (*)     Sodium 133 (*)     CO2 21.6 (*)     All other components within normal limits    Narrative:     GFR Normal >60  Chronic Kidney Disease <60  Kidney Failure <15     URINE DRUG SCREEN -  Abnormal; Notable for the following components:    Amphet/Methamphet, Screen Positive (*)     All other components within normal limits    Narrative:     Negative Thresholds Per Drugs Screened:    Amphetamines                 500 ng/ml  Barbiturates                 200 ng/ml  Benzodiazepines              100 ng/ml  Cocaine                      300 ng/ml  Methadone                    300 ng/ml  Opiates                      300 ng/ml  Oxycodone                    100 ng/ml  THC                           50 ng/ml    The Normal Value for all drugs tested is negative. This report includes final unconfirmed screening results to be used for medical treatment purposes only. Unconfirmed results must not be used for non-medical purposes such as employment or legal testing. Clinical consideration should be applied to any drug of abuse test, particularly when unconfirmed results are used.           CBC WITH AUTO DIFFERENTIAL - Abnormal; Notable for the following components:    Hemoglobin 11.8 (*)     MCV 74.8 (*)     MCH 24.2 (*)     Lymphocyte % 45.9 (*)     All other components within normal limits   ACETAMINOPHEN LEVEL - Normal   SALICYLATE LEVEL - Normal   RAINBOW DRAW    Narrative:     The following orders were created for panel order [place] Draw.  Procedure                               Abnormality         Status                     ---------                               -----------         ------                     Green Top (Gel)[[id number]]                                  Final result               Lavender Top[[id number]]                                     Final result               Gold Top - SST[[id number]]                                   Final result               Light Blue Top[[id number]]                                   Final result                 Please view results for these tests on the individual orders.   ETHANOL    Narrative:     Ethanol (Plasma)  <10 Essentially Negative    Toxic Concentrations            mg/dL    Flushing, slowing of reflexes    Impaired visual activity         Depression of CNS              >100  Possible Coma                  >300      CBC AND DIFFERENTIAL    Narrative:     The following orders were created for panel order CBC & Differential.  Procedure                               Abnormality         Status                     ---------                               -----------         ------                     CBC Auto Differential[[id number]]        Abnormal            Final result                 Please view results for these tests on the individual orders.   GREEN TOP   LAVENDER TOP   GOLD TOP - SST   LIGHT BLUE TOP       Lab Results (last 24 hours)     ** No results found for the last 24 hours. **           Imaging:    No Radiology Exams Resulted Within Past 24 Hours      Differential Diagnosis and Discussion:    Substance Abuse: depression, anxiety, multisubstance abuse,     All labs were reviewed and interpreted by me.    [facility]       Patient Care Considerations:    CT HEAD: I considered ordering a noncontrast CT of the head, however the patient has no head injury or new neuro complaints      Consultants/Shared Management Plan:    None    Social Determinants of Health:    Patient is homeless. Nursing staff was instructed to give patient adequate resources to care access.       Disposition and Care Coordination:    Discharged: The patient is suitable and stable for discharge with no need for consideration of observation or admission.    I have explained discharge medications and the need for follow up with the patient/caretakers. This was also printed in the discharge instructions. Patient was discharged with the following medications and follow up:      Medication List      No changes were made to your prescriptions during this visit.      [employer]  [facility]– [place][note 2] [address] [place] [address]  [phone]  Today         Final diagnoses:   Substance abuse         ED Disposition     ED Disposition   Discharge    Condition   Stable    Comment   --             This medical record created using voice recognition software.      Documentation assistance provided by Johny Arzate acting as scribe for Demetrio Haile DO. Information recorded by the scribe was done at my direction and has been verified and validated by me.        Johny Arzate  04/17/23 3160       Demetrio Haile DO  04/19/23 101

## 2023-04-17 NOTE — SIGNIFICANT NOTE
04/17/23 1742   Plan   Final Note [facility] contacted [facility] this date and they do have female beds available. SW student attempted to meet with pt at bedside to assist with , however, pt eloped this date.

## 2023-04-17 NOTE — ED NOTES
Unable to locate patient. Pt belongings were returned from security. Pt's paper scrubs were found in a near by trash can.

## 2023-04-17 NOTE — ED NOTES
"Pt stated that she wanted to leave. This nurse advised patient that because she voiced suicidal ideations and thoughts that I could not let her leave at this time. Pt replied, \"I'm not actually suicidal, I just told you that so I could get into detox and get help, but this is taking too long.\" Advised that I would speak to the provider to see what could be done to assist. MD advised of situation and stated that he would be in shortly to speak with the patient.   "

## 2023-04-17 NOTE — SIGNIFICANT NOTE
04/17/23 1735   Behavior WDL   Behavior WDL interactions;motor movement;X   Interactions suspicious   Motor Movement restless;pacing   Emotion Mood WDL   Emotion/Mood/Affect WDL affect;emotion mood;X   Affect blunted   Emotion/Mood irritable   Speech WDL   Speech WDL WDL   Perceptual State WDL   Perceptual State WDL hallucinations;WDL   Hallucinations denies hallucinations;other (see comments)  (Denied hallucinations, however, reports having them prior and that she was so crazy she drove the voices away)   Thought Process WDL   Thought Process WDL delusions;judgment and insight;thought content;X   Delusions no delusions   Judgment and Insight insight appropriate to situation;judgment appropriate to situation   Thought Content suspiciousness   Intellectual Performance WDL   Intellectual Performance WDL intellectual performance;level of consciousness;WDL   Intellectual Performance able to comprehend   Level of Consciousness Alert   Coping/Stress   Major Change/Loss/Stressor chemical dependency/abuse   Patient Personal Strengths self-reliant   Sources of Support unable to assess   Techniques to [place] with Loss/Stress/Change substance use   Reaction to Health Status adjusting   C-SSRS (Recent)   Q1 Wished to be Dead (Past Month) no   Q2 Suicidal Thoughts (Past Month) no   Q6 Suicide Behavior (Lifetime) no   Violence Risk   Feels Like Hurting Others no   Previous Attempt to Harm Others no     SW met with pt this date at the request of the provider. Pt reports that she is wanting rehab this date for use of meth and heroin. Pt reports that she cannot get either of those drugs here locally. Pt reports that she last used a few days ago. Pt was observed to be pacing the room and appeared restless. Pt denied SI/HI and current hallucinations. Pt reports that she would prefer [facility] this date. SW updated provider.

## 2023-04-18 ENCOUNTER — HOSPITAL ENCOUNTER (EMERGENCY)
Facility: HOSPITAL | Age: 28
Discharge: LEFT BEFORE DISCHARGE | End: 2023-04-18
Payer: MEDICAID

## 2023-04-18 DIAGNOSIS — Z53.21 ELOPED FROM EMERGENCY DEPARTMENT: Primary | ICD-10-CM

## 2023-04-19 ENCOUNTER — HOSPITAL ENCOUNTER (EMERGENCY)
Facility: HOSPITAL | Age: 28
Discharge: HOME | End: 2023-04-20
Attending: EMERGENCY MEDICINE
Payer: MEDICAID

## 2023-04-19 ENCOUNTER — APPOINTMENT (OUTPATIENT)
Dept: GENERAL RADIOLOGY | Facility: HOSPITAL | Age: 28
End: 2023-04-19
Payer: MEDICAID

## 2023-04-19 VITALS
WEIGHT: 176.15 LBS | HEIGHT: 63 IN | OXYGEN SATURATION: 100 % | HEART RATE: 92 BPM | BODY MASS INDEX: 31.21 KG/M2 | TEMPERATURE: 98.3 F | RESPIRATION RATE: 16 BRPM | DIASTOLIC BLOOD PRESSURE: 84 MMHG | SYSTOLIC BLOOD PRESSURE: 109 MMHG

## 2023-04-19 DIAGNOSIS — F19.11 HISTORY OF SUBSTANCE ABUSE: ICD-10-CM

## 2023-04-19 DIAGNOSIS — F41.9 ANXIETY: ICD-10-CM

## 2023-04-19 DIAGNOSIS — R45.851 DEPRESSION WITH SUICIDAL IDEATION: Primary | ICD-10-CM

## 2023-04-19 DIAGNOSIS — F32.A DEPRESSION WITH SUICIDAL IDEATION: Primary | ICD-10-CM

## 2023-04-19 DIAGNOSIS — R07.9 CHEST PAIN, UNSPECIFIED TYPE: ICD-10-CM

## 2023-04-19 LAB
ALBUMIN SERPL-MCNC: 3.9 G/DL (ref 3.5–5.2)
ALBUMIN/GLOB SERPL: 1.2 G/DL
ALP SERPL-CCNC: 83 U/L (ref 39–117)
ALT SERPL W P-5'-P-CCNC: 19 U/L (ref 1–33)
AMPHET+METHAMPHET UR QL: NEGATIVE
ANION GAP SERPL CALCULATED.3IONS-SCNC: 8.7 MMOL/L (ref 5–15)
APAP SERPL-MCNC: <5 MCG/ML (ref 0–30)
AST SERPL-CCNC: 15 U/L (ref 1–32)
BARBITURATES UR QL SCN: NEGATIVE
BASOPHILS # BLD AUTO: 0.01 10*3/MM3 (ref 0–0.2)
BASOPHILS NFR BLD AUTO: 0.2 % (ref 0–1.5)
BENZODIAZ UR QL SCN: NEGATIVE
BILIRUB SERPL-MCNC: 0.2 MG/DL (ref 0–1.2)
BUN SERPL-MCNC: 6 MG/DL (ref 6–20)
BUN/CREAT SERPL: 9.2 (ref 7–25)
CALCIUM SPEC-SCNC: 9.2 MG/DL (ref 8.6–10.5)
CANNABINOIDS SERPL QL: NEGATIVE
CHLORIDE SERPL-SCNC: 101 MMOL/L (ref 98–107)
CO2 SERPL-SCNC: 25.3 MMOL/L (ref 22–29)
COCAINE UR QL: NEGATIVE
CREAT SERPL-MCNC: 0.65 MG/DL (ref 0.57–1)
DEPRECATED RDW RBC AUTO: 37.9 FL (ref 37–54)
EGFRCR SERPLBLD CKD-EPI 2021: 122.4 ML/MIN/1.73
EOSINOPHIL # BLD AUTO: 0.07 10*3/MM3 (ref 0–0.4)
EOSINOPHIL NFR BLD AUTO: 1.1 % (ref 0.3–6.2)
ERYTHROCYTE [DISTWIDTH] IN BLOOD BY AUTOMATED COUNT: 14.3 % (ref 12.3–15.4)
ETHANOL BLD-MCNC: <10 MG/DL (ref 0–10)
ETHANOL UR QL: <0.01 %
GLOBULIN UR ELPH-MCNC: 3.2 GM/DL
GLUCOSE SERPL-MCNC: 147 MG/DL (ref 65–99)
HCG INTACT+B SERPL-ACNC: <0.5 MIU/ML
HCT VFR BLD AUTO: 37 % (ref 34–46.6)
HGB BLD-MCNC: 11.8 G/DL (ref 12–15.9)
HOLD SPECIMEN: NORMAL
HOLD SPECIMEN: NORMAL
IMM GRANULOCYTES # BLD AUTO: 0.01 10*3/MM3 (ref 0–0.05)
IMM GRANULOCYTES NFR BLD AUTO: 0.2 % (ref 0–0.5)
LYMPHOCYTES # BLD AUTO: 2.55 10*3/MM3 (ref 0.7–3.1)
LYMPHOCYTES NFR BLD AUTO: 41.7 % (ref 19.6–45.3)
MCH RBC QN AUTO: 24 PG (ref 26.6–33)
MCHC RBC AUTO-ENTMCNC: 31.9 G/DL (ref 31.5–35.7)
MCV RBC AUTO: 75.4 FL (ref 79–97)
METHADONE UR QL SCN: NEGATIVE
MONOCYTES # BLD AUTO: 0.46 10*3/MM3 (ref 0.1–0.9)
MONOCYTES NFR BLD AUTO: 7.5 % (ref 5–12)
NEUTROPHILS NFR BLD AUTO: 3.02 10*3/MM3 (ref 1.7–7)
NEUTROPHILS NFR BLD AUTO: 49.3 % (ref 42.7–76)
NRBC BLD AUTO-RTO: 0 /100 WBC (ref 0–0.2)
OPIATES UR QL: NEGATIVE
OXYCODONE UR QL SCN: NEGATIVE
PLATELET # BLD AUTO: 282 10*3/MM3 (ref 140–450)
PMV BLD AUTO: 8.4 FL (ref 6–12)
POTASSIUM SERPL-SCNC: 4 MMOL/L (ref 3.5–5.2)
PROT SERPL-MCNC: 7.1 G/DL (ref 6–8.5)
QT INTERVAL: 366 MS
RBC # BLD AUTO: 4.91 10*6/MM3 (ref 3.77–5.28)
SALICYLATES SERPL-MCNC: <0.3 MG/DL
SODIUM SERPL-SCNC: 135 MMOL/L (ref 136–145)
TROPONIN T SERPL HS-MCNC: <6 NG/L
WBC NRBC COR # BLD: 6.12 10*3/MM3 (ref 3.4–10.8)
WHOLE BLOOD HOLD COAG: NORMAL
WHOLE BLOOD HOLD SPECIMEN: NORMAL

## 2023-04-19 PROCEDURE — 84484 ASSAY OF TROPONIN QUANT: CPT | Performed by: NURSE PRACTITIONER

## 2023-04-19 PROCEDURE — 80307 DRUG TEST PRSMV CHEM ANLYZR: CPT

## 2023-04-19 PROCEDURE — 85025 COMPLETE CBC W/AUTO DIFF WBC: CPT

## 2023-04-19 PROCEDURE — 80143 DRUG ASSAY ACETAMINOPHEN: CPT

## 2023-04-19 PROCEDURE — 84702 CHORIONIC GONADOTROPIN TEST: CPT | Performed by: NURSE PRACTITIONER

## 2023-04-19 PROCEDURE — 80053 COMPREHEN METABOLIC PANEL: CPT

## 2023-04-19 PROCEDURE — 93005 ELECTROCARDIOGRAM TRACING: CPT

## 2023-04-19 PROCEDURE — 82077 ASSAY SPEC XCP UR&BREATH IA: CPT

## 2023-04-19 PROCEDURE — 80179 DRUG ASSAY SALICYLATE: CPT

## 2023-04-19 PROCEDURE — 36415 COLL VENOUS BLD VENIPUNCTURE: CPT

## 2023-04-19 PROCEDURE — 71045 X-RAY EXAM CHEST 1 VIEW: CPT

## 2023-04-19 PROCEDURE — 99284 EMERGENCY DEPT VISIT MOD MDM: CPT

## 2023-04-19 RX ORDER — SODIUM CHLORIDE 0.9 % (FLUSH) 0.9 %
10 SYRINGE (ML) INJECTION AS NEEDED
Status: DISCONTINUED | OUTPATIENT
Start: 2023-04-19 | End: 2023-04-20 | Stop reason: HOSPADM
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
## 2023-04-20 RX ORDER — IBUPROFEN 400 MG/1
800 TABLET ORAL ONCE
Status: COMPLETED | OUTPATIENT
Start: 2023-04-20 | End: 2023-04-20

## 2023-04-20 RX ADMIN — IBUPROFEN 800 MG: 400 TABLET, FILM COATED ORAL at 00:40

## 2023-04-25 PROCEDURE — 99283 EMERGENCY DEPT VISIT LOW MDM: CPT

## 2023-04-26 ENCOUNTER — APPOINTMENT (OUTPATIENT)
Dept: CT IMAGING | Facility: HOSPITAL | Age: 28
End: 2023-04-26
Payer: MEDICAID

## 2023-04-26 ENCOUNTER — HOSPITAL ENCOUNTER (EMERGENCY)
Facility: HOSPITAL | Age: 28
Discharge: HOME | End: 2023-04-26
Attending: EMERGENCY MEDICINE
Payer: MEDICAID

## 2023-04-26 VITALS
RESPIRATION RATE: 18 BRPM | BODY MASS INDEX: 31.68 KG/M2 | WEIGHT: 178.79 LBS | HEART RATE: 83 BPM | SYSTOLIC BLOOD PRESSURE: 133 MMHG | HEIGHT: 63 IN | OXYGEN SATURATION: 99 % | DIASTOLIC BLOOD PRESSURE: 91 MMHG | TEMPERATURE: 98 F

## 2023-04-26 DIAGNOSIS — R91.1 PULMONARY NODULE: ICD-10-CM

## 2023-04-26 DIAGNOSIS — R10.84 GENERALIZED ABDOMINAL PAIN: Primary | ICD-10-CM

## 2023-04-26 LAB
ALBUMIN SERPL-MCNC: 4.3 G/DL (ref 3.5–5.2)
ALBUMIN/GLOB SERPL: 1.1 G/DL
ALP SERPL-CCNC: 94 U/L (ref 39–117)
ALT SERPL W P-5'-P-CCNC: 20 U/L (ref 1–33)
ANION GAP SERPL CALCULATED.3IONS-SCNC: 9.4 MMOL/L (ref 5–15)
AST SERPL-CCNC: 14 U/L (ref 1–32)
BASOPHILS # BLD AUTO: 0.01 10*3/MM3 (ref 0–0.2)
BASOPHILS NFR BLD AUTO: 0.2 % (ref 0–1.5)
BILIRUB SERPL-MCNC: 0.2 MG/DL (ref 0–1.2)
BUN SERPL-MCNC: 10 MG/DL (ref 6–20)
BUN/CREAT SERPL: 14.3 (ref 7–25)
CALCIUM SPEC-SCNC: 9.4 MG/DL (ref 8.6–10.5)
CHLORIDE SERPL-SCNC: 107 MMOL/L (ref 98–107)
CO2 SERPL-SCNC: 21.6 MMOL/L (ref 22–29)
CREAT SERPL-MCNC: 0.7 MG/DL (ref 0.57–1)
DEPRECATED RDW RBC AUTO: 40.3 FL (ref 37–54)
EGFRCR SERPLBLD CKD-EPI 2021: 120.2 ML/MIN/1.73
EOSINOPHIL # BLD AUTO: 0.07 10*3/MM3 (ref 0–0.4)
EOSINOPHIL NFR BLD AUTO: 1.3 % (ref 0.3–6.2)
ERYTHROCYTE [DISTWIDTH] IN BLOOD BY AUTOMATED COUNT: 15 % (ref 12.3–15.4)
GLOBULIN UR ELPH-MCNC: 3.8 GM/DL
GLUCOSE SERPL-MCNC: 173 MG/DL (ref 65–99)
HCG INTACT+B SERPL-ACNC: <0.5 MIU/ML
HCT VFR BLD AUTO: 41.1 % (ref 34–46.6)
HGB BLD-MCNC: 12.9 G/DL (ref 12–15.9)
HOLD SPECIMEN: NORMAL
HOLD SPECIMEN: NORMAL
IMM GRANULOCYTES # BLD AUTO: 0.01 10*3/MM3 (ref 0–0.05)
IMM GRANULOCYTES NFR BLD AUTO: 0.2 % (ref 0–0.5)
LIPASE SERPL-CCNC: 48 U/L (ref 13–60)
LYMPHOCYTES # BLD AUTO: 2.58 10*3/MM3 (ref 0.7–3.1)
LYMPHOCYTES NFR BLD AUTO: 48.2 % (ref 19.6–45.3)
MCH RBC QN AUTO: 23.8 PG (ref 26.6–33)
MCHC RBC AUTO-ENTMCNC: 31.4 G/DL (ref 31.5–35.7)
MCV RBC AUTO: 75.8 FL (ref 79–97)
MONOCYTES # BLD AUTO: 0.34 10*3/MM3 (ref 0.1–0.9)
MONOCYTES NFR BLD AUTO: 6.4 % (ref 5–12)
NEUTROPHILS NFR BLD AUTO: 2.34 10*3/MM3 (ref 1.7–7)
NEUTROPHILS NFR BLD AUTO: 43.7 % (ref 42.7–76)
NRBC BLD AUTO-RTO: 0 /100 WBC (ref 0–0.2)
PLATELET # BLD AUTO: 258 10*3/MM3 (ref 140–450)
PMV BLD AUTO: 9.1 FL (ref 6–12)
POTASSIUM SERPL-SCNC: 3.8 MMOL/L (ref 3.5–5.2)
PROT SERPL-MCNC: 8.1 G/DL (ref 6–8.5)
QT INTERVAL: 366 MS
RBC # BLD AUTO: 5.42 10*6/MM3 (ref 3.77–5.28)
SODIUM SERPL-SCNC: 138 MMOL/L (ref 136–145)
WBC NRBC COR # BLD: 5.35 10*3/MM3 (ref 3.4–10.8)
WHOLE BLOOD HOLD COAG: NORMAL
WHOLE BLOOD HOLD SPECIMEN: NORMAL

## 2023-04-26 PROCEDURE — 74176 CT ABD & PELVIS W/O CONTRAST: CPT

## 2023-04-26 PROCEDURE — 36415 COLL VENOUS BLD VENIPUNCTURE: CPT

## 2023-04-26 PROCEDURE — 25010000002 ONDANSETRON PER 1 MG

## 2023-04-26 PROCEDURE — 84702 CHORIONIC GONADOTROPIN TEST: CPT

## 2023-04-26 PROCEDURE — 63710000001 ONDANSETRON ODT 4 MG TABLET DISPERSIBLE: Performed by: NURSE PRACTITIONER

## 2023-04-26 PROCEDURE — 96374 THER/PROPH/DIAG INJ IV PUSH: CPT

## 2023-04-26 PROCEDURE — 80053 COMPREHEN METABOLIC PANEL: CPT

## 2023-04-26 PROCEDURE — 85025 COMPLETE CBC W/AUTO DIFF WBC: CPT

## 2023-04-26 PROCEDURE — 83690 ASSAY OF LIPASE: CPT

## 2023-04-26 RX ORDER — ONDANSETRON 2 MG/ML
4 INJECTION INTRAMUSCULAR; INTRAVENOUS ONCE
Status: COMPLETED | OUTPATIENT
Start: 2023-04-26 | End: 2023-04-26

## 2023-04-26 RX ORDER — ONDANSETRON 4 MG/1
4 TABLET, ORALLY DISINTEGRATING ORAL ONCE
Status: COMPLETED | OUTPATIENT
Start: 2023-04-26 | End: 2023-04-26

## 2023-04-26 RX ORDER — ALUMINA, MAGNESIA, AND SIMETHICONE 2400; 2400; 240 MG/30ML; MG/30ML; MG/30ML
15 SUSPENSION ORAL ONCE
Status: COMPLETED | OUTPATIENT
Start: 2023-04-26 | End: 2023-04-26

## 2023-04-26 RX ORDER — SODIUM CHLORIDE 0.9 % (FLUSH) 0.9 %
10 SYRINGE (ML) INJECTION AS NEEDED
Status: DISCONTINUED | OUTPATIENT
Start: 2023-04-26 | End: 2023-04-26 | Stop reason: HOSPADM

## 2023-04-26 RX ORDER — DICYCLOMINE HCL 20 MG
20 TABLET ORAL EVERY 6 HOURS
Qty: 20 TABLET | Refills: 0 | Status: SHIPPED | OUTPATIENT
Start: 2023-04-26

## 2023-04-26 RX ORDER — LIDOCAINE HYDROCHLORIDE 20 MG/ML
15 SOLUTION OROPHARYNGEAL ONCE
Status: COMPLETED | OUTPATIENT
Start: 2023-04-26 | End: 2023-04-26

## 2023-04-26 RX ADMIN — LIDOCAINE HYDROCHLORIDE 15 ML: 20 SOLUTION ORAL at 05:05

## 2023-04-26 RX ADMIN — ALUMINUM HYDROXIDE, MAGNESIUM HYDROXIDE, AND DIMETHICONE 15 ML: 400; 400; 40 SUSPENSION ORAL at 05:05

## 2023-04-26 RX ADMIN — SODIUM CHLORIDE 500 ML: 9 INJECTION, SOLUTION INTRAVENOUS at 07:43

## 2023-04-26 RX ADMIN — ONDANSETRON 4 MG: 2 INJECTION INTRAMUSCULAR; INTRAVENOUS at 07:44

## 2023-04-26 RX ADMIN — ONDANSETRON 4 MG: 4 TABLET, ORALLY DISINTEGRATING ORAL at 05:05

## 2023-04-26 NOTE — ED PROVIDER NOTES
"Time: 7:18 AM EDT  Date of encounter:  2023  Independent Historian/Clinical History and Information was obtained by:   Patient  Chief Complaint: Abdominal pain    History is limited by: N/A    History of Present Illness:  Patient is a 27 y.o. year old female who presents to the emergency department for evaluation of abdominal pain.  Patient states she began having right upper quadrant abdominal pain last night.  Patient states she is having associated nausea and vomiting.  Patient denies diarrhea, fever, or dysuria.  Patient states she feels like the pain is \"around the liver and gallbladder\".  Patient states the pain will radiate up towards her chest. Pain is intermittent.     HPI    Patient Care Team  Primary Care Provider: Provider, No Known    Past Medical History:     Allergies   Allergen Reactions   • Morphine Anaphylaxis   • Peanut Oil Anaphylaxis   • Rocephin [Ceftriaxone] Nausea And Vomiting   • Sulfa Antibiotics Anaphylaxis   • Topiramate Anaphylaxis   • Iodine Other (See Comments)   • Latex Hives   • Shellfish-Derived Products Rash     Past Medical History:   Diagnosis Date   • Borderline personality disorder    • Diabetes mellitus    • Endocarditis    • Endocarditis    • Hepatitis C    • Mood disorder    • PTSD (post-traumatic stress disorder)    • Seizures      Past Surgical History:   Procedure Laterality Date   • BACK SURGERY     •  SECTION       History reviewed. No pertinent family history.    Home Medications:  Prior to Admission medications    Medication Sig Start Date End Date Taking? Authorizing Provider   cephalexin (KEFLEX) 500 MG capsule Take 1 capsule by mouth 2 (Two) Times a Day. 21   Eyal Milan DO   escitalopram (LEXAPRO) 10 MG tablet Take 1 tablet by mouth Daily. Indications: Major Depressive Disorder 21   Gutierrez Palma MD   hydrOXYzine pamoate (VISTARIL) 50 MG capsule Take 1 capsule by mouth 2 (Two) Times a Day As Needed for Anxiety. Indications: Feeling " "Anxious 8/16/21   Gutierrez Palma MD   levETIRAcetam (KEPPRA) 500 MG tablet Take 1 tablet by mouth Every 12 (Twelve) Hours. 6/16/21   Bob Bruce, DO   naproxen (EC NAPROSYN) 500 MG EC tablet Take 1 tablet by mouth 2 (Two) Times a Day With Meals. 8/17/21   Eyal Milan, DO   omeprazole (PrilOSEC) 20 MG capsule Take 1 capsule by mouth Daily. 6/16/21   Bob Bruce, DO   ondansetron ODT (ZOFRAN-ODT) 4 MG disintegrating tablet Place 1 tablet on the tongue Every 8 (Eight) Hours As Needed for Nausea. 8/17/21   Eyal Milan, DO   traZODone (DESYREL) 100 MG tablet Take 1 tablet by mouth At Night As Needed for Sleep. Indications: Trouble Sleeping 8/16/21   Gutierrez Palma MD   valACYclovir (VALTREX) 1000 MG tablet Take 1 tablet by mouth Daily. Indications: Infection of the Skin and/or Soft Tissue 8/16/21   Gutierrez Palma MD        Social History:   Social History     Tobacco Use   • Smoking status: Never   • Smokeless tobacco: Never   Vaping Use   • Vaping Use: Never used   Substance Use Topics   • Alcohol use: Never   • Drug use: Not Currently     Types: IV, Methamphetamines, Marijuana         Review of Systems:  Review of Systems   Constitutional: Negative for fever.   Respiratory: Negative for shortness of breath.    Cardiovascular: Negative for chest pain.   Gastrointestinal: Positive for abdominal pain, nausea and vomiting. Negative for diarrhea.   Genitourinary: Negative for dysuria.   All other systems reviewed and are negative.       Physical Exam:  /91   Pulse 83   Temp 98 °F (36.7 °C) (Oral)   Resp 18   Ht 160 cm (63\")   Wt 81.1 kg (178 lb 12.7 oz)   LMP 04/02/2023 (Approximate)   SpO2 99%   BMI 31.67 kg/m²     Physical Exam  Vitals and nursing note reviewed.   Constitutional:       General: She is not in acute distress.     Appearance: Normal appearance. She is well-developed. She is not ill-appearing, toxic-appearing or diaphoretic.   HENT:      Head: Normocephalic and atraumatic.      Nose: Nose " normal.   Eyes:      Extraocular Movements: Extraocular movements intact.      Conjunctiva/sclera: Conjunctivae normal.      Pupils: Pupils are equal, round, and reactive to light.   Cardiovascular:      Rate and Rhythm: Normal rate and regular rhythm.      Heart sounds: Normal heart sounds.   Pulmonary:      Effort: Pulmonary effort is normal.      Breath sounds: Normal breath sounds.   Abdominal:      General: Abdomen is flat. Bowel sounds are normal. There is no distension.      Palpations: Abdomen is soft. There is no hepatomegaly, splenomegaly, mass or pulsatile mass.      Tenderness: There is generalized abdominal tenderness.      Hernia: No hernia is present.   Musculoskeletal:         General: Normal range of motion.      Cervical back: Normal range of motion and neck supple.   Skin:     General: Skin is warm and dry.   Neurological:      General: No focal deficit present.      Mental Status: She is alert and oriented to person, place, and time.   Psychiatric:         Mood and Affect: Mood normal.         Behavior: Behavior normal.         Thought Content: Thought content normal.         Judgment: Judgment normal.                  Procedures:  Procedures      Medical Decision Making:      Comorbidities that affect care:    Hepatitis C, Diabetes    External Notes reviewed:    Previous Radiological Studies: CT abdomen pelvis completed on 2-1-2023      The following orders were placed and all results were independently analyzed by me:  Orders Placed This Encounter   Procedures   • CT Abdomen Pelvis Without Contrast   • [place] Draw   • Comprehensive Metabolic Panel   • Lipase   • Urinalysis With Microscopic If Indicated (No Culture) - Urine, Clean Catch   • hCG, Quantitative, Pregnancy   • CBC Auto Differential   • Urine Drug Screen - Urine, Clean Catch   • NPO Diet NPO Type: Strict NPO   • Undress & Gown   • Insert Peripheral IV   • CBC & Differential   • Green Top (Gel)   • Lavender Top   • Gold Top - SST   •  Light Blue Top       Medications Given in the Emergency Department:  Medications   sodium chloride 0.9 % flush 10 mL (has no administration in time range)   sodium chloride 0.9 % bolus 500 mL (500 mL Intravenous New Bag 4/26/23 0743)   ondansetron ODT (ZOFRAN-ODT) disintegrating tablet 4 mg (4 mg Oral Given 4/26/23 0505)   aluminum-magnesium hydroxide-simethicone (MAALOX MAX) 400-400-40 MG/5ML suspension 15 mL (15 mL Oral Given 4/26/23 0505)   Lidocaine Viscous HCl (XYLOCAINE) 2 % solution 15 mL (15 mL Mouth/Throat Given 4/26/23 0505)   ondansetron (ZOFRAN) injection 4 mg (4 mg Intravenous Given 4/26/23 0744)        ED Course:    ED Course as of 04/26/23 0803 Wed Apr 26, 2023   0757 Attempted to get urine sample multiple times and patient has went to the restroom without giving a sample.  Patient has no urinary tract symptoms thus will not complete in the emergency department. [MD]   0759 CT Abdomen Pelvis Without Contrast  1. No acute process seen within the abdomen or pelvis.  No evidence of renal or ureteral stone.    2. Normal appendix  3. Incidental 8 mm noncalcified nodule within the medial left lower lobe.  This is most likely   infectious or inflammatory given the patient's age.  Given the patient has a history of smoking,   consider follow-up CT scan of the chest in 12 months.   [MD]      ED Course User Index  [MD] Robinson Gates PA-C       Labs:    Lab Results (last 24 hours)     Procedure Component Value Units Date/Time    CBC & Differential [[id number]]  (Abnormal) Collected: 04/26/23 0039    Specimen: Blood Updated: 04/26/23 0057    Narrative:      The following orders were created for panel order CBC & Differential.  Procedure                               Abnormality         Status                     ---------                               -----------         ------                     CBC Auto Differential[[id number]]        Abnormal            Final result                 Please view results  for these tests on the individual orders.    Comprehensive Metabolic Panel [[id number]]  (Abnormal) Collected: 04/26/23 0039    Specimen: Blood Updated: 04/26/23 0120     Glucose 173 mg/dL      BUN 10 mg/dL      Creatinine 0.70 mg/dL      Sodium 138 mmol/L      Potassium 3.8 mmol/L      Chloride 107 mmol/L      CO2 21.6 mmol/L      Calcium 9.4 mg/dL      Total Protein 8.1 g/dL      Albumin 4.3 g/dL      ALT (SGPT) 20 U/L      AST (SGOT) 14 U/L      Alkaline Phosphatase 94 U/L      Total Bilirubin 0.2 mg/dL      Globulin 3.8 gm/dL      A/G Ratio 1.1 g/dL      BUN/Creatinine Ratio 14.3     Anion Gap 9.4 mmol/L      eGFR 120.2 mL/min/1.73     Narrative:      GFR Normal >60  Chronic Kidney Disease <60  Kidney Failure <15      Lipase [[id number]]  (Normal) Collected: 04/26/23 0039    Specimen: Blood Updated: 04/26/23 0119     Lipase 48 U/L     hCG, Quantitative, Pregnancy [[id number]] Collected: 04/26/23 0039    Specimen: Blood Updated: 04/26/23 0118     HCG Quantitative <0.50 mIU/mL     Narrative:      HCG Ranges by Gestational Age    Females - non-pregnant premenopausal   </= 1mIU/mL HCG  Females - postmenopausal               </= 7mIU/mL HCG    3 Weeks       5.4   -      72 mIU/mL  4 Weeks      10.2   -     708 mIU/mL  5 Weeks       217   -   8,245 mIU/mL  6 Weeks       152   -  32,177 mIU/mL  7 Weeks     4,059   - 153,767 mIU/mL  8 Weeks    31,366   - 149,094 mIU/mL  9 Weeks    59,109   - 135,901 mIU/mL  10 Weeks   44,186   - 170,409 mIU/mL  12 Weeks   27,107   - 201,615 mIU/mL  14 Weeks   24,302   -  93,646 mIU/mL  15 Weeks   12,540   -  69,747 mIU/mL  16 Weeks    8,904   -  55,332 mIU/mL  17 Weeks    8,240   -  51,793 mIU/mL  18 Weeks    9,649   -  55,271 mIU/mL      CBC Auto Differential [[id number]]  (Abnormal) Collected: 04/26/23 0039    Specimen: Blood Updated: 04/26/23 0057     WBC 5.35 10*3/mm3      RBC 5.42 10*6/mm3      Hemoglobin 12.9 g/dL      Hematocrit 41.1 %      MCV 75.8 fL      MCH 23.8 pg      MCHC 31.4  g/dL      RDW 15.0 %      RDW-SD 40.3 fl      MPV 9.1 fL      Platelets 258 10*3/mm3      Neutrophil % 43.7 %      Lymphocyte % 48.2 %      Monocyte % 6.4 %      Eosinophil % 1.3 %      Basophil % 0.2 %      Immature Grans % 0.2 %      Neutrophils, Absolute 2.34 10*3/mm3      Lymphocytes, Absolute 2.58 10*3/mm3      Monocytes, Absolute 0.34 10*3/mm3      Eosinophils, Absolute 0.07 10*3/mm3      Basophils, Absolute 0.01 10*3/mm3      Immature Grans, Absolute 0.01 10*3/mm3      nRBC 0.0 /100 WBC            Imaging:    CT Abdomen Pelvis Without Contrast    Result Date: 4/26/2023  PROCEDURE: CT ABDOMEN PELVIS WO CONTRAST  COMPARISON: [facility], CT, CHEST W CONTRAST R/O PE, 5/03/2013, 22:16.  [facility], CT, LUMBAR SPINE W/O CONTRAST, 9/17/2020, 20:34.  [facility], CT, ABDMEN/PELVIS WITH CONTRAST, 7/22/2012, 22:01.  INDICATIONS: GENERALIZED ABDOMEN PAIN WITH VOMITTING  TECHNIQUE: CT images were created without intravenous contrast.   PROTOCOL:   Standard imaging protocol performed    RADIATION:   DLP: 550.7mGy*cm   Automated exposure control was utilized to minimize radiation dose.  FINDINGS:  Within the medial left lower lobe there is a noncalcified 8 mm pulmonary nodule.  Given the patient's age this is most likely infectious or inflammatory in etiology.  CT scan of the chest could be performed in 12 months to document stability.  The unenhanced liver, pancreas, and spleen are within normal limits.  Bilateral adrenal glands appear normal.  There is no evidence of renal or ureteral stone.  No hydronephrosis.  Kidneys appear within normal limits.  No abdominal retroperitoneal adenopathy.  The upper GI tract is within normal limits.  Pelvis:  Urinary bladder is within normal limits.  Uterus and ovaries appear normal.  GI tract including the appendix is normal.  No pelvic or inguinal adenopathy.  No free intraperitoneal fluid.  There are no lytic or sclerotic bony lesions  identified.        1. No acute process seen within the abdomen or pelvis.  No evidence of renal or ureteral stone.  2. Normal appendix 3. Incidental 8 mm noncalcified nodule within the medial left lower lobe.  This is most likely infectious or inflammatory given the patient's age.  Given the patient has a history of smoking, consider follow-up CT scan of the chest in 12 months.      CLOVIS SOLIS MD       Electronically Signed and Approved By: CLOVIS SOLIS MD on 4/26/2023 at 7:41                 Differential Diagnosis and Discussion:    Abdominal Pain: Based on the patient's signs and symptoms, I considered abdominal aortic aneurysm, small bowel obstruction, pancreatitis, acute cholecystitis, acute appendecitis, peptic ulcer disease, gastritis, colitis, endocrine disorders, irritable bowel syndrome and other differential diagnosis an etiology of the patient's abdominal pain.    All labs were reviewed and interpreted by me.  CT scan radiology impression was interpreted by me.    MDM  Number of Diagnoses or Management Options  Diagnosis management comments: Patient presented to the emergency department for evaluation of abdominal pain.  CBC has normal white blood cell count.  CMP is noted to have an elevated glucose of 173, patient has no diagnosis of diabetes.  hCG is negative.  Lipase unremarkable.  CT abdomen pelvis has no acute findings in the abdomen but there is a 8 mm left lung nodule noted.  This was discussed with patient and informed that she will need follow-up with PCP for CT scan in 12 months.       Amount and/or Complexity of Data Reviewed  Clinical lab tests: reviewed and ordered  Tests in the radiology section of CPT®: reviewed and ordered    Risk of Complications, Morbidity, and/or Mortality  Presenting problems: moderate  Diagnostic procedures: moderate  Management options: low    Patient Progress  Patient progress: stable       Patient Care Considerations:    NARCOTICS: I considered prescribing  opiate pain medication as an outpatient, however Pain is controlled without narcotic intervention      Consultants/Shared Management Plan:    None    Social Determinants of Health:    Patient is independent, reliable, and has access to care.       Disposition and Care Coordination:    Discharged: The patient is suitable and stable for discharge with no need for consideration of observation or admission.    I have explained the patient´s condition, diagnoses and treatment plan based on the information available to me at this time. I have answered questions and addressed any concerns. The patient has a good  understanding of the patient´s diagnosis, condition, and treatment plan as can be expected at this point. The vital signs have been stable. The patient´s condition is stable and appropriate for discharge from the emergency department.      The patient will pursue further outpatient evaluation with the primary care physician or other designated or consulting physician as outlined in the discharge instructions. They are agreeable to this plan of care and follow-up instructions have been explained in detail. The patient has received these instructions in written format and have expressed an understanding of the discharge instructions. The patient is aware that any significant change in condition or worsening of symptoms should prompt an immediate return to this or the closest emergency department or call to 911.  I have explained discharge medications and the need for follow up with the patient/caretakers. This was also printed in the discharge instructions. Patient was discharged with the following medications and follow up:      Medication List      New Prescriptions    dicyclomine 20 MG tablet  Commonly known as: BENTYL  Take 1 tablet by mouth Every 6 (Six) Hours.           Where to Get Your Medications      These medications were sent to [facility]/pharmacy #[id number] - [place], [place] - [address] - [phone]   - [phone] FX  [address] [place] [address]    Hours: 24-hours Phone: [phone]   · dicyclomine 20 MG tablet      Provider, No Known  [facility] [place] [id number]    Call          Final diagnoses:   Generalized abdominal pain   Pulmonary nodule        ED Disposition     ED Disposition   Discharge    Condition   Stable    Comment   --             This medical record created using voice recognition software.           Robinson Gates PA-C  04/26/23 0803

## 2023-04-26 NOTE — DISCHARGE INSTRUCTIONS
Your lab work and CT scan completed in the emergency department showed no causes of your abdominal pain.  There is a 8 mm pulmonary nodule noted on the left side.  Is important follow-up with your primary care provider in 12 months to have CT scan completed for monitoring of this nodule.

## 2023-04-28 ENCOUNTER — HOSPITAL ENCOUNTER (EMERGENCY)
Facility: HOSPITAL | Age: 28
Discharge: HOME | End: 2023-04-28
Attending: EMERGENCY MEDICINE
Payer: MEDICAID

## 2023-04-28 ENCOUNTER — APPOINTMENT (OUTPATIENT)
Dept: GENERAL RADIOLOGY | Facility: HOSPITAL | Age: 28
End: 2023-04-28
Payer: MEDICAID

## 2023-04-28 VITALS
RESPIRATION RATE: 16 BRPM | HEIGHT: 63 IN | OXYGEN SATURATION: 100 % | HEART RATE: 98 BPM | BODY MASS INDEX: 31.09 KG/M2 | TEMPERATURE: 98.3 F | SYSTOLIC BLOOD PRESSURE: 111 MMHG | WEIGHT: 175.49 LBS | DIASTOLIC BLOOD PRESSURE: 68 MMHG

## 2023-04-28 DIAGNOSIS — Z59.00 HOMELESSNESS: ICD-10-CM

## 2023-04-28 DIAGNOSIS — J40 BRONCHITIS: Primary | ICD-10-CM

## 2023-04-28 LAB
FLUAV AG NPH QL: NEGATIVE
FLUBV AG NPH QL IA: NEGATIVE
SARS-COV-2 RNA RESP QL NAA+PROBE: NOT DETECTED

## 2023-04-28 PROCEDURE — U0004 COV-19 TEST NON-CDC HGH THRU: HCPCS

## 2023-04-28 PROCEDURE — 99284 EMERGENCY DEPT VISIT MOD MDM: CPT

## 2023-04-28 PROCEDURE — 99283 EMERGENCY DEPT VISIT LOW MDM: CPT

## 2023-04-28 PROCEDURE — 94640 AIRWAY INHALATION TREATMENT: CPT

## 2023-04-28 PROCEDURE — 71045 X-RAY EXAM CHEST 1 VIEW: CPT

## 2023-04-28 PROCEDURE — 94799 UNLISTED PULMONARY SVC/PX: CPT

## 2023-04-28 PROCEDURE — 63710000001 PREDNISONE PER 5 MG: Performed by: NURSE PRACTITIONER

## 2023-04-28 PROCEDURE — 87804 INFLUENZA ASSAY W/OPTIC: CPT

## 2023-04-28 PROCEDURE — C9803 HOPD COVID-19 SPEC COLLECT: HCPCS | Performed by: EMERGENCY MEDICINE

## 2023-04-28 RX ORDER — PREDNISONE 50 MG/1
50 TABLET ORAL DAILY
Qty: 4 TABLET | Refills: 0 | Status: SHIPPED | OUTPATIENT
Start: 2023-04-28

## 2023-04-28 RX ORDER — AZITHROMYCIN 250 MG/1
TABLET, FILM COATED ORAL
Qty: 6 TABLET | Refills: 0 | Status: SHIPPED | OUTPATIENT
Start: 2023-04-28

## 2023-04-28 RX ORDER — IPRATROPIUM BROMIDE AND ALBUTEROL SULFATE 2.5; .5 MG/3ML; MG/3ML
3 SOLUTION RESPIRATORY (INHALATION) ONCE
Status: COMPLETED | OUTPATIENT
Start: 2023-04-28 | End: 2023-04-28

## 2023-04-28 RX ORDER — ALBUTEROL SULFATE 90 UG/1
2 AEROSOL, METERED RESPIRATORY (INHALATION) EVERY 4 HOURS PRN
Qty: 6.7 G | Refills: 0 | Status: SHIPPED | OUTPATIENT
Start: 2023-04-28

## 2023-04-28 RX ORDER — AZITHROMYCIN 250 MG/1
500 TABLET, FILM COATED ORAL ONCE
Status: COMPLETED | OUTPATIENT
Start: 2023-04-28 | End: 2023-04-28

## 2023-04-28 RX ADMIN — AZITHROMYCIN MONOHYDRATE 500 MG: 250 TABLET ORAL at 03:53

## 2023-04-28 RX ADMIN — IPRATROPIUM BROMIDE AND ALBUTEROL SULFATE 3 ML: 2.5; .5 SOLUTION RESPIRATORY (INHALATION) at 03:44

## 2023-04-28 RX ADMIN — PREDNISONE 60 MG: 50 TABLET ORAL at 03:52

## 2023-04-28 SDOH — ECONOMIC STABILITY - HOUSING INSECURITY: HOMELESSNESS UNSPECIFIED: Z59.00

## 2023-04-28 NOTE — ED PROVIDER NOTES
Time: 3:27 AM EDT  Date of encounter:  2023  Independent Historian/Clinical History and Information was obtained by:   Patient  Chief Complaint: COUGH/WHEEZING    History is limited by: N/A    History of Present Illness:    The patient presents to the emergency department and states that she developed cough last night or this morning.  She states she has been coughing up some yellow sputum.  She reports that she is a smoker.  She states she also has a history of asthma.  She states that she is had no recent fevers.  The patient does report that she is homeless at this time.  She states that she was out in the rain this evening and did get wet which seemed to make her symptoms worse.  She denies any chest pain.  She is in no respiratory distress on exam.  She does have some diffuse wheezing throughout.  She has no swelling to her ankles or feet.  She has no redness or swelling to her calves.  The patient does report that she is very tired and continues to do doze off and has to be awakened for every question.  She is alert and oriented while awake.      History provided by:  Patient   used: No        Patient Care Team  Primary Care Provider: Provider, No Known    Past Medical History:     Allergies   Allergen Reactions   • Morphine Anaphylaxis   • Peanut Oil Anaphylaxis   • Rocephin [Ceftriaxone] Nausea And Vomiting   • Sulfa Antibiotics Anaphylaxis   • Topiramate Anaphylaxis   • Iodine Other (See Comments)   • Latex Hives   • Shellfish-Derived Products Rash     Past Medical History:   Diagnosis Date   • Borderline personality disorder    • Diabetes mellitus    • Endocarditis    • Endocarditis    • Hepatitis C    • Mood disorder    • PTSD (post-traumatic stress disorder)    • Seizures      Past Surgical History:   Procedure Laterality Date   • BACK SURGERY     •  SECTION       No family history on file.    Home Medications:  Prior to Admission medications    Medication Sig Start Date  End Date Taking? Authorizing Provider   cephalexin (KEFLEX) 500 MG capsule Take 1 capsule by mouth 2 (Two) Times a Day. 8/17/21   Eyal Milan, DO   dicyclomine (BENTYL) 20 MG tablet Take 1 tablet by mouth Every 6 (Six) Hours. 4/26/23   Robinson Gates PA-C   escitalopram (LEXAPRO) 10 MG tablet Take 1 tablet by mouth Daily. Indications: Major Depressive Disorder 8/17/21   Gutierrez Palma MD   hydrOXYzine pamoate (VISTARIL) 50 MG capsule Take 1 capsule by mouth 2 (Two) Times a Day As Needed for Anxiety. Indications: Feeling Anxious 8/16/21   Gutierrez Palma MD   levETIRAcetam (KEPPRA) 500 MG tablet Take 1 tablet by mouth Every 12 (Twelve) Hours. 6/16/21   Bob Bruce, DO   naproxen (EC NAPROSYN) 500 MG EC tablet Take 1 tablet by mouth 2 (Two) Times a Day With Meals. 8/17/21   Eyal Milan,    omeprazole (PrilOSEC) 20 MG capsule Take 1 capsule by mouth Daily. 6/16/21   Bob Bruce, DO   ondansetron ODT (ZOFRAN-ODT) 4 MG disintegrating tablet Place 1 tablet on the tongue Every 8 (Eight) Hours As Needed for Nausea. 8/17/21   Eyal Milan, DO   traZODone (DESYREL) 100 MG tablet Take 1 tablet by mouth At Night As Needed for Sleep. Indications: Trouble Sleeping 8/16/21   Gutierrez Palma MD   valACYclovir (VALTREX) 1000 MG tablet Take 1 tablet by mouth Daily. Indications: Infection of the Skin and/or Soft Tissue 8/16/21   Gutierrez Palma MD        Social History:   Social History     Tobacco Use   • Smoking status: Never   • Smokeless tobacco: Never   Vaping Use   • Vaping Use: Never used   Substance Use Topics   • Alcohol use: Never   • Drug use: Not Currently     Types: IV, Methamphetamines, Marijuana         Review of Systems:  Review of Systems   Constitutional: Positive for fatigue. Negative for chills and fever.   HENT: Positive for congestion. Negative for ear pain, sore throat and trouble swallowing.    Eyes: Negative for pain.   Respiratory: Positive for cough and wheezing. Negative for chest tightness and  "shortness of breath.    Cardiovascular: Negative for chest pain and leg swelling.   Gastrointestinal: Negative for abdominal pain, diarrhea, nausea and vomiting.   Genitourinary: Negative for flank pain and hematuria.   Musculoskeletal: Negative for back pain, joint swelling, neck pain and neck stiffness.   Skin: Negative for pallor and rash.   Neurological: Negative for seizures and headaches.   All other systems reviewed and are negative.       Physical Exam:  /68   Pulse 98   Temp 98.3 °F (36.8 °C) (Oral)   Resp 16   Ht 160 cm (63\")   Wt 79.6 kg (175 lb 7.8 oz)   LMP 04/02/2023 (Approximate)   SpO2 100%   BMI 31.09 kg/m²     Physical Exam  Vitals and nursing note reviewed.   Constitutional:       General: She is not in acute distress.     Appearance: Normal appearance. She is not ill-appearing or toxic-appearing.   HENT:      Head: Normocephalic and atraumatic.      Mouth/Throat:      Mouth: Mucous membranes are moist.   Eyes:      General: No scleral icterus.  Cardiovascular:      Rate and Rhythm: Normal rate and regular rhythm.      Pulses: Normal pulses.   Pulmonary:      Effort: Pulmonary effort is normal. No respiratory distress.      Breath sounds: Wheezing present.   Abdominal:      General: Abdomen is flat.      Palpations: Abdomen is soft.      Tenderness: There is no abdominal tenderness. There is no guarding or rebound.   Musculoskeletal:         General: No swelling or tenderness. Normal range of motion.      Cervical back: Normal range of motion and neck supple. No rigidity or tenderness.      Right lower leg: No edema.      Left lower leg: No edema.   Lymphadenopathy:      Cervical: No cervical adenopathy.   Skin:     General: Skin is warm and dry.      Capillary Refill: Capillary refill takes less than 2 seconds.      Findings: No erythema or rash.   Neurological:      General: No focal deficit present.      Mental Status: She is alert and oriented to person, place, and time. Mental " status is at baseline.   Psychiatric:         Mood and Affect: Mood normal.         Behavior: Behavior normal.                  Procedures:  Procedures      Medical Decision Making:      Comorbidities that affect care:    Hepatitis C, seizures, PTSD, mood disorder, endocarditis, borderline personality disorder, Diabetes    External Notes reviewed:    None      The following orders were placed and all results were independently analyzed by me:  Orders Placed This Encounter   Procedures   • Influenza Antigen, Rapid - Swab, Nasopharynx   • COVID-19,APTIMA PANTHER(ERNESTO),[facility], NP/OP SWAB IN UTM/VTM/SALINE TRANSPORT MEDIA,24 HR TAT - Swab, Nasopharynx   • XR Chest 1 View       Medications Given in the Emergency Department:  Medications   ipratropium-albuterol (DUO-NEB) nebulizer solution 3 mL (3 mL Nebulization Given 4/28/23 0344)   predniSONE (DELTASONE) tablet 60 mg (60 mg Oral Given 4/28/23 0352)   azithromycin (ZITHROMAX) tablet 500 mg (500 mg Oral Given 4/28/23 0353)        ED Course:         Labs:    Lab Results (last 24 hours)     Procedure Component Value Units Date/Time    Influenza Antigen, Rapid - Swab, Nasopharynx [[id number]]  (Normal) Collected: 04/28/23 0041    Specimen: Swab from Nasopharynx Updated: 04/28/23 0114     Influenza A Ag, EIA Negative     Influenza B Ag, EIA Negative    COVID-19,APTIMA PANTHER(ERNESTO),[facility], NP/OP SWAB IN UTM/VTM/SALINE TRANSPORT MEDIA,24 HR TAT - Swab, Nasopharynx [[id number]] Collected: 04/28/23 0041    Specimen: Swab from Nasopharynx Updated: 04/28/23 0044           Imaging:    XR Chest 1 View    Result Date: 4/28/2023  PROCEDURE: XR CHEST 1 VW  COMPARISON: 4/19/2023.  INDICATIONS: COUGH X1 DAY.  FINDINGS:  A single AP (or PA) upright portable chest radiograph was performed.  No cardiac enlargement is seen.  No acute infiltrate is appreciated.  No pleural effusion or pneumothorax is identified.  No significant interval change is seen since the prior study (or  studies).        No acute infiltrate is appreciated.     Please note that portions of this note were completed with a voice recognition program.  ANABEL WONG JR, MD       Electronically Signed and Approved By: ANABEL WONG JR, MD on 4/28/2023 at 1:38                  Differential Diagnosis and Discussion:    Cough: Differential diagnosis includes but is not limited to pneumonia, acute bronchitis, upper respiratory infection, ACE inhibitor use, allergic reaction, epiglottitis, seasonal allergies, chemical irritants, exercise-induced asthma, viral syndrome.  Dyspnea: Differential diagnosis includes but is not limited to metabolic acidosis, neurological disorders, psychogenic, asthma, pneumothorax, upper airway obstruction, COPD, pneumonia, noncardiogenic pulmonary edema, interstitial lung disease, anemia, congestive heart failure, and pulmonary embolism    All labs were reviewed and interpreted by me.  All X-rays impressions were independently interpreted by me.    MDM  Number of Diagnoses or Management Options  Bronchitis: minor  Homelessness: minor     Amount and/or Complexity of Data Reviewed  Clinical lab tests: reviewed  Tests in the radiology section of CPT®: reviewed    Risk of Complications, Morbidity, and/or Mortality  Presenting problems: low  Diagnostic procedures: low  Management options: low    Patient Progress  Patient progress: stable       Patient Care Considerations:    LABS: I considered ordering labs, however In the absence of any fever or significant respiratory distress I did not feel it was warranted at this time.      Consultants/Shared Management Plan:    None    Social Determinants of Health:    Patient is independent, reliable, and has access to care.       Disposition and Care Coordination:    Discharged: The patient is suitable and stable for discharge with no need for consideration of observation or admission.    I have explained the patient´s condition, diagnoses and treatment plan  based on the information available to me at this time. I have answered questions and addressed any concerns. The patient has a good  understanding of the patient´s diagnosis, condition, and treatment plan as can be expected at this point. The vital signs have been stable. The patient´s condition is stable and appropriate for discharge from the emergency department.      The patient will pursue further outpatient evaluation with the primary care physician or other designated or consulting physician as outlined in the discharge instructions. They are agreeable to this plan of care and follow-up instructions have been explained in detail. The patient has received these instructions in written format and have expressed an understanding of the discharge instructions. The patient is aware that any significant change in condition or worsening of symptoms should prompt an immediate return to this or the closest emergency department or call to 911.  I have explained discharge medications and the need for follow up with the patient/caretakers. This was also printed in the discharge instructions. Patient was discharged with the following medications and follow up:      Medication List      New Prescriptions    albuterol sulfate  (90 Base) MCG/ACT inhaler  Commonly known as: PROVENTIL HFA;VENTOLIN HFA;PROAIR HFA  Inhale 2 puffs Every 4 (Four) Hours As Needed for Wheezing or Shortness of Air.     azithromycin 250 MG tablet  Commonly known as: Zithromax Z-Camilo  Take 2 tablets by mouth on day 1, then 1 tablet daily on days 2-5     predniSONE 50 MG tablet  Commonly known as: DELTASONE  Take 1 tablet by mouth Daily.           Where to Get Your Medications      These medications were sent to [facility]/pharmacy #[id number] - [place], [place] - [address] - [phone] [facility] [phone]   [address] [place] [address]    Hours: 24-hours Phone: [phone]   · albuterol sulfate  (90 Base) MCG/ACT  inhaler  · azithromycin 250 MG tablet  · predniSONE 50 MG tablet      Provider, No Known  [facility] [place] [address]    Call   FOR FOLLOW UP       Final diagnoses:   Bronchitis   Homelessness        ED Disposition     ED Disposition   Discharge    Condition   Stable    Comment   --             This medical record created using voice recognition software.           Marci Lopez, SEB  04/28/23 0761

## 2023-04-28 NOTE — DISCHARGE INSTRUCTIONS
Rest, drink plenty of fluids.  Take your meds as prescribed.  Complete the antibiotics.  You may take over-the-counter acetaminophen and Motrin as needed for aches pains and fever.  Follow-up with a primary healthcare provider of your choice for further evaluation and treatment.  Return to the emergency department for any acutely developing respiratory distress, any fevers of 101 or greater, any persistent vomiting or any new or worse concerns.

## 2023-04-28 NOTE — Clinical Note
[facility] EMERGENCY ROOM  [address]  [place] [address]  Phone: [phone]    Yoselyn Bonilla was seen and treated in our emergency department on 4/28/2023.  She may return to work on 05/01/2023.         Thank you for choosing [facility].    Marci Lopez APRN

## 2023-04-28 NOTE — ED TRIAGE NOTES
Pt stated in triage she is a meth addict and stated she is coughing blood, pt coughing and spitting in emesis bag, no blood noted

## 2023-05-06 ENCOUNTER — HOSPITAL ENCOUNTER (EMERGENCY)
Facility: HOSPITAL | Age: 28
Discharge: HOME | End: 2023-05-06
Attending: EMERGENCY MEDICINE
Payer: MEDICAID

## 2023-05-06 VITALS
HEART RATE: 104 BPM | HEIGHT: 63 IN | OXYGEN SATURATION: 99 % | WEIGHT: 178.79 LBS | DIASTOLIC BLOOD PRESSURE: 62 MMHG | RESPIRATION RATE: 16 BRPM | SYSTOLIC BLOOD PRESSURE: 104 MMHG | TEMPERATURE: 99.7 F | BODY MASS INDEX: 31.68 KG/M2

## 2023-05-06 DIAGNOSIS — N30.00 ACUTE CYSTITIS WITHOUT HEMATURIA: Primary | ICD-10-CM

## 2023-05-06 DIAGNOSIS — R82.71 BACTERIA IN URINE: ICD-10-CM

## 2023-05-06 LAB
ALBUMIN SERPL-MCNC: 4 G/DL (ref 3.5–5.2)
ALBUMIN/GLOB SERPL: 1.3 G/DL
ALP SERPL-CCNC: 88 U/L (ref 39–117)
ALT SERPL W P-5'-P-CCNC: 16 U/L (ref 1–33)
ANION GAP SERPL CALCULATED.3IONS-SCNC: 10.6 MMOL/L (ref 5–15)
AST SERPL-CCNC: 13 U/L (ref 1–32)
BACTERIA UR QL AUTO: ABNORMAL /HPF
BASOPHILS # BLD AUTO: 0.02 10*3/MM3 (ref 0–0.2)
BASOPHILS NFR BLD AUTO: 0.3 % (ref 0–1.5)
BILIRUB SERPL-MCNC: 0.2 MG/DL (ref 0–1.2)
BILIRUB UR QL STRIP: NEGATIVE
BUN SERPL-MCNC: 16 MG/DL (ref 6–20)
BUN/CREAT SERPL: 22.2 (ref 7–25)
CALCIUM SPEC-SCNC: 9.1 MG/DL (ref 8.6–10.5)
CHLORIDE SERPL-SCNC: 102 MMOL/L (ref 98–107)
CLARITY UR: CLEAR
CO2 SERPL-SCNC: 26.4 MMOL/L (ref 22–29)
COLOR UR: YELLOW
CREAT SERPL-MCNC: 0.72 MG/DL (ref 0.57–1)
DEPRECATED RDW RBC AUTO: 38.7 FL (ref 37–54)
EGFRCR SERPLBLD CKD-EPI 2021: 116.2 ML/MIN/1.73
EOSINOPHIL # BLD AUTO: 0.08 10*3/MM3 (ref 0–0.4)
EOSINOPHIL NFR BLD AUTO: 1.1 % (ref 0.3–6.2)
ERYTHROCYTE [DISTWIDTH] IN BLOOD BY AUTOMATED COUNT: 14.6 % (ref 12.3–15.4)
GLOBULIN UR ELPH-MCNC: 3 GM/DL
GLUCOSE SERPL-MCNC: 183 MG/DL (ref 65–99)
GLUCOSE UR STRIP-MCNC: ABNORMAL MG/DL
HCT VFR BLD AUTO: 35.7 % (ref 34–46.6)
HGB BLD-MCNC: 11.7 G/DL (ref 12–15.9)
HGB UR QL STRIP.AUTO: NEGATIVE
HYALINE CASTS UR QL AUTO: ABNORMAL /LPF
IMM GRANULOCYTES # BLD AUTO: 0.01 10*3/MM3 (ref 0–0.05)
IMM GRANULOCYTES NFR BLD AUTO: 0.1 % (ref 0–0.5)
KETONES UR QL STRIP: NEGATIVE
LEUKOCYTE ESTERASE UR QL STRIP.AUTO: NEGATIVE
LYMPHOCYTES # BLD AUTO: 3.31 10*3/MM3 (ref 0.7–3.1)
LYMPHOCYTES NFR BLD AUTO: 47.4 % (ref 19.6–45.3)
MCH RBC QN AUTO: 24.2 PG (ref 26.6–33)
MCHC RBC AUTO-ENTMCNC: 32.8 G/DL (ref 31.5–35.7)
MCV RBC AUTO: 73.8 FL (ref 79–97)
MONOCYTES # BLD AUTO: 0.46 10*3/MM3 (ref 0.1–0.9)
MONOCYTES NFR BLD AUTO: 6.6 % (ref 5–12)
NEUTROPHILS NFR BLD AUTO: 3.1 10*3/MM3 (ref 1.7–7)
NEUTROPHILS NFR BLD AUTO: 44.5 % (ref 42.7–76)
NITRITE UR QL STRIP: POSITIVE
NRBC BLD AUTO-RTO: 0 /100 WBC (ref 0–0.2)
PH UR STRIP.AUTO: 6.5 [PH] (ref 5–8)
PLAT MORPH BLD: NORMAL
PLATELET # BLD AUTO: 233 10*3/MM3 (ref 140–450)
PMV BLD AUTO: 8.6 FL (ref 6–12)
POTASSIUM SERPL-SCNC: 4 MMOL/L (ref 3.5–5.2)
PROT SERPL-MCNC: 7 G/DL (ref 6–8.5)
PROT UR QL STRIP: ABNORMAL
RBC # BLD AUTO: 4.84 10*6/MM3 (ref 3.77–5.28)
RBC # UR STRIP: ABNORMAL /HPF
RBC MORPH BLD: NORMAL
REF LAB TEST METHOD: ABNORMAL
SODIUM SERPL-SCNC: 139 MMOL/L (ref 136–145)
SP GR UR STRIP: 1.03 (ref 1–1.03)
SQUAMOUS #/AREA URNS HPF: ABNORMAL /HPF
UROBILINOGEN UR QL STRIP: ABNORMAL
WBC # UR STRIP: ABNORMAL /HPF
WBC MORPH BLD: NORMAL
WBC NRBC COR # BLD: 6.98 10*3/MM3 (ref 3.4–10.8)

## 2023-05-06 PROCEDURE — 80053 COMPREHEN METABOLIC PANEL: CPT

## 2023-05-06 PROCEDURE — 85025 COMPLETE CBC W/AUTO DIFF WBC: CPT

## 2023-05-06 PROCEDURE — 87186 SC STD MICRODIL/AGAR DIL: CPT

## 2023-05-06 PROCEDURE — 81001 URINALYSIS AUTO W/SCOPE: CPT

## 2023-05-06 PROCEDURE — 99284 EMERGENCY DEPT VISIT MOD MDM: CPT

## 2023-05-06 PROCEDURE — 87086 URINE CULTURE/COLONY COUNT: CPT

## 2023-05-06 PROCEDURE — 85007 BL SMEAR W/DIFF WBC COUNT: CPT

## 2023-05-06 PROCEDURE — 96360 HYDRATION IV INFUSION INIT: CPT

## 2023-05-06 PROCEDURE — 87077 CULTURE AEROBIC IDENTIFY: CPT

## 2023-05-06 RX ORDER — PHENAZOPYRIDINE HYDROCHLORIDE 100 MG/1
100 TABLET, FILM COATED ORAL 3 TIMES DAILY PRN
Qty: 6 TABLET | Refills: 0 | Status: SHIPPED | OUTPATIENT
Start: 2023-05-06 | End: 2023-05-08

## 2023-05-06 RX ORDER — NITROFURANTOIN 25; 75 MG/1; MG/1
100 CAPSULE ORAL 2 TIMES DAILY
Qty: 10 CAPSULE | Refills: 0 | Status: SHIPPED | OUTPATIENT
Start: 2023-05-06 | End: 2023-05-11

## 2023-05-06 RX ORDER — NAPROXEN 250 MG/1
500 TABLET ORAL ONCE
Status: COMPLETED | OUTPATIENT
Start: 2023-05-06 | End: 2023-05-06

## 2023-05-06 RX ADMIN — SODIUM CHLORIDE 1000 ML: 9 INJECTION, SOLUTION INTRAVENOUS at 16:00

## 2023-05-06 RX ADMIN — NAPROXEN 500 MG: 250 TABLET ORAL at 14:31

## 2023-05-06 NOTE — ED PROVIDER NOTES
Emergency Department Encounter    Date seen: 2023  Time: 1:43 PM EDT    Room number: 40/40    Chief Complaint: back pain     HPI    History of Present Illness:  Patient is a 27 y.o. year old female who presents to the emergency department for evaluation of back pain that started 3 hours ago with no known injury.  Patient has concerns because she has had cervical fusions completed in the past.  She reports no known injury.  She does state that she has had some urinary incontinence.  She is also concerned about dehydration because she has not had anything to drink in over 5 hours.  Her back pain is reported to be in her lower back and radiating up her spine as well as down her legs.    Independent Historian/Clinical History and Information was obtained by:   Patient    History is limited by: N/A      PCP: Provider, No Known        Past Medical History:     Allergies   Allergen Reactions   • Morphine Anaphylaxis   • Peanut Oil Anaphylaxis   • Rocephin [Ceftriaxone] Nausea And Vomiting   • Sulfa Antibiotics Anaphylaxis   • Topiramate Anaphylaxis   • Iodine Other (See Comments)   • Latex Hives   • Shellfish-Derived Products Rash     Past Medical History:   Diagnosis Date   • Borderline personality disorder    • Diabetes mellitus    • Endocarditis    • Endocarditis    • Hepatitis C    • Mood disorder    • PTSD (post-traumatic stress disorder)    • Seizures      Past Surgical History:   Procedure Laterality Date   • BACK SURGERY     •  SECTION       No family history on file.    Home Medications:  Prior to Admission medications    Medication Sig Start Date End Date Taking? Authorizing Provider   albuterol sulfate  (90 Base) MCG/ACT inhaler Inhale 2 puffs Every 4 (Four) Hours As Needed for Wheezing or Shortness of Air. 23   Marci Lopez APRN   azithromycin (Zithromax Z-Camilo) 250 MG tablet Take 2 tablets by mouth on day 1, then 1 tablet daily on days 2-5 23   Marci Lopez APRN   cephalexin  (KEFLEX) 500 MG capsule Take 1 capsule by mouth 2 (Two) Times a Day. 8/17/21   Eyal Milan DO   dicyclomine (BENTYL) 20 MG tablet Take 1 tablet by mouth Every 6 (Six) Hours. 4/26/23   Robinson Gates PA-C   escitalopram (LEXAPRO) 10 MG tablet Take 1 tablet by mouth Daily. Indications: Major Depressive Disorder 8/17/21   Gutierrez Palma MD   hydrOXYzine pamoate (VISTARIL) 50 MG capsule Take 1 capsule by mouth 2 (Two) Times a Day As Needed for Anxiety. Indications: Feeling Anxious 8/16/21   Gutierrez Palma MD   levETIRAcetam (KEPPRA) 500 MG tablet Take 1 tablet by mouth Every 12 (Twelve) Hours. 6/16/21   Bob Bruce DO   naproxen (EC NAPROSYN) 500 MG EC tablet Take 1 tablet by mouth 2 (Two) Times a Day With Meals. 8/17/21   Eyal Milan DO   omeprazole (PrilOSEC) 20 MG capsule Take 1 capsule by mouth Daily. 6/16/21   Bob Bruce DO   ondansetron ODT (ZOFRAN-ODT) 4 MG disintegrating tablet Place 1 tablet on the tongue Every 8 (Eight) Hours As Needed for Nausea. 8/17/21   Eyal Milan DO   predniSONE (DELTASONE) 50 MG tablet Take 1 tablet by mouth Daily. 4/28/23   Marci Lopez APRN   traZODone (DESYREL) 100 MG tablet Take 1 tablet by mouth At Night As Needed for Sleep. Indications: Trouble Sleeping 8/16/21   Gutierrez Palma MD   valACYclovir (VALTREX) 1000 MG tablet Take 1 tablet by mouth Daily. Indications: Infection of the Skin and/or Soft Tissue 8/16/21   Gutierrez Palma MD        Social History:   Social History     Tobacco Use   • Smoking status: Never   • Smokeless tobacco: Never   Vaping Use   • Vaping Use: Never used   Substance Use Topics   • Alcohol use: Never   • Drug use: Not Currently     Types: IV, Methamphetamines, Marijuana       All labs were reviewed and interpreted by me.      Review of Systems:  Review of Systems   Constitutional: Negative for chills and fever.   HENT: Negative for congestion, ear pain and sore throat.    Eyes: Negative for pain.   Respiratory: Negative for cough, chest  "tightness and shortness of breath.    Cardiovascular: Negative for chest pain.   Gastrointestinal: Negative for abdominal pain, diarrhea, nausea and vomiting.   Genitourinary: Negative for flank pain and hematuria.        Urinary incontinence    Musculoskeletal: Positive for back pain. Negative for joint swelling.   Skin: Negative for pallor.   Neurological: Negative for seizures and headaches.   All other systems reviewed and are negative.       Physical Exam:  /62   Pulse 104   Temp 99.7 °F (37.6 °C) (Oral)   Resp 16   Ht 160 cm (63\")   Wt 81.1 kg (178 lb 12.7 oz)   SpO2 99%   BMI 31.67 kg/m²     Physical Exam  Vitals and nursing note reviewed.   Constitutional:       General: She is not in acute distress.     Appearance: Normal appearance. She is not ill-appearing or toxic-appearing.   HENT:      Head: Normocephalic and atraumatic.      Mouth/Throat:      Mouth: Mucous membranes are moist.   Eyes:      General: No scleral icterus.  Cardiovascular:      Rate and Rhythm: Normal rate and regular rhythm.      Pulses: Normal pulses.      Heart sounds: Normal heart sounds.   Pulmonary:      Effort: Pulmonary effort is normal. No respiratory distress.      Breath sounds: Normal breath sounds. No stridor. No wheezing.   Abdominal:      General: Abdomen is flat. There is no distension.      Palpations: Abdomen is soft.      Tenderness: There is no abdominal tenderness.   Musculoskeletal:         General: Tenderness (lower back) present. No swelling or deformity. Normal range of motion.      Cervical back: Normal range of motion and neck supple. No rigidity.   Skin:     General: Skin is warm and dry.      Coloration: Skin is not jaundiced or pale.      Findings: No bruising, erythema, lesion or rash.   Neurological:      Mental Status: She is alert and oriented to person, place, and time. Mental status is at baseline.      Sensory: No sensory deficit.      Coordination: Coordination normal.            "       Procedures:  Procedures      Medical Decision Making:      Comorbidities that affect care:    Hepatitis C, mood disorder, seizures, PTSD, borderline personality disorder, diabetes, endocarditis, back surgery    External Notes reviewed:    Previous Clinic Note: I have personally reviewed patient's previous medical encounters.      The following orders were placed and all results were independently analyzed by me:  Orders Placed This Encounter   Procedures   • Urine Culture - Urine,   • Urinalysis With Microscopic If Indicated (No Culture) - Urine, Clean Catch   • Urinalysis, Microscopic Only - Urine, Clean Catch   • Comprehensive Metabolic Panel   • CBC Auto Differential   • Scan Slide   • CBC & Differential       Medications Given in the Emergency Department:  Medications   naproxen (NAPROSYN) tablet 500 mg (500 mg Oral Given 5/6/23 1431)   sodium chloride 0.9 % bolus 1,000 mL (0 mL Intravenous Stopped 5/6/23 1654)        ED Course:    ED Course as of 05/06/23 1715   Sat May 06, 2023   1405 Pt was able to ambulate to restroom with minimal assistance.  [MS]      ED Course User Index  [MS] Alecia Zheng APRN       Labs:    Lab Results (last 24 hours)     Procedure Component Value Units Date/Time    Urinalysis With Microscopic If Indicated (No Culture) - Urine, Clean Catch [[id number]]  (Abnormal) Collected: 05/06/23 1431    Specimen: Urine, Clean Catch Updated: 05/06/23 1447     Color, UA Yellow     Appearance, UA Clear     pH, UA 6.5     Specific Gravity, UA 1.027     Glucose,  mg/dL (Trace)     Ketones, UA Negative     Bilirubin, UA Negative     Blood, UA Negative     Protein, UA Trace     Leuk Esterase, UA Negative     Nitrite, UA Positive     Urobilinogen, UA 1.0 E.U./dL    Urinalysis, Microscopic Only - Urine, Clean Catch [[id number]]  (Abnormal) Collected: 05/06/23 1431    Specimen: Urine, Clean Catch Updated: 05/06/23 1447     RBC, UA 0-2 /HPF      WBC, UA 6-12 /HPF      Bacteria, UA 4+ /HPF       Squamous Epithelial Cells, UA 0-2 /HPF      Hyaline Casts, UA 0-2 /LPF      Methodology Automated Microscopy    Urine Culture - Urine, Urine, Clean Catch [[id number]] Collected: 05/06/23 1431    Specimen: Urine, Clean Catch Updated: 05/06/23 1538    CBC & Differential [[id number]]  (Abnormal) Collected: 05/06/23 1559    Specimen: Blood Updated: 05/06/23 1616    Narrative:      The following orders were created for panel order CBC & Differential.  Procedure                               Abnormality         Status                     ---------                               -----------         ------                     CBC Auto Differential[[id number]]        Abnormal            Final result               Scan Slide[[id number]]                   Normal              Final result                 Please view results for these tests on the individual orders.    Comprehensive Metabolic Panel [[id number]]  (Abnormal) Collected: 05/06/23 1559    Specimen: Blood Updated: 05/06/23 1624     Glucose 183 mg/dL      BUN 16 mg/dL      Creatinine 0.72 mg/dL      Sodium 139 mmol/L      Potassium 4.0 mmol/L      Chloride 102 mmol/L      CO2 26.4 mmol/L      Calcium 9.1 mg/dL      Total Protein 7.0 g/dL      Albumin 4.0 g/dL      ALT (SGPT) 16 U/L      AST (SGOT) 13 U/L      Alkaline Phosphatase 88 U/L      Total Bilirubin 0.2 mg/dL      Globulin 3.0 gm/dL      A/G Ratio 1.3 g/dL      BUN/Creatinine Ratio 22.2     Anion Gap 10.6 mmol/L      eGFR 116.2 mL/min/1.73     Narrative:      GFR Normal >60  Chronic Kidney Disease <60  Kidney Failure <15      CBC Auto Differential [[id number]]  (Abnormal) Collected: 05/06/23 1559    Specimen: Blood Updated: 05/06/23 1616     WBC 6.98 10*3/mm3      RBC 4.84 10*6/mm3      Hemoglobin 11.7 g/dL      Hematocrit 35.7 %      MCV 73.8 fL      MCH 24.2 pg      MCHC 32.8 g/dL      RDW 14.6 %      RDW-SD 38.7 fl      MPV 8.6 fL      Platelets 233 10*3/mm3      Neutrophil % 44.5 %      Lymphocyte % 47.4 %       Monocyte % 6.6 %      Eosinophil % 1.1 %      Basophil % 0.3 %      Immature Grans % 0.1 %      Neutrophils, Absolute 3.10 10*3/mm3      Lymphocytes, Absolute 3.31 10*3/mm3      Monocytes, Absolute 0.46 10*3/mm3      Eosinophils, Absolute 0.08 10*3/mm3      Basophils, Absolute 0.02 10*3/mm3      Immature Grans, Absolute 0.01 10*3/mm3      nRBC 0.0 /100 WBC     Scan Slide [[id number]]  (Normal) Collected: 05/06/23 1559    Specimen: Blood Updated: 05/06/23 1616     RBC Morphology Normal     WBC Morphology Normal     Platelet Morphology Normal           Imaging:    No Radiology Exams Resulted Within Past 24 Hours      Differential Diagnosis and Discussion:    Back Pain: The patient presents with back pain. My differential diagnosis includes but is not limited to acute spinal epidural abscess, acute spinal epidural bleed, cauda equina syndrome, abdominal aortic aneurysm, aortic dissection, kidney stone, pyelonephritis, musculoskeletal back pain, spinal fracture, and osteoarthritis.         Patient Care Considerations:    MRI: I considered ordering an MRI however Patient's symptoms are more concerning with kidney discomfort and acute cystitis.  Additionally she was able to ambulate and there was no neuro deficits in no sensory deficits      Consultants/Shared Management Plan:    None    Social Determinants of Health:    Patient is independent, reliable, and has access to care.       Disposition and Care Coordination:    Discharged: The patient is suitable and stable for discharge with no need for consideration of observation or admission.    I have explained the patient´s condition, diagnoses and treatment plan based on the information available to me at this time. I have answered questions and addressed any concerns. The patient has a good  understanding of the patient´s diagnosis, condition, and treatment plan as can be expected at this point. The vital signs have been stable. The patient´s condition is stable and  appropriate for discharge from the emergency department.      The patient will pursue further outpatient evaluation with the primary care physician or other designated or consulting physician as outlined in the discharge instructions. They are agreeable to this plan of care and follow-up instructions have been explained in detail. The patient has received these instructions in written format and have expressed an understanding of the discharge instructions. The patient is aware that any significant change in condition or worsening of symptoms should prompt an immediate return to this or the closest emergency department or call to 911.    MDM  Number of Diagnoses or Management Options  Acute cystitis without hematuria: new and does not require workup  Bacteria in urine: new and does not require workup     Amount and/or Complexity of Data Reviewed  Clinical lab tests: ordered and reviewed  Review and summarize past medical records: yes (I have personally reviewed patient's previous medical encounters.  )    Risk of Complications, Morbidity, and/or Mortality  Presenting problems: low  Diagnostic procedures: low  Management options: low    Patient Progress  Patient progress: stable       Final diagnoses:   Acute cystitis without hematuria   Bacteria in urine        ED Disposition     ED Disposition   Discharge    Condition   Stable    Comment   --             This medical record created using voice recognition software.                     Alecia Zheng, APRN  05/06/23 6716

## 2023-05-06 NOTE — DISCHARGE INSTRUCTIONS
Please be sure to take all the antibiotics that been prescribed you today until they are gone, even if you start to feel better.  Is also important you increase your oral fluid intake particularly water.  If you develop a low-grade fever you may take Tylenol and Motrin.  Return to the ER if you develop a increase in pain, become unable to urinate, develop a fever that cannot be controlled with Tylenol or Motrin, or severe nausea, vomiting, or diarrhea

## 2023-05-07 ENCOUNTER — TELEPHONE (OUTPATIENT)
Dept: EMERGENCY DEPT | Facility: HOSPITAL | Age: 28
End: 2023-05-07
Payer: MEDICAID

## 2023-05-08 LAB — BACTERIA SPEC AEROBE CULT: ABNORMAL

## 2023-05-11 ENCOUNTER — HOSPITAL ENCOUNTER (EMERGENCY)
Facility: HOSPITAL | Age: 28
Discharge: HOME | End: 2023-05-11
Attending: EMERGENCY MEDICINE
Payer: MEDICAID

## 2023-05-11 VITALS
WEIGHT: 183.64 LBS | RESPIRATION RATE: 18 BRPM | HEIGHT: 63 IN | TEMPERATURE: 97.9 F | OXYGEN SATURATION: 99 % | SYSTOLIC BLOOD PRESSURE: 140 MMHG | DIASTOLIC BLOOD PRESSURE: 69 MMHG | HEART RATE: 91 BPM | BODY MASS INDEX: 32.54 KG/M2

## 2023-05-11 DIAGNOSIS — F19.10 SUBSTANCE ABUSE: ICD-10-CM

## 2023-05-11 DIAGNOSIS — N30.00 ACUTE CYSTITIS WITHOUT HEMATURIA: Primary | ICD-10-CM

## 2023-05-11 LAB
ALBUMIN SERPL-MCNC: 4.2 G/DL (ref 3.5–5.2)
ALBUMIN/GLOB SERPL: 1.1 G/DL
ALP SERPL-CCNC: 90 U/L (ref 39–117)
ALT SERPL W P-5'-P-CCNC: 21 U/L (ref 1–33)
AMPHET+METHAMPHET UR QL: POSITIVE
ANION GAP SERPL CALCULATED.3IONS-SCNC: 12.8 MMOL/L (ref 5–15)
APAP SERPL-MCNC: <5 MCG/ML (ref 0–30)
AST SERPL-CCNC: 26 U/L (ref 1–32)
BACTERIA UR QL AUTO: ABNORMAL /HPF
BARBITURATES UR QL SCN: NEGATIVE
BASOPHILS # BLD AUTO: 0.03 10*3/MM3 (ref 0–0.2)
BASOPHILS NFR BLD AUTO: 0.5 % (ref 0–1.5)
BENZODIAZ UR QL SCN: NEGATIVE
BILIRUB SERPL-MCNC: 0.2 MG/DL (ref 0–1.2)
BILIRUB UR QL STRIP: NEGATIVE
BUN SERPL-MCNC: 11 MG/DL (ref 6–20)
BUN/CREAT SERPL: 15.3 (ref 7–25)
CALCIUM SPEC-SCNC: 9.3 MG/DL (ref 8.6–10.5)
CANNABINOIDS SERPL QL: NEGATIVE
CHLORIDE SERPL-SCNC: 101 MMOL/L (ref 98–107)
CLARITY UR: ABNORMAL
CO2 SERPL-SCNC: 24.2 MMOL/L (ref 22–29)
COCAINE UR QL: POSITIVE
COLOR UR: YELLOW
CREAT SERPL-MCNC: 0.72 MG/DL (ref 0.57–1)
DEPRECATED RDW RBC AUTO: 39.3 FL (ref 37–54)
EGFRCR SERPLBLD CKD-EPI 2021: 116.2 ML/MIN/1.73
EOSINOPHIL # BLD AUTO: 0.15 10*3/MM3 (ref 0–0.4)
EOSINOPHIL NFR BLD AUTO: 2.5 % (ref 0.3–6.2)
ERYTHROCYTE [DISTWIDTH] IN BLOOD BY AUTOMATED COUNT: 14.5 % (ref 12.3–15.4)
ETHANOL BLD-MCNC: <10 MG/DL (ref 0–10)
ETHANOL UR QL: <0.01 %
FENTANYL UR-MCNC: NEGATIVE NG/ML
GLOBULIN UR ELPH-MCNC: 3.7 GM/DL
GLUCOSE SERPL-MCNC: 212 MG/DL (ref 65–99)
GLUCOSE UR STRIP-MCNC: ABNORMAL MG/DL
HCT VFR BLD AUTO: 35.7 % (ref 34–46.6)
HGB BLD-MCNC: 11.5 G/DL (ref 12–15.9)
HGB UR QL STRIP.AUTO: NEGATIVE
HOLD SPECIMEN: NORMAL
HOLD SPECIMEN: NORMAL
HYALINE CASTS UR QL AUTO: ABNORMAL /LPF
IMM GRANULOCYTES # BLD AUTO: 0.01 10*3/MM3 (ref 0–0.05)
IMM GRANULOCYTES NFR BLD AUTO: 0.2 % (ref 0–0.5)
KETONES UR QL STRIP: NEGATIVE
LEUKOCYTE ESTERASE UR QL STRIP.AUTO: NEGATIVE
LYMPHOCYTES # BLD AUTO: 3.29 10*3/MM3 (ref 0.7–3.1)
LYMPHOCYTES NFR BLD AUTO: 54.1 % (ref 19.6–45.3)
MCH RBC QN AUTO: 24.4 PG (ref 26.6–33)
MCHC RBC AUTO-ENTMCNC: 32.2 G/DL (ref 31.5–35.7)
MCV RBC AUTO: 75.8 FL (ref 79–97)
METHADONE UR QL SCN: NEGATIVE
MONOCYTES # BLD AUTO: 0.52 10*3/MM3 (ref 0.1–0.9)
MONOCYTES NFR BLD AUTO: 8.6 % (ref 5–12)
NEUTROPHILS NFR BLD AUTO: 2.08 10*3/MM3 (ref 1.7–7)
NEUTROPHILS NFR BLD AUTO: 34.1 % (ref 42.7–76)
NITRITE UR QL STRIP: POSITIVE
NRBC BLD AUTO-RTO: 0 /100 WBC (ref 0–0.2)
OPIATES UR QL: NEGATIVE
OXYCODONE UR QL SCN: NEGATIVE
PH UR STRIP.AUTO: 5.5 [PH] (ref 5–8)
PLATELET # BLD AUTO: 223 10*3/MM3 (ref 140–450)
PMV BLD AUTO: 8.6 FL (ref 6–12)
POTASSIUM SERPL-SCNC: 3.6 MMOL/L (ref 3.5–5.2)
PROT SERPL-MCNC: 7.9 G/DL (ref 6–8.5)
PROT UR QL STRIP: NEGATIVE
RBC # BLD AUTO: 4.71 10*6/MM3 (ref 3.77–5.28)
RBC # UR STRIP: ABNORMAL /HPF
REF LAB TEST METHOD: ABNORMAL
SALICYLATES SERPL-MCNC: <0.3 MG/DL
SODIUM SERPL-SCNC: 138 MMOL/L (ref 136–145)
SP GR UR STRIP: >1.03 (ref 1–1.03)
SQUAMOUS #/AREA URNS HPF: ABNORMAL /HPF
UROBILINOGEN UR QL STRIP: ABNORMAL
WBC # UR STRIP: ABNORMAL /HPF
WBC NRBC COR # BLD: 6.08 10*3/MM3 (ref 3.4–10.8)
WHOLE BLOOD HOLD COAG: NORMAL
WHOLE BLOOD HOLD SPECIMEN: NORMAL

## 2023-05-11 PROCEDURE — 85025 COMPLETE CBC W/AUTO DIFF WBC: CPT | Performed by: EMERGENCY MEDICINE

## 2023-05-11 PROCEDURE — 99284 EMERGENCY DEPT VISIT MOD MDM: CPT

## 2023-05-11 PROCEDURE — 80307 DRUG TEST PRSMV CHEM ANLYZR: CPT | Performed by: EMERGENCY MEDICINE

## 2023-05-11 PROCEDURE — 87186 SC STD MICRODIL/AGAR DIL: CPT

## 2023-05-11 PROCEDURE — 82077 ASSAY SPEC XCP UR&BREATH IA: CPT | Performed by: EMERGENCY MEDICINE

## 2023-05-11 PROCEDURE — 87086 URINE CULTURE/COLONY COUNT: CPT

## 2023-05-11 PROCEDURE — 80143 DRUG ASSAY ACETAMINOPHEN: CPT | Performed by: EMERGENCY MEDICINE

## 2023-05-11 PROCEDURE — 80053 COMPREHEN METABOLIC PANEL: CPT | Performed by: EMERGENCY MEDICINE

## 2023-05-11 PROCEDURE — 81001 URINALYSIS AUTO W/SCOPE: CPT

## 2023-05-11 PROCEDURE — 87088 URINE BACTERIA CULTURE: CPT

## 2023-05-11 PROCEDURE — 80179 DRUG ASSAY SALICYLATE: CPT | Performed by: EMERGENCY MEDICINE

## 2023-05-11 RX ORDER — SODIUM CHLORIDE 0.9 % (FLUSH) 0.9 %
10 SYRINGE (ML) INJECTION AS NEEDED
Status: DISCONTINUED | OUTPATIENT
Start: 2023-05-11 | End: 2023-05-11 | Stop reason: HOSPADM

## 2023-05-11 RX ORDER — CEPHALEXIN 500 MG/1
500 CAPSULE ORAL 4 TIMES DAILY
Qty: 20 CAPSULE | Refills: 0 | Status: SHIPPED | OUTPATIENT
Start: 2023-05-11 | End: 2023-05-16

## 2023-05-11 RX ADMIN — SODIUM CHLORIDE 1000 ML: 9 INJECTION, SOLUTION INTRAVENOUS at 06:58

## 2023-05-11 NOTE — ED PROVIDER NOTES
Time: 6:40 AM EDT  Date of encounter:  2023  Independent Historian/Clinical History and Information was obtained by:   Patient  Chief Complaint   Patient presents with   • Dizziness     Per ems got called to patient's home for dizziness, and diabetic problems, states she has tremors on the left side, patient states she just used cocaine, has been on meth in the past and marijuana.        History is limited by: N/A    History of Present Illness:  Patient is a 27 y.o. year old female who presents to the emergency department for evaluation of tremors.  Patient states that she is staying [place] with some people that she does not feel safe around.  Patient also states that she was taking cocaine today.  States that does not her typical drug as she usually uses methamphetamine.  Denies seizure activity.  Denies suicidal ideation ([facility]ACIA)    HPI    Patient Care Team  Primary Care Provider: Provider, No Known    Past Medical History:     Allergies   Allergen Reactions   • Morphine Anaphylaxis   • Peanut Oil Anaphylaxis   • Rocephin [Ceftriaxone] Nausea And Vomiting   • Sulfa Antibiotics Anaphylaxis   • Topiramate Anaphylaxis   • Iodine Other (See Comments)   • Latex Hives   • Shellfish-Derived Products Rash     Past Medical History:   Diagnosis Date   • Borderline personality disorder    • Diabetes mellitus    • Endocarditis    • Endocarditis    • Hepatitis C    • Mood disorder    • PTSD (post-traumatic stress disorder)    • Seizures      Past Surgical History:   Procedure Laterality Date   • BACK SURGERY     •  SECTION       History reviewed. No pertinent family history.    Home Medications:  Prior to Admission medications    Medication Sig Start Date End Date Taking? Authorizing Provider   albuterol sulfate  (90 Base) MCG/ACT inhaler Inhale 2 puffs Every 4 (Four) Hours As Needed for Wheezing or Shortness of Air. 23   Marci Lopez APRN   hydrOXYzine pamoate (VISTARIL) 50 MG capsule Take 1  capsule by mouth 2 (Two) Times a Day As Needed for Anxiety. Indications: Feeling Anxious 8/16/21   Gutierrez Palma MD   levETIRAcetam (KEPPRA) 500 MG tablet Take 1 tablet by mouth Every 12 (Twelve) Hours. 6/16/21   Bob Bruce, DO   omeprazole (PrilOSEC) 20 MG capsule Take 1 capsule by mouth Daily. 6/16/21   Bob Bruce, DO   ondansetron ODT (ZOFRAN-ODT) 4 MG disintegrating tablet Place 1 tablet on the tongue Every 8 (Eight) Hours As Needed for Nausea. 8/17/21   Eyal Milan,    predniSONE (DELTASONE) 50 MG tablet Take 1 tablet by mouth Daily. 4/28/23   Marci Lopez APRN   traZODone (DESYREL) 100 MG tablet Take 1 tablet by mouth At Night As Needed for Sleep. Indications: Trouble Sleeping 8/16/21   Gutierrez Palma MD   valACYclovir (VALTREX) 1000 MG tablet Take 1 tablet by mouth Daily. Indications: Infection of the Skin and/or Soft Tissue 8/16/21   Gutierrez Palma MD   azithromycin (Zithromax Z-Camilo) 250 MG tablet Take 2 tablets by mouth on day 1, then 1 tablet daily on days 2-5 4/28/23 5/11/23  Marci Lopez APRN   cephalexin (KEFLEX) 500 MG capsule Take 1 capsule by mouth 2 (Two) Times a Day. 8/17/21 5/11/23  Eyal Milan DO   dicyclomine (BENTYL) 20 MG tablet Take 1 tablet by mouth Every 6 (Six) Hours. 4/26/23 5/11/23  Robinson Gates PA-C   escitalopram (LEXAPRO) 10 MG tablet Take 1 tablet by mouth Daily. Indications: Major Depressive Disorder 8/17/21 5/11/23  Gutierrez Palma MD   naproxen (EC NAPROSYN) 500 MG EC tablet Take 1 tablet by mouth 2 (Two) Times a Day With Meals. 8/17/21 5/11/23  Eyal Milan DO   nitrofurantoin, macrocrystal-monohydrate, (MACROBID) 100 MG capsule Take 1 capsule by mouth 2 (Two) Times a Day for 5 days. 5/6/23 5/11/23  Alecia Zheng APRN        Social History:   Social History     Tobacco Use   • Smoking status: Never   • Smokeless tobacco: Never   Vaping Use   • Vaping Use: Never used   Substance Use Topics   • Alcohol use: Not Currently   • Drug use: Yes     Types:  "IV, Methamphetamines, Marijuana         Review of Systems:  Review of Systems   Constitutional: Negative for chills and fever.   HENT: Negative for congestion, ear pain and sore throat.    Eyes: Negative for pain.   Respiratory: Negative for cough, chest tightness and shortness of breath.    Cardiovascular: Negative for chest pain.   Gastrointestinal: Negative for abdominal pain, diarrhea, nausea and vomiting.   Genitourinary: Negative for flank pain and hematuria.   Musculoskeletal: Negative for joint swelling.   Skin: Negative for pallor.   Neurological: Positive for dizziness and tremors. Negative for seizures and headaches.   All other systems reviewed and are negative.       Physical Exam:  /69   Pulse 91   Temp 97.9 °F (36.6 °C) (Oral)   Resp 18   Ht 160 cm (63\")   Wt 83.3 kg (183 lb 10.3 oz)   LMP 04/26/2023   SpO2 99%   BMI 32.53 kg/m²     Physical Exam  Vitals and nursing note reviewed.   Constitutional:       General: She is not in acute distress.     Appearance: Normal appearance. She is not toxic-appearing.   HENT:      Head: Normocephalic and atraumatic.      Mouth/Throat:      Mouth: Mucous membranes are moist.   Eyes:      General: No scleral icterus.     Pupils: Pupils are equal, round, and reactive to light.   Cardiovascular:      Rate and Rhythm: Normal rate and regular rhythm.      Pulses: Normal pulses.      Heart sounds: Normal heart sounds.   Pulmonary:      Effort: Pulmonary effort is normal. No respiratory distress.      Breath sounds: Normal breath sounds. No stridor. No wheezing.   Abdominal:      General: Abdomen is flat.      Palpations: Abdomen is soft.      Tenderness: There is no abdominal tenderness.   Musculoskeletal:         General: No swelling or tenderness. Normal range of motion.      Cervical back: Normal range of motion and neck supple.   Skin:     General: Skin is warm and dry.      Coloration: Skin is not jaundiced or pale.      Findings: No bruising, erythema, " lesion or rash.   Neurological:      Mental Status: She is alert and oriented to person, place, and time. Mental status is at baseline.      Cranial Nerves: No cranial nerve deficit.      Sensory: No sensory deficit.      Coordination: Coordination normal.   Psychiatric:         Mood and Affect: Mood normal.         Behavior: Behavior normal.         Thought Content: Thought content normal.         Judgment: Judgment normal.                  Procedures:  Procedures      Medical Decision Making:      Comorbidities that affect care:    Hepatitis C, mood disorder, seizures, endocarditis, PTSD, borderline personality disorder, diabetes, endocarditis, substance abuse    External Notes reviewed:    Previous ED Note: I have personally reviewed patient's previous medical encounters.      The following orders were placed and all results were independently analyzed by me:  Orders Placed This Encounter   Procedures   • [place] Draw   • Comprehensive Metabolic Panel   • Acetaminophen Level   • Ethanol   • Salicylate Level   • Urine Drug Screen - Urine, Clean Catch   • CBC Auto Differential   • Urinalysis With Microscopic If Indicated (No Culture) - Urine, Clean Catch   • Urinalysis, Microscopic Only - Urine, Clean Catch   • Urinalysis With Culture If Indicated -   • NPO Diet NPO Type: Strict NPO   • Cardiac Monitoring   • Continuous Pulse Oximetry   • Vital Signs   • Undress & Gown   • Psych / Access to See   • POC Glucose Once   • Insert Peripheral IV   • Suicide Precautions   • CBC & Differential   • Green Top (Gel)   • Lavender Top   • Gold Top - SST   • Light Blue Top       Medications Given in the Emergency Department:  Medications   sodium chloride 0.9 % flush 10 mL (has no administration in time range)   sodium chloride 0.9 % bolus 1,000 mL (0 mL Intravenous Stopped 5/11/23 0912)        ED Course:    The patient was initially evaluated in the triage area where orders were placed. The patient was later dispositioned by  [facility] Mercado.      The patient was advised to stay for completion of workup which includes but is not limited to communication of labs and radiological results, reassessment and plan. The patient was advised that leaving prior to disposition by a provider could result in critical findings that are not communicated to the patient.     ED Course as of 05/11/23 0918   Thu May 11, 2023   0754 Upon me entering the room patient was sleeping and appeared to be in no acute distress with equal rise and fall of chest.  She was able to be aroused without difficulty.    Pt encouraged to provide urine specimen.  Patient states she is afraid to walk because she is dizzy.  ER tech was asked to assist patient to restroom. [MS]      ED Course User Index  [MS] Alecia Zheng APRN       Labs:    Lab Results (last 24 hours)     Procedure Component Value Units Date/Time    CBC & Differential [[id number]]  (Abnormal) Collected: 05/11/23 0654    Specimen: Blood Updated: 05/11/23 0703    Narrative:      The following orders were created for panel order CBC & Differential.  Procedure                               Abnormality         Status                     ---------                               -----------         ------                     CBC Auto Differential[[id number]]        Abnormal            Final result                 Please view results for these tests on the individual orders.    Comprehensive Metabolic Panel [[id number]]  (Abnormal) Collected: 05/11/23 0654    Specimen: Blood Updated: 05/11/23 0721     Glucose 212 mg/dL      BUN 11 mg/dL      Creatinine 0.72 mg/dL      Sodium 138 mmol/L      Potassium 3.6 mmol/L      Comment: Slight hemolysis detected by analyzer. Results may be affected.        Chloride 101 mmol/L      CO2 24.2 mmol/L      Calcium 9.3 mg/dL      Total Protein 7.9 g/dL      Albumin 4.2 g/dL      ALT (SGPT) 21 U/L      AST (SGOT) 26 U/L      Comment: Slight hemolysis detected by analyzer.  Results may be affected.        Alkaline Phosphatase 90 U/L      Total Bilirubin 0.2 mg/dL      Globulin 3.7 gm/dL      A/G Ratio 1.1 g/dL      BUN/Creatinine Ratio 15.3     Anion Gap 12.8 mmol/L      eGFR 116.2 mL/min/1.73     Narrative:      GFR Normal >60  Chronic Kidney Disease <60  Kidney Failure <15      Acetaminophen Level [[id number]]  (Normal) Collected: 05/11/23 0654    Specimen: Blood Updated: 05/11/23 0721     Acetaminophen <5.0 mcg/mL     Ethanol [[id number]] Collected: 05/11/23 0654    Specimen: Blood Updated: 05/11/23 0721     Ethanol <10 mg/dL      Ethanol % <0.010 %     Narrative:      Ethanol (Plasma)  <10 Essentially Negative    Toxic Concentrations           mg/dL    Flushing, slowing of reflexes    Impaired visual activity         Depression of CNS              >100  Possible Coma                  >300       Salicylate Level [[id number]]  (Normal) Collected: 05/11/23 0654    Specimen: Blood Updated: 05/11/23 0721     Salicylate <0.3 mg/dL     CBC Auto Differential [[id number]]  (Abnormal) Collected: 05/11/23 0654    Specimen: Blood Updated: 05/11/23 0703     WBC 6.08 10*3/mm3      RBC 4.71 10*6/mm3      Hemoglobin 11.5 g/dL      Hematocrit 35.7 %      MCV 75.8 fL      MCH 24.4 pg      MCHC 32.2 g/dL      RDW 14.5 %      RDW-SD 39.3 fl      MPV 8.6 fL      Platelets 223 10*3/mm3      Neutrophil % 34.1 %      Lymphocyte % 54.1 %      Monocyte % 8.6 %      Eosinophil % 2.5 %      Basophil % 0.5 %      Immature Grans % 0.2 %      Neutrophils, Absolute 2.08 10*3/mm3      Lymphocytes, Absolute 3.29 10*3/mm3      Monocytes, Absolute 0.52 10*3/mm3      Eosinophils, Absolute 0.15 10*3/mm3      Basophils, Absolute 0.03 10*3/mm3      Immature Grans, Absolute 0.01 10*3/mm3      nRBC 0.0 /100 WBC     Urine Drug Screen - Urine, Clean Catch [[id number]]  (Abnormal) Collected: 05/11/23 0822    Specimen: Urine, Clean Catch Updated: 05/11/23 0856     Amphet/Methamphet, Screen Positive     Barbiturates  Screen, Urine Negative     Benzodiazepine Screen, Urine Negative     Cocaine Screen, Urine Positive     Opiate Screen Negative     THC, Screen, Urine Negative     Methadone Screen, Urine Negative     Oxycodone Screen, Urine Negative     Fentanyl, Urine Negative    Narrative:      Negative Thresholds Per Drugs Screened:    Amphetamines                 500 ng/ml  Barbiturates                 200 ng/ml  Benzodiazepines              100 ng/ml  Cocaine                      300 ng/ml  Methadone                    300 ng/ml  Opiates                      300 ng/ml  Oxycodone                    100 ng/ml  THC                           50 ng/ml  Fentanyl                       5 ng/ml      The Normal Value for all drugs tested is negative. This report includes final unconfirmed screening results to be used for medical treatment purposes only. Unconfirmed results must not be used for non-medical purposes such as employment or legal testing. Clinical consideration should be applied to any drug of abuse test, particularly when unconfirmed results are used.            Urinalysis With Microscopic If Indicated (No Culture) - Urine, Clean Catch [[id number]]  (Abnormal) Collected: 05/11/23 0822    Specimen: Urine, Clean Catch Updated: 05/11/23 0840     Color, UA Yellow     Appearance, UA Cloudy     pH, UA 5.5     Specific Gravity, UA >1.030     Glucose, UA >=1000 mg/dL (3+)     Ketones, UA Negative     Bilirubin, UA Negative     Blood, UA Negative     Protein, UA Negative     Leuk Esterase, UA Negative     Nitrite, UA Positive     Urobilinogen, UA 1.0 E.U./dL    Urinalysis, Microscopic Only - Urine, Clean Catch [[id number]]  (Abnormal) Collected: 05/11/23 0822    Specimen: Urine, Clean Catch Updated: 05/11/23 0840     RBC, UA 3-5 /HPF      WBC, UA 21-30 /HPF      Bacteria, UA 4+ /HPF      Squamous Epithelial Cells, UA 3-6 /HPF      Hyaline Casts, UA None Seen /LPF      Methodology Automated Microscopy           Imaging:    No Radiology  Exams Resulted Within Past 24 Hours      Differential Diagnosis and Discussion:      Dizziness: Based on the patient's history, signs, and symptoms, the diffential diagnosis includes but is not limited to meningitis, stroke, sepsis, subarachnoid hemorrhage, intracranial bleeding, encephalitis, vertigo, electrolyte imbalance, and metabolic disorders.    All labs were reviewed and interpreted by me.    MDM  Number of Diagnoses or Management Options  Acute cystitis without hematuria: new and does not require workup  Substance abuse: new and does not require workup     Amount and/or Complexity of Data Reviewed  Clinical lab tests: ordered and reviewed  Review and summarize past medical records: yes (I have personally reviewed patient's previous medical encounters.  )    Risk of Complications, Morbidity, and/or Mortality  Presenting problems: high  Diagnostic procedures: moderate  Management options: moderate    Patient Progress  Patient progress: stable           Patient Care Considerations:    CT CHEST: I considered ordering a CT scan of the chest, however Is not warranted at this time.  Patient has no recent TBI, and her symptoms are likely the result of substance abuse as well as current urinary tract infection.      Consultants/Shared Management Plan:    None    Social Determinants of Health:    Patient is independent, reliable, and has access to care.       Disposition and Care Coordination:    Discharged: The patient is suitable and stable for discharge with no need for consideration of observation or admission.    I have explained the patient´s condition, diagnoses and treatment plan based on the information available to me at this time. I have answered questions and addressed any concerns. The patient has a good  understanding of the patient´s diagnosis, condition, and treatment plan as can be expected at this point. The vital signs have been stable. The patient´s condition is stable and appropriate for  discharge from the emergency department.      The patient will pursue further outpatient evaluation with the primary care physician or other designated or consulting physician as outlined in the discharge instructions. They are agreeable to this plan of care and follow-up instructions have been explained in detail. The patient has received these instructions in written format and have expressed an understanding of the discharge instructions. The patient is aware that any significant change in condition or worsening of symptoms should prompt an immediate return to this or the closest emergency department or call to 911.    Final diagnoses:   Acute cystitis without hematuria   Substance abuse        ED Disposition     ED Disposition   Discharge    Condition   Stable    Comment   --             This medical record created using voice recognition software.           Alecia Zheng, APRN  05/11/23 0904

## 2023-05-11 NOTE — DISCHARGE INSTRUCTIONS
Your urine showed that you had a urinary tract infection.  Please take all the antibiotic that has been prescribed you today until it is gone or unless you are directed otherwise.  It is also important that you increase your oral fluid intake particularly water.  Also, refrain from using drugs.  The drugs that you have in your system in addition to the urinary tract infection is likely the cause for your dizziness.  If you continue to have symptoms please follow-up with your primary care provider.  If it anytime you become unable to urinate, develop a fever that cannot be controlled with Tylenol or Motrin, or severe nausea, vomiting, or diarrhea please return to the emergency department otherwise follow-up with your primary care provider

## 2023-05-13 LAB — BACTERIA SPEC AEROBE CULT: ABNORMAL

## 2023-05-27 ENCOUNTER — HOSPITAL ENCOUNTER (EMERGENCY)
Facility: HOSPITAL | Age: 28
Discharge: HOME | End: 2023-05-27
Attending: EMERGENCY MEDICINE
Payer: MEDICAID

## 2023-05-27 VITALS
OXYGEN SATURATION: 92 % | HEART RATE: 90 BPM | WEIGHT: 170 LBS | SYSTOLIC BLOOD PRESSURE: 119 MMHG | TEMPERATURE: 98.9 F | BODY MASS INDEX: 30.12 KG/M2 | DIASTOLIC BLOOD PRESSURE: 69 MMHG | HEIGHT: 63 IN | RESPIRATION RATE: 20 BRPM

## 2023-05-27 DIAGNOSIS — Z59.00 HOMELESSNESS: Primary | ICD-10-CM

## 2023-05-27 DIAGNOSIS — F19.10 SUBSTANCE ABUSE: ICD-10-CM

## 2023-05-27 DIAGNOSIS — G25.2 COARSE TREMORS: ICD-10-CM

## 2023-05-27 LAB
ALBUMIN SERPL-MCNC: 3.6 G/DL (ref 3.5–5.2)
ALBUMIN/GLOB SERPL: 1 G/DL
ALP SERPL-CCNC: 83 U/L (ref 39–117)
ALT SERPL W P-5'-P-CCNC: 21 U/L (ref 1–33)
AMPHET+METHAMPHET UR QL: POSITIVE
ANION GAP SERPL CALCULATED.3IONS-SCNC: 10.6 MMOL/L (ref 5–15)
APAP SERPL-MCNC: <5 MCG/ML (ref 0–30)
AST SERPL-CCNC: 19 U/L (ref 1–32)
BACTERIA UR QL AUTO: ABNORMAL /HPF
BARBITURATES UR QL SCN: NEGATIVE
BASOPHILS # BLD AUTO: 0.02 10*3/MM3 (ref 0–0.2)
BASOPHILS NFR BLD AUTO: 0.4 % (ref 0–1.5)
BENZODIAZ UR QL SCN: NEGATIVE
BILIRUB SERPL-MCNC: 0.4 MG/DL (ref 0–1.2)
BILIRUB UR QL STRIP: ABNORMAL
BUN SERPL-MCNC: 13 MG/DL (ref 6–20)
BUN/CREAT SERPL: 20 (ref 7–25)
CALCIUM SPEC-SCNC: 8.7 MG/DL (ref 8.6–10.5)
CANNABINOIDS SERPL QL: NEGATIVE
CHLORIDE SERPL-SCNC: 102 MMOL/L (ref 98–107)
CLARITY UR: ABNORMAL
CO2 SERPL-SCNC: 24.4 MMOL/L (ref 22–29)
COCAINE UR QL: NEGATIVE
COLOR UR: ABNORMAL
CREAT SERPL-MCNC: 0.65 MG/DL (ref 0.57–1)
DEPRECATED RDW RBC AUTO: 39.1 FL (ref 37–54)
EGFRCR SERPLBLD CKD-EPI 2021: 122.4 ML/MIN/1.73
EOSINOPHIL # BLD AUTO: 0.11 10*3/MM3 (ref 0–0.4)
EOSINOPHIL NFR BLD AUTO: 2.4 % (ref 0.3–6.2)
ERYTHROCYTE [DISTWIDTH] IN BLOOD BY AUTOMATED COUNT: 14.4 % (ref 12.3–15.4)
ETHANOL BLD-MCNC: <10 MG/DL (ref 0–10)
ETHANOL UR QL: <0.01 %
FENTANYL UR-MCNC: NEGATIVE NG/ML
GLOBULIN UR ELPH-MCNC: 3.5 GM/DL
GLUCOSE SERPL-MCNC: 115 MG/DL (ref 65–99)
GLUCOSE UR STRIP-MCNC: ABNORMAL MG/DL
HCG INTACT+B SERPL-ACNC: <0.5 MIU/ML
HCT VFR BLD AUTO: 35.8 % (ref 34–46.6)
HGB BLD-MCNC: 11.4 G/DL (ref 12–15.9)
HGB UR QL STRIP.AUTO: ABNORMAL
HOLD SPECIMEN: NORMAL
HOLD SPECIMEN: NORMAL
HYALINE CASTS UR QL AUTO: ABNORMAL /LPF
IMM GRANULOCYTES # BLD AUTO: 0.01 10*3/MM3 (ref 0–0.05)
IMM GRANULOCYTES NFR BLD AUTO: 0.2 % (ref 0–0.5)
KETONES UR QL STRIP: ABNORMAL
LEUKOCYTE ESTERASE UR QL STRIP.AUTO: ABNORMAL
LYMPHOCYTES # BLD AUTO: 2.17 10*3/MM3 (ref 0.7–3.1)
LYMPHOCYTES NFR BLD AUTO: 47.9 % (ref 19.6–45.3)
MCH RBC QN AUTO: 24.2 PG (ref 26.6–33)
MCHC RBC AUTO-ENTMCNC: 31.8 G/DL (ref 31.5–35.7)
MCV RBC AUTO: 75.8 FL (ref 79–97)
METHADONE UR QL SCN: NEGATIVE
MONOCYTES # BLD AUTO: 0.33 10*3/MM3 (ref 0.1–0.9)
MONOCYTES NFR BLD AUTO: 7.3 % (ref 5–12)
NEUTROPHILS NFR BLD AUTO: 1.89 10*3/MM3 (ref 1.7–7)
NEUTROPHILS NFR BLD AUTO: 41.8 % (ref 42.7–76)
NITRITE UR QL STRIP: ABNORMAL
NRBC BLD AUTO-RTO: 0 /100 WBC (ref 0–0.2)
OPIATES UR QL: NEGATIVE
OXYCODONE UR QL SCN: NEGATIVE
PH UR STRIP.AUTO: ABNORMAL [PH]
PLATELET # BLD AUTO: 212 10*3/MM3 (ref 140–450)
PMV BLD AUTO: 8.5 FL (ref 6–12)
POTASSIUM SERPL-SCNC: 4.1 MMOL/L (ref 3.5–5.2)
PROT SERPL-MCNC: 7.1 G/DL (ref 6–8.5)
PROT UR QL STRIP: ABNORMAL
RBC # BLD AUTO: 4.72 10*6/MM3 (ref 3.77–5.28)
RBC # UR STRIP: ABNORMAL /HPF
REF LAB TEST METHOD: ABNORMAL
SALICYLATES SERPL-MCNC: <0.3 MG/DL
SODIUM SERPL-SCNC: 137 MMOL/L (ref 136–145)
SP GR UR STRIP: 1.01 (ref 1–1.03)
SQUAMOUS #/AREA URNS HPF: ABNORMAL /HPF
T4 FREE SERPL-MCNC: 1.42 NG/DL (ref 0.93–1.7)
TSH SERPL DL<=0.05 MIU/L-ACNC: 0.47 UIU/ML (ref 0.27–4.2)
UROBILINOGEN UR QL STRIP: ABNORMAL
WBC # UR STRIP: ABNORMAL /HPF
WBC NRBC COR # BLD: 4.53 10*3/MM3 (ref 3.4–10.8)
WHOLE BLOOD HOLD COAG: NORMAL
WHOLE BLOOD HOLD SPECIMEN: NORMAL

## 2023-05-27 PROCEDURE — 80307 DRUG TEST PRSMV CHEM ANLYZR: CPT | Performed by: EMERGENCY MEDICINE

## 2023-05-27 PROCEDURE — 84439 ASSAY OF FREE THYROXINE: CPT | Performed by: EMERGENCY MEDICINE

## 2023-05-27 PROCEDURE — 84702 CHORIONIC GONADOTROPIN TEST: CPT | Performed by: EMERGENCY MEDICINE

## 2023-05-27 PROCEDURE — 87186 SC STD MICRODIL/AGAR DIL: CPT

## 2023-05-27 PROCEDURE — 80053 COMPREHEN METABOLIC PANEL: CPT | Performed by: EMERGENCY MEDICINE

## 2023-05-27 PROCEDURE — 81001 URINALYSIS AUTO W/SCOPE: CPT

## 2023-05-27 PROCEDURE — 85025 COMPLETE CBC W/AUTO DIFF WBC: CPT | Performed by: EMERGENCY MEDICINE

## 2023-05-27 PROCEDURE — 84443 ASSAY THYROID STIM HORMONE: CPT | Performed by: EMERGENCY MEDICINE

## 2023-05-27 PROCEDURE — 80179 DRUG ASSAY SALICYLATE: CPT | Performed by: EMERGENCY MEDICINE

## 2023-05-27 PROCEDURE — 99284 EMERGENCY DEPT VISIT MOD MDM: CPT

## 2023-05-27 PROCEDURE — 80143 DRUG ASSAY ACETAMINOPHEN: CPT | Performed by: EMERGENCY MEDICINE

## 2023-05-27 PROCEDURE — 87086 URINE CULTURE/COLONY COUNT: CPT

## 2023-05-27 PROCEDURE — 87077 CULTURE AEROBIC IDENTIFY: CPT

## 2023-05-27 PROCEDURE — 82077 ASSAY SPEC XCP UR&BREATH IA: CPT | Performed by: EMERGENCY MEDICINE

## 2023-05-27 PROCEDURE — 36415 COLL VENOUS BLD VENIPUNCTURE: CPT

## 2023-05-27 RX ORDER — ACETAMINOPHEN 325 MG/1
975 TABLET ORAL ONCE
Status: COMPLETED | OUTPATIENT
Start: 2023-05-27 | End: 2023-05-27

## 2023-05-27 RX ORDER — LEVETIRACETAM 500 MG/1
500 TABLET ORAL EVERY 12 HOURS SCHEDULED
Qty: 60 TABLET | Refills: 0 | Status: SHIPPED | OUTPATIENT
Start: 2023-05-27 | End: 2023-05-29

## 2023-05-27 RX ORDER — LEVETIRACETAM 500 MG/1
500 TABLET ORAL ONCE
Status: COMPLETED | OUTPATIENT
Start: 2023-05-27 | End: 2023-05-27

## 2023-05-27 RX ADMIN — ACETAMINOPHEN 975 MG: 325 TABLET ORAL at 06:18

## 2023-05-27 RX ADMIN — LEVETIRACETAM 500 MG: 500 TABLET, FILM COATED ORAL at 06:18

## 2023-05-27 SDOH — ECONOMIC STABILITY - HOUSING INSECURITY: HOMELESSNESS UNSPECIFIED: Z59.00

## 2023-05-27 NOTE — DISCHARGE INSTRUCTIONS
[facility]  Please start taking your keppra as prescribed

## 2023-05-27 NOTE — SIGNIFICANT NOTE
05/27/23 1231   Plan   Plan Comments [facility] contacted [facility] to inform that patient does have a bed available and has been accepted back to facility. SW informed patient and encouraged patient to be on best behavior as patient did have issues with other clients at facility while there last. SW also informed patient that if she were to leave the property again she will be discharged and to make sure she stays on site of facility unless told otherwise. Pt to be discharged to [facility] and facility will be arranging transportation.   Final Discharge Disposition Code 62 - inpatient rehab facility

## 2023-05-27 NOTE — ED PROVIDER NOTES
Time: 5:42 AM EDT  Date of encounter:  5/27/2023  Independent Historian/Clinical History and Information was obtained by:   Patient  Chief Complaint: drug addiction    History is limited by: N/A    History of Present Illness:  Patient is a 27 y.o. year old female who presents to the emergency department for evaluation of     The patient presents to the emergency department and states that she has a history of seizures and states that she is having hand tremors to her left hand.  She was picked up by EMS after the police called after her being found at a car lot in [place].  Patient states that she has a history of seizures but has not been on her Keppra for an undisclosed amount of time.  She states she cannot remember when she took it last.  She states that she has a drug addiction problem and uses methamphetamines on a regular basis and today that she is wanting to go to a rehab.  She states that she was at the [facility] house about a month ago but only was there for 2 days.  She states the last time she was in any type of rehab or detox before that had been years.  She states that she believes that she may have had a seizure on Thursday she states she does not remember going to sleep or waking up and states that she was using drugs that day but normally she is able to remember things.  Patient also has some very distinct finger movements on her left hand.  This does not not seem involuntary.  Seems very purposeful.  The patient does seem to be able to control the tremor when she is using that arm or having a conversation.  She denies any recent falls or head trauma.  She is alert and oriented.  She states that she is homeless.      History provided by:  Patient   used: No        Patient Care Team  Primary Care Provider: Provider, No Known    Past Medical History:     Allergies   Allergen Reactions   • Morphine Anaphylaxis   • Peanut Oil Anaphylaxis   • Rocephin [Ceftriaxone] Nausea And  Vomiting   • Sulfa Antibiotics Anaphylaxis   • Topiramate Anaphylaxis   • Iodine Other (See Comments)   • Latex Hives   • Shellfish-Derived Products Rash     Past Medical History:   Diagnosis Date   • Borderline personality disorder    • Diabetes mellitus    • Endocarditis    • Endocarditis    • Hepatitis C    • Mood disorder    • PTSD (post-traumatic stress disorder)    • Seizures      Past Surgical History:   Procedure Laterality Date   • BACK SURGERY     •  SECTION       No family history on file.    Home Medications:  Prior to Admission medications    Medication Sig Start Date End Date Taking? Authorizing Provider   albuterol sulfate  (90 Base) MCG/ACT inhaler Inhale 2 puffs Every 4 (Four) Hours As Needed for Wheezing or Shortness of Air. 23   Marci Lopez APRN   hydrOXYzine pamoate (VISTARIL) 50 MG capsule Take 1 capsule by mouth 2 (Two) Times a Day As Needed for Anxiety. Indications: Feeling Anxious 21   Gutierrez Palma MD   levETIRAcetam (KEPPRA) 500 MG tablet Take 1 tablet by mouth Every 12 (Twelve) Hours. 21   Bob Bruce,    omeprazole (PrilOSEC) 20 MG capsule Take 1 capsule by mouth Daily. 21   Bob Bruce,    ondansetron ODT (ZOFRAN-ODT) 4 MG disintegrating tablet Place 1 tablet on the tongue Every 8 (Eight) Hours As Needed for Nausea. 21   Eyal Milan,    predniSONE (DELTASONE) 50 MG tablet Take 1 tablet by mouth Daily. 23   Marci Lopez APRN   traZODone (DESYREL) 100 MG tablet Take 1 tablet by mouth At Night As Needed for Sleep. Indications: Trouble Sleeping 21   Gutierrez Palma MD   valACYclovir (VALTREX) 1000 MG tablet Take 1 tablet by mouth Daily. Indications: Infection of the Skin and/or Soft Tissue 21   Gutierrez Palma MD        Social History:   Social History     Tobacco Use   • Smoking status: Never   • Smokeless tobacco: Never   Vaping Use   • Vaping Use: Never used   Substance Use Topics   • Alcohol use: Not Currently   • Drug  "use: Yes     Types: IV, Methamphetamines, Marijuana         Review of Systems:  Review of Systems   Constitutional: Negative for chills and fever.   HENT: Negative for congestion, ear pain and sore throat.    Eyes: Negative for pain.   Respiratory: Negative for cough, chest tightness and shortness of breath.    Cardiovascular: Negative for chest pain.   Gastrointestinal: Negative for abdominal pain, diarrhea, nausea and vomiting.   Genitourinary: Negative for dysuria, flank pain, hematuria and urgency.   Musculoskeletal: Negative for back pain, joint swelling and neck pain.   Skin: Negative for pallor.   Neurological: Positive for tremors (PT REPORTS LEFT HAND TREMORSZ). Negative for seizures and headaches.   Psychiatric/Behavioral: Positive for confusion. Negative for hallucinations, self-injury and suicidal ideas. The patient is nervous/anxious.    All other systems reviewed and are negative.       Physical Exam:  /69   Pulse 90   Temp 98.9 °F (37.2 °C) (Oral)   Resp 20   Ht 160 cm (63\")   Wt 77.1 kg (170 lb)   LMP 05/27/2023 (Exact Date)   SpO2 92%   BMI 30.11 kg/m²     Physical Exam  Vitals and nursing note reviewed.   Constitutional:       General: She is not in acute distress.     Appearance: Normal appearance. She is not ill-appearing or toxic-appearing.   HENT:      Head: Normocephalic and atraumatic.   Eyes:      General: No scleral icterus.     Conjunctiva/sclera: Conjunctivae normal.      Pupils: Pupils are equal, round, and reactive to light.   Cardiovascular:      Rate and Rhythm: Normal rate and regular rhythm.      Pulses: Normal pulses.   Pulmonary:      Effort: Pulmonary effort is normal. No respiratory distress.      Breath sounds: Normal breath sounds. No wheezing.   Abdominal:      General: Abdomen is flat.      Palpations: Abdomen is soft.      Tenderness: There is no abdominal tenderness. There is no guarding or rebound.   Musculoskeletal:         General: Normal range of motion. "      Cervical back: Normal range of motion.   Skin:     General: Skin is warm and dry.      Capillary Refill: Capillary refill takes less than 2 seconds.      Findings: No bruising, erythema or rash.   Neurological:      General: No focal deficit present.      Mental Status: She is alert and oriented to person, place, and time. Mental status is at baseline.   Psychiatric:         Mood and Affect: Mood normal.         Behavior: Behavior normal.                  Procedures:  Procedures      Medical Decision Making:      Comorbidities that affect care:    Diabetes    External Notes reviewed:    None      The following orders were placed and all results were independently analyzed by me:  Orders Placed This Encounter   Procedures   • Urine Culture - Urine,   • Urinalysis With Culture If Indicated - Urine, Clean Catch   • Urinalysis, Microscopic Only - Urine, Clean Catch   • [place] Draw   • Comprehensive Metabolic Panel   • hCG, Quantitative, Pregnancy   • Acetaminophen Level   • Ethanol   • Urine Drug Screen - Urine, Clean Catch   • Salicylate Level   • T4, Free   • TSH   • CBC Auto Differential   • Diet: Regular/House Diet; Texture: Regular Texture (IDDSI 7); Fluid Consistency: Thin (IDDSI 0)   • PLEASE FORWARD PT INFO TO [employer] AND [employer] FOR EVAL.  Nursing Communication   • CBC & Differential   • Green Top (Gel)   • Lavender Top   • Gold Top - SST   • Light Blue Top       Medications Given in the Emergency Department:  Medications   acetaminophen (TYLENOL) tablet 975 mg (975 mg Oral Given 5/27/23 0618)   levETIRAcetam (KEPPRA) tablet 500 mg (500 mg Oral Given 5/27/23 0618)        ED Course:    ED Course as of 05/27/23 1240   Sat May 27, 2023   0934 SW contacting [facility] for acceptance  [AJ]   1214 SW states the [facility] did not accept the patient however they will call [facility] who will accept patient back. Pt states she is [facility].  [AJ]   1231 Pt is accepted  at the [facility], they are on their way to pick her up. [AJ]      ED Course User Index  [AJ] Edvin Dolan PA-C       Labs:    Lab Results (last 24 hours)     Procedure Component Value Units Date/Time    Urinalysis With Culture If Indicated - Urine, Clean Catch [[id number]]  (Abnormal) Collected: 05/27/23 0232    Specimen: Urine, Clean Catch Updated: 05/27/23 0243     Color, UA Red     Appearance, UA Turbid     pH, UA --     Comment: Unable to result due to color interference         Specific Gravity, UA 1.015     Glucose, UA --     Comment: Unable to result due to color interference         Ketones, UA --     Comment: Unable to result due to color interference         Bilirubin, UA --     Comment: Unable to result due to color interference         Blood, UA --     Comment: Unable to result due to color interference         Protein, UA --     Comment: Unable to result due to color interference         Leuk Esterase, UA --     Comment: Unable to result due to color interference         Nitrite, UA --     Comment: Unable to result due to color interference         Urobilinogen, UA --     Comment: Unable to result due to color interference        Narrative:      In absence of clinical symptoms, the presence of pyuria, bacteria, and/or nitrites on the urinalysis result does not correlate with infection.    Urinalysis, Microscopic Only - Urine, Clean Catch [[id number]]  (Abnormal) Collected: 05/27/23 0232    Specimen: Urine, Clean Catch Updated: 05/27/23 0254     RBC, UA Too Numerous to Count /HPF      WBC, UA 13-20 /HPF      Bacteria, UA 3+ /HPF      Squamous Epithelial Cells, UA 7-12 /HPF      Hyaline Casts, UA None Seen /LPF      Methodology Manual Light Microscopy    Urine Culture - Urine, Urine, Clean Catch [[id number]] Collected: 05/27/23 0232    Specimen: Urine, Clean Catch Updated: 05/27/23 0254    Urine Drug Screen - Urine, Clean Catch [[id number]]  (Abnormal) Collected: 05/27/23 0232    Specimen:  Urine, Clean Catch Updated: 05/27/23 0514     Amphet/Methamphet, Screen Positive     Barbiturates Screen, Urine Negative     Benzodiazepine Screen, Urine Negative     Cocaine Screen, Urine Negative     Opiate Screen Negative     THC, Screen, Urine Negative     Methadone Screen, Urine Negative     Oxycodone Screen, Urine Negative     Fentanyl, Urine Negative    Narrative:      Negative Thresholds Per Drugs Screened:    Amphetamines                 500 ng/ml  Barbiturates                 200 ng/ml  Benzodiazepines              100 ng/ml  Cocaine                      300 ng/ml  Methadone                    300 ng/ml  Opiates                      300 ng/ml  Oxycodone                    100 ng/ml  THC                           50 ng/ml  Fentanyl                       5 ng/ml      The Normal Value for all drugs tested is negative. This report includes final unconfirmed screening results to be used for medical treatment purposes only. Unconfirmed results must not be used for non-medical purposes such as employment or legal testing. Clinical consideration should be applied to any drug of abuse test, particularly when unconfirmed results are used.            CBC & Differential [[id number]]  (Abnormal) Collected: 05/27/23 0526    Specimen: Blood Updated: 05/27/23 0534    Narrative:      The following orders were created for panel order CBC & Differential.  Procedure                               Abnormality         Status                     ---------                               -----------         ------                     CBC Auto Differential[[id number]]        Abnormal            Final result                 Please view results for these tests on the individual orders.    Comprehensive Metabolic Panel [[id number]]  (Abnormal) Collected: 05/27/23 0526    Specimen: Blood Updated: 05/27/23 0605     Glucose 115 mg/dL      BUN 13 mg/dL      Creatinine 0.65 mg/dL      Sodium 137 mmol/L      Potassium 4.1 mmol/L      Chloride  102 mmol/L      CO2 24.4 mmol/L      Calcium 8.7 mg/dL      Total Protein 7.1 g/dL      Albumin 3.6 g/dL      ALT (SGPT) 21 U/L      AST (SGOT) 19 U/L      Alkaline Phosphatase 83 U/L      Total Bilirubin 0.4 mg/dL      Globulin 3.5 gm/dL      A/G Ratio 1.0 g/dL      BUN/Creatinine Ratio 20.0     Anion Gap 10.6 mmol/L      eGFR 122.4 mL/min/1.73     Narrative:      GFR Normal >60  Chronic Kidney Disease <60  Kidney Failure <15      hCG, Quantitative, Pregnancy [[id number]] Collected: 05/27/23 0526    Specimen: Blood Updated: 05/27/23 0601     HCG Quantitative <0.50 mIU/mL     Narrative:      HCG Ranges by Gestational Age    Females - non-pregnant premenopausal   </= 1mIU/mL HCG  Females - postmenopausal               </= 7mIU/mL HCG    3 Weeks       5.4   -      72 mIU/mL  4 Weeks      10.2   -     708 mIU/mL  5 Weeks       217   -   8,245 mIU/mL  6 Weeks       152   -  32,177 mIU/mL  7 Weeks     4,059   - 153,767 mIU/mL  8 Weeks    31,366   - 149,094 mIU/mL  9 Weeks    59,109   - 135,901 mIU/mL  10 Weeks   44,186   - 170,409 mIU/mL  12 Weeks   27,107   - 201,615 mIU/mL  14 Weeks   24,302   -  93,646 mIU/mL  15 Weeks   12,540   -  69,747 mIU/mL  16 Weeks    8,904   -  55,332 mIU/mL  17 Weeks    8,240   -  51,793 mIU/mL  18 Weeks    9,649   -  55,271 mIU/mL      Acetaminophen Level [[id number]]  (Normal) Collected: 05/27/23 0526    Specimen: Blood Updated: 05/27/23 0605     Acetaminophen <5.0 mcg/mL     Ethanol [[id number]] Collected: 05/27/23 0526    Specimen: Blood Updated: 05/27/23 0605     Ethanol <10 mg/dL      Ethanol % <0.010 %     Narrative:      Ethanol (Plasma)  <10 Essentially Negative    Toxic Concentrations           mg/dL    Flushing, slowing of reflexes    Impaired visual activity         Depression of CNS              >100  Possible Coma                  >300       Salicylate Level [[id number]]  (Normal) Collected: 05/27/23 0526    Specimen: Blood Updated: 05/27/23 0605     Salicylate <0.3  mg/dL     T4, Free [[id number]]  (Normal) Collected: 05/27/23 0526    Specimen: Blood Updated: 05/27/23 0609     Free T4 1.42 ng/dL     Narrative:      Results may be falsely increased if patient taking Biotin.      TSH [[id number]]  (Normal) Collected: 05/27/23 0526    Specimen: Blood Updated: 05/27/23 0608     TSH 0.469 uIU/mL     CBC Auto Differential [[id number]]  (Abnormal) Collected: 05/27/23 0526    Specimen: Blood Updated: 05/27/23 0534     WBC 4.53 10*3/mm3      RBC 4.72 10*6/mm3      Hemoglobin 11.4 g/dL      Hematocrit 35.8 %      MCV 75.8 fL      MCH 24.2 pg      MCHC 31.8 g/dL      RDW 14.4 %      RDW-SD 39.1 fl      MPV 8.5 fL      Platelets 212 10*3/mm3      Neutrophil % 41.8 %      Lymphocyte % 47.9 %      Monocyte % 7.3 %      Eosinophil % 2.4 %      Basophil % 0.4 %      Immature Grans % 0.2 %      Neutrophils, Absolute 1.89 10*3/mm3      Lymphocytes, Absolute 2.17 10*3/mm3      Monocytes, Absolute 0.33 10*3/mm3      Eosinophils, Absolute 0.11 10*3/mm3      Basophils, Absolute 0.02 10*3/mm3      Immature Grans, Absolute 0.01 10*3/mm3      nRBC 0.0 /100 WBC            Imaging:    No Radiology Exams Resulted Within Past 24 Hours      Differential Diagnosis and Discussion:    Psychiatric: Differential diagnosis includes but is not limited to depression, psychosis, bipolar disorder, anxiety, manic episode, schizophrenia, and substance abuse.    All labs were reviewed and interpreted by me.    [facility]     Patient Care Considerations:    Consultants/Shared Management Plan:    Consultant: I have discussed the case with CHHAYA who states patient has acceptance at [facility]    Social Determinants of Health:    Patient is independent, reliable, and has access to care.       Disposition and Care Coordination:    Discharged: The patient is suitable and stable for discharge with no need for consideration of observation or admission.    I have explained the patient´s condition, diagnoses and treatment plan based on  the information available to me at this time. I have answered questions and addressed any concerns. The patient has a good  understanding of the patient´s diagnosis, condition, and treatment plan as can be expected at this point. The vital signs have been stable. The patient´s condition is stable and appropriate for discharge from the emergency department.      The patient will pursue further outpatient evaluation with the primary care physician or other designated or consulting physician as outlined in the discharge instructions. They are agreeable to this plan of care and follow-up instructions have been explained in detail. The patient has received these instructions in written format and have expressed an understanding of the discharge instructions. The patient is aware that any significant change in condition or worsening of symptoms should prompt an immediate return to this or the closest emergency department or call to 911.  I have explained discharge medications and the need for follow up with the patient/caretakers. This was also printed in the discharge instructions. Patient was discharged with the following medications and follow up:      Where to Get Your Medications      These medications were sent to [facility]/pharmacy #[id number] - [place] - [address] - [phone] [facility] [phone]   [address] [place] [address]    Hours: 24-hours Phone: [phone]   · levETIRAcetam 500 MG tablet        Medication List      No changes were made to your prescriptions during this visit.      Provider, No Known  [facility] [place] [address]             Final diagnoses:   Homelessness   Substance abuse   Coarse tremors        ED Disposition     ED Disposition   Discharge    Condition   Stable    Comment   --             This medical record created using voice recognition software.           Edvin Dolan PA-C  05/27/23 0594

## 2023-05-27 NOTE — ED NOTES
Patient is homeless, was at [employer] and flagged down [employer] who called EMS, patient is non compliant with home medications is suppose to be on Keppra for seizures, patient states substance abuse issues with methamphetamines and states has not used in a couple of days. Patient AAOx4 on arrival to ED, no seizure activity noted at this time, patient able to stand up from stretcher to wheelchair and answer questions asked for triage

## 2023-05-27 NOTE — SIGNIFICANT NOTE
05/27/23 8387   Plan   Plan Comments SW met with patient to discuss options for inpatient substance use treatment. Patient had requested to go to [facility]. However, SW spoke with Christine Hilton at [facility] and was informed that they do not take patient's insurance. Pt was previously at [facility] but left AMA. Pt was agreeable to go back to [facility]. Patient completed over the phone assessment. SW to await for call back from dax Butterfield of acceptance to facility.

## 2023-05-28 VITALS
HEIGHT: 63 IN | RESPIRATION RATE: 19 BRPM | OXYGEN SATURATION: 97 % | SYSTOLIC BLOOD PRESSURE: 128 MMHG | DIASTOLIC BLOOD PRESSURE: 95 MMHG | TEMPERATURE: 99 F | WEIGHT: 180.12 LBS | BODY MASS INDEX: 31.91 KG/M2 | HEART RATE: 107 BPM

## 2023-05-28 LAB
ALBUMIN SERPL-MCNC: 4.1 G/DL (ref 3.5–5.2)
ALBUMIN/GLOB SERPL: 1.1 G/DL
ALP SERPL-CCNC: 83 U/L (ref 39–117)
ALT SERPL W P-5'-P-CCNC: 17 U/L (ref 1–33)
ANION GAP SERPL CALCULATED.3IONS-SCNC: 12.5 MMOL/L (ref 5–15)
AST SERPL-CCNC: 12 U/L (ref 1–32)
BASOPHILS # BLD AUTO: 0.02 10*3/MM3 (ref 0–0.2)
BASOPHILS NFR BLD AUTO: 0.4 % (ref 0–1.5)
BILIRUB SERPL-MCNC: 0.2 MG/DL (ref 0–1.2)
BILIRUB UR QL STRIP: NEGATIVE
BUN SERPL-MCNC: 11 MG/DL (ref 6–20)
BUN/CREAT SERPL: 15.3 (ref 7–25)
CALCIUM SPEC-SCNC: 9.5 MG/DL (ref 8.6–10.5)
CHLORIDE SERPL-SCNC: 101 MMOL/L (ref 98–107)
CLARITY UR: ABNORMAL
CO2 SERPL-SCNC: 20.5 MMOL/L (ref 22–29)
COLOR UR: YELLOW
CREAT SERPL-MCNC: 0.72 MG/DL (ref 0.57–1)
DEPRECATED RDW RBC AUTO: 38.3 FL (ref 37–54)
EGFRCR SERPLBLD CKD-EPI 2021: 116.2 ML/MIN/1.73
EOSINOPHIL # BLD AUTO: 0.1 10*3/MM3 (ref 0–0.4)
EOSINOPHIL NFR BLD AUTO: 1.9 % (ref 0.3–6.2)
ERYTHROCYTE [DISTWIDTH] IN BLOOD BY AUTOMATED COUNT: 14.6 % (ref 12.3–15.4)
GLOBULIN UR ELPH-MCNC: 3.7 GM/DL
GLUCOSE SERPL-MCNC: 362 MG/DL (ref 65–99)
GLUCOSE UR STRIP-MCNC: ABNORMAL MG/DL
HCT VFR BLD AUTO: 39.5 % (ref 34–46.6)
HGB BLD-MCNC: 12.8 G/DL (ref 12–15.9)
HGB UR QL STRIP.AUTO: ABNORMAL
IMM GRANULOCYTES # BLD AUTO: 0.01 10*3/MM3 (ref 0–0.05)
IMM GRANULOCYTES NFR BLD AUTO: 0.2 % (ref 0–0.5)
KETONES UR QL STRIP: NEGATIVE
LEUKOCYTE ESTERASE UR QL STRIP.AUTO: NEGATIVE
LIPASE SERPL-CCNC: 46 U/L (ref 13–60)
LYMPHOCYTES # BLD AUTO: 2.52 10*3/MM3 (ref 0.7–3.1)
LYMPHOCYTES NFR BLD AUTO: 46.8 % (ref 19.6–45.3)
MCH RBC QN AUTO: 24.1 PG (ref 26.6–33)
MCHC RBC AUTO-ENTMCNC: 32.4 G/DL (ref 31.5–35.7)
MCV RBC AUTO: 74.4 FL (ref 79–97)
MONOCYTES # BLD AUTO: 0.34 10*3/MM3 (ref 0.1–0.9)
MONOCYTES NFR BLD AUTO: 6.3 % (ref 5–12)
NEUTROPHILS NFR BLD AUTO: 2.4 10*3/MM3 (ref 1.7–7)
NEUTROPHILS NFR BLD AUTO: 44.4 % (ref 42.7–76)
NITRITE UR QL STRIP: NEGATIVE
NRBC BLD AUTO-RTO: 0 /100 WBC (ref 0–0.2)
PH UR STRIP.AUTO: 5.5 [PH] (ref 5–8)
PLATELET # BLD AUTO: 289 10*3/MM3 (ref 140–450)
PMV BLD AUTO: 8.8 FL (ref 6–12)
POTASSIUM SERPL-SCNC: 4.2 MMOL/L (ref 3.5–5.2)
PROT SERPL-MCNC: 7.8 G/DL (ref 6–8.5)
PROT UR QL STRIP: NEGATIVE
RBC # BLD AUTO: 5.31 10*6/MM3 (ref 3.77–5.28)
S PYO AG THROAT QL: POSITIVE
SODIUM SERPL-SCNC: 134 MMOL/L (ref 136–145)
SP GR UR STRIP: >1.03 (ref 1–1.03)
UROBILINOGEN UR QL STRIP: ABNORMAL
WBC NRBC COR # BLD: 5.39 10*3/MM3 (ref 3.4–10.8)

## 2023-05-28 PROCEDURE — 87086 URINE CULTURE/COLONY COUNT: CPT | Performed by: PHYSICIAN ASSISTANT

## 2023-05-28 PROCEDURE — 85025 COMPLETE CBC W/AUTO DIFF WBC: CPT | Performed by: PHYSICIAN ASSISTANT

## 2023-05-28 PROCEDURE — 83690 ASSAY OF LIPASE: CPT | Performed by: PHYSICIAN ASSISTANT

## 2023-05-28 PROCEDURE — 80053 COMPREHEN METABOLIC PANEL: CPT | Performed by: PHYSICIAN ASSISTANT

## 2023-05-28 PROCEDURE — 96372 THER/PROPH/DIAG INJ SC/IM: CPT

## 2023-05-28 PROCEDURE — 87880 STREP A ASSAY W/OPTIC: CPT | Performed by: PHYSICIAN ASSISTANT

## 2023-05-28 PROCEDURE — 99283 EMERGENCY DEPT VISIT LOW MDM: CPT

## 2023-05-28 PROCEDURE — 36415 COLL VENOUS BLD VENIPUNCTURE: CPT | Performed by: PHYSICIAN ASSISTANT

## 2023-05-28 PROCEDURE — 81001 URINALYSIS AUTO W/SCOPE: CPT | Performed by: PHYSICIAN ASSISTANT

## 2023-05-29 ENCOUNTER — HOSPITAL ENCOUNTER (EMERGENCY)
Facility: HOSPITAL | Age: 28
Discharge: HOME | End: 2023-05-29
Attending: EMERGENCY MEDICINE | Admitting: EMERGENCY MEDICINE

## 2023-05-29 VITALS
WEIGHT: 180.12 LBS | DIASTOLIC BLOOD PRESSURE: 90 MMHG | OXYGEN SATURATION: 100 % | TEMPERATURE: 98.3 F | RESPIRATION RATE: 14 BRPM | SYSTOLIC BLOOD PRESSURE: 122 MMHG | HEART RATE: 104 BPM | HEIGHT: 63 IN | BODY MASS INDEX: 31.91 KG/M2

## 2023-05-29 DIAGNOSIS — R11.2 NAUSEA AND VOMITING, UNSPECIFIED VOMITING TYPE: Primary | ICD-10-CM

## 2023-05-29 DIAGNOSIS — N39.0 URINARY TRACT INFECTION WITH HEMATURIA, SITE UNSPECIFIED: Primary | ICD-10-CM

## 2023-05-29 DIAGNOSIS — A59.9 TRICHOMONAS INFECTION: ICD-10-CM

## 2023-05-29 DIAGNOSIS — A59.01 TRICHOMONAS VAGINITIS: ICD-10-CM

## 2023-05-29 DIAGNOSIS — J02.0 STREP PHARYNGITIS: ICD-10-CM

## 2023-05-29 DIAGNOSIS — R31.9 URINARY TRACT INFECTION WITH HEMATURIA, SITE UNSPECIFIED: Primary | ICD-10-CM

## 2023-05-29 DIAGNOSIS — R10.30 LOWER ABDOMINAL PAIN: ICD-10-CM

## 2023-05-29 DIAGNOSIS — N76.0 GARDNERELLA VAGINALIS INFECTION: ICD-10-CM

## 2023-05-29 DIAGNOSIS — B96.89 GARDNERELLA VAGINALIS INFECTION: ICD-10-CM

## 2023-05-29 LAB
BACTERIA SPEC AEROBE CULT: ABNORMAL
BACTERIA UR QL AUTO: ABNORMAL /HPF
C TRACH RRNA CVX QL NAA+PROBE: NOT DETECTED
CANDIDA SPECIES: NEGATIVE
GARDNERELLA VAGINALIS: POSITIVE
HYALINE CASTS UR QL AUTO: ABNORMAL /LPF
N GONORRHOEA RRNA SPEC QL NAA+PROBE: NOT DETECTED
RBC # UR STRIP: ABNORMAL /HPF
REF LAB TEST METHOD: ABNORMAL
SQUAMOUS #/AREA URNS HPF: ABNORMAL /HPF
T VAGINALIS DNA VAG QL PROBE+SIG AMP: POSITIVE
TRICHOMONAS #/AREA URNS HPF: ABNORMAL /HPF
WBC # UR STRIP: ABNORMAL /HPF

## 2023-05-29 PROCEDURE — 87510 GARDNER VAG DNA DIR PROBE: CPT | Performed by: PHYSICIAN ASSISTANT

## 2023-05-29 PROCEDURE — 87591 N.GONORRHOEAE DNA AMP PROB: CPT | Performed by: PHYSICIAN ASSISTANT

## 2023-05-29 PROCEDURE — 87491 CHLMYD TRACH DNA AMP PROBE: CPT | Performed by: PHYSICIAN ASSISTANT

## 2023-05-29 PROCEDURE — 0 LIDOCAINE 1 % SOLUTION 10 ML VIAL: Performed by: PHYSICIAN ASSISTANT

## 2023-05-29 PROCEDURE — 87480 CANDIDA DNA DIR PROBE: CPT | Performed by: PHYSICIAN ASSISTANT

## 2023-05-29 PROCEDURE — 99283 EMERGENCY DEPT VISIT LOW MDM: CPT

## 2023-05-29 PROCEDURE — 25010000002 CEFTRIAXONE PER 250 MG: Performed by: PHYSICIAN ASSISTANT

## 2023-05-29 PROCEDURE — 63710000001 ONDANSETRON ODT 4 MG TABLET DISPERSIBLE: Performed by: NURSE PRACTITIONER

## 2023-05-29 PROCEDURE — 87660 TRICHOMONAS VAGIN DIR PROBE: CPT | Performed by: PHYSICIAN ASSISTANT

## 2023-05-29 PROCEDURE — 96372 THER/PROPH/DIAG INJ SC/IM: CPT

## 2023-05-29 RX ORDER — IBUPROFEN 400 MG/1
800 TABLET ORAL ONCE
Status: COMPLETED | OUTPATIENT
Start: 2023-05-29 | End: 2023-05-29

## 2023-05-29 RX ORDER — AZITHROMYCIN 250 MG/1
1000 TABLET, FILM COATED ORAL ONCE
Status: COMPLETED | OUTPATIENT
Start: 2023-05-29 | End: 2023-05-29

## 2023-05-29 RX ORDER — METRONIDAZOLE 500 MG/1
500 TABLET ORAL 2 TIMES DAILY
Qty: 14 TABLET | Refills: 0 | Status: SHIPPED | OUTPATIENT
Start: 2023-05-29 | End: 2023-06-05

## 2023-05-29 RX ORDER — AMOXICILLIN 875 MG/1
875 TABLET, COATED ORAL 2 TIMES DAILY
Qty: 20 TABLET | Refills: 0 | Status: SHIPPED | OUTPATIENT
Start: 2023-05-29 | End: 2023-06-08

## 2023-05-29 RX ORDER — ONDANSETRON 4 MG/1
4 TABLET, ORALLY DISINTEGRATING ORAL ONCE
Status: COMPLETED | OUTPATIENT
Start: 2023-05-29 | End: 2023-05-29

## 2023-05-29 RX ORDER — METRONIDAZOLE 500 MG/1
2000 TABLET ORAL ONCE
Status: COMPLETED | OUTPATIENT
Start: 2023-05-29 | End: 2023-05-29

## 2023-05-29 RX ORDER — PROMETHAZINE HYDROCHLORIDE 25 MG/1
25 SUPPOSITORY RECTAL EVERY 6 HOURS PRN
Qty: 10 SUPPOSITORY | Refills: 0 | Status: SHIPPED | OUTPATIENT
Start: 2023-05-29

## 2023-05-29 RX ORDER — ONDANSETRON 4 MG/1
4 TABLET, ORALLY DISINTEGRATING ORAL EVERY 4 HOURS PRN
Qty: 12 TABLET | Refills: 0 | Status: SHIPPED | OUTPATIENT
Start: 2023-05-29

## 2023-05-29 RX ADMIN — IBUPROFEN 800 MG: 400 TABLET, FILM COATED ORAL at 01:16

## 2023-05-29 RX ADMIN — AZITHROMYCIN MONOHYDRATE 1000 MG: 250 TABLET ORAL at 01:16

## 2023-05-29 RX ADMIN — ONDANSETRON 4 MG: 4 TABLET, ORALLY DISINTEGRATING ORAL at 03:41

## 2023-05-29 RX ADMIN — LIDOCAINE HYDROCHLORIDE 500 MG: 10 INJECTION, SOLUTION INFILTRATION; PERINEURAL at 01:18

## 2023-05-29 RX ADMIN — METRONIDAZOLE 2000 MG: 500 TABLET, FILM COATED ORAL at 00:25

## 2023-05-29 NOTE — ED PROVIDER NOTES
Time: 2:49 AM EDT  Date of encounter:  2023  Independent Historian/Clinical History and Information was obtained by:   Patient  Chief Complaint: Vomiting, possible medication reaction    History is limited by: N/A    History of Present Illness:  Patient is a 27 y.o. year old female who presents to the emergency department for evaluation of vomiting due to possible medication reaction.  Patient was seen here earlier and eloped after being treated for vaginal discharge and lower abdominal pain.  Was given prophylaxis antibiotics in the ED both IM and oral.  Patient states when she left here she suddenly felt nauseous and vomited 3 times back to back.  Patient states she had not eaten so she is unsure if she is having a reaction to the medication or if it was just from taking them on an empty stomach.  Patient states the nausea improved slightly after vomiting.  Patient denies any actual pain at this time    HPI    Patient Care Team  Primary Care Provider: Provider, No Known    Past Medical History:     Allergies   Allergen Reactions   • Morphine Anaphylaxis   • Peanut Oil Anaphylaxis   • Rocephin [Ceftriaxone] Nausea And Vomiting   • Sulfa Antibiotics Anaphylaxis   • Topiramate Anaphylaxis   • Iodine Other (See Comments)   • Latex Hives   • Shellfish-Derived Products Rash     Past Medical History:   Diagnosis Date   • Borderline personality disorder    • Diabetes mellitus    • Endocarditis    • Endocarditis    • Hepatitis C    • Mood disorder    • PTSD (post-traumatic stress disorder)    • Seizures      Past Surgical History:   Procedure Laterality Date   • BACK SURGERY     •  SECTION       No family history on file.    Home Medications:  Prior to Admission medications    Medication Sig Start Date End Date Taking? Authorizing Provider   albuterol sulfate  (90 Base) MCG/ACT inhaler Inhale 2 puffs Every 4 (Four) Hours As Needed for Wheezing or Shortness of Air. 23   Marci Lopez APRN    ondansetron ODT (ZOFRAN-ODT) 4 MG disintegrating tablet Place 1 tablet on the tongue Every 8 (Eight) Hours As Needed for Nausea. 8/17/21   Eyal Milan,    hydrOXYzine pamoate (VISTARIL) 50 MG capsule Take 1 capsule by mouth 2 (Two) Times a Day As Needed for Anxiety. Indications: Feeling Anxious 8/16/21 5/29/23  Gutierrez Palma MD   levETIRAcetam (KEPPRA) 500 MG tablet Take 1 tablet by mouth Every 12 (Twelve) Hours. Indications: Seizure 5/27/23 5/29/23  Edvin Dolan PA-C   omeprazole (PrilOSEC) 20 MG capsule Take 1 capsule by mouth Daily. 6/16/21 5/29/23  Bob Bruce DO   predniSONE (DELTASONE) 50 MG tablet Take 1 tablet by mouth Daily. 4/28/23 5/29/23  Marci Lopez APRN   traZODone (DESYREL) 100 MG tablet Take 1 tablet by mouth At Night As Needed for Sleep. Indications: Trouble Sleeping 8/16/21 5/29/23  Gutierrez Palma MD   valACYclovir (VALTREX) 1000 MG tablet Take 1 tablet by mouth Daily. Indications: Infection of the Skin and/or Soft Tissue 8/16/21 5/29/23  Gutierrez Palma MD        Social History:   Social History     Tobacco Use   • Smoking status: Never   • Smokeless tobacco: Never   Vaping Use   • Vaping Use: Never used   Substance Use Topics   • Alcohol use: Not Currently   • Drug use: Yes     Types: IV, Methamphetamines, Marijuana         Review of Systems:  Review of Systems   Constitutional: Negative for chills and fever.   HENT: Negative for congestion, ear pain and sore throat.    Eyes: Negative for pain.   Respiratory: Negative for cough, chest tightness and shortness of breath.    Cardiovascular: Negative for chest pain.   Gastrointestinal: Positive for nausea and vomiting. Negative for abdominal pain and diarrhea.   Genitourinary: Negative for flank pain and hematuria.   Musculoskeletal: Negative for joint swelling.   Skin: Negative for pallor.   Neurological: Negative for seizures and headaches.   Hematological: Negative.    Psychiatric/Behavioral: Negative.    All other systems reviewed  "and are negative.       Physical Exam:  /90   Pulse 104   Temp 98.3 °F (36.8 °C) (Oral)   Resp 14   Ht 160 cm (63\")   Wt 81.7 kg (180 lb 1.9 oz)   LMP 05/27/2023 (Exact Date)   SpO2 100%   BMI 31.91 kg/m²     Physical Exam  Vitals and nursing note reviewed.   Constitutional:       General: She is not in acute distress.     Appearance: Normal appearance. She is not toxic-appearing.   HENT:      Head: Normocephalic and atraumatic.      Right Ear: Tympanic membrane, ear canal and external ear normal.      Left Ear: Tympanic membrane, ear canal and external ear normal.      Nose: Nose normal.      Mouth/Throat:      Mouth: Mucous membranes are moist.   Eyes:      General: No scleral icterus.     Conjunctiva/sclera: Conjunctivae normal.      Pupils: Pupils are equal, round, and reactive to light.   Cardiovascular:      Rate and Rhythm: Normal rate and regular rhythm.      Pulses: Normal pulses.      Heart sounds: Normal heart sounds.   Pulmonary:      Effort: Pulmonary effort is normal. No respiratory distress.      Breath sounds: Normal breath sounds.   Abdominal:      General: Bowel sounds are normal.      Palpations: Abdomen is soft.      Tenderness: There is no abdominal tenderness.   Musculoskeletal:         General: Normal range of motion.      Cervical back: Normal range of motion and neck supple.   Skin:     General: Skin is warm and dry.   Neurological:      Mental Status: She is alert and oriented to person, place, and time.   Psychiatric:         Mood and Affect: Mood normal.         Behavior: Behavior normal.              Procedures:  Procedures      Medical Decision Making:      Comorbidities that affect care:    Diabetes    External Notes reviewed:    Previous ED Note: Patient was just seen here earlier tonight.  Patient eloped.  Note was reviewed for lab testing but she did not await the results for and will be treated for as well during this visit      The following orders were placed and " all results were independently analyzed by me:  No orders of the defined types were placed in this encounter.      Medications Given in the Emergency Department:  Medications   ondansetron ODT (ZOFRAN-ODT) disintegrating tablet 4 mg (4 mg Oral Given 5/29/23 0341)        ED Course:    ED Course as of 05/31/23 2243   Mon May 29, 2023   0410 Patient has tolerated p.o. [DS]      ED Course User Index  [DS] Geno Hinton APRN       Labs:    Lab Results (last 24 hours)     ** No results found for the last 24 hours. **           Imaging:    No Radiology Exams Resulted Within Past 24 Hours      Differential Diagnosis and Discussion:    Vomiting: Differential diagnosis includes but is not limited to migraine, labyrinthine disorders, psychogenic, metabolic and endocrine causes, peptic ulcer, gastric outlet obstruction, gastritis, gastroenteritis, appendicitis, intestinal obstruction, paralytic ileus, food poisoning, cholecystitis, acute hepatitis, acute pancreatitis, acute febrile illness, and myocardial infarction.      MDM  Number of Diagnoses or Management Options  Gardnerella vaginalis infection  Nausea and vomiting, unspecified vomiting type  Strep pharyngitis  Trichomonas vaginitis  Diagnosis management comments: The patient comes to the ED for evaluation of vomiting. Emesis is much improved in the ED. The patient was given antiemetics in the ED. The patient is resting comfortably and feels better, is alert and in no distress. Repeat examination is unremarkable and benign; in particular, there's no discomfort at McBurney's point. The history, exam, diagnostic testing, and current condition does not suggest acute appendicitis, bowel obstruction, acute cholecystitis, bowel perforation, major gastrointestinal bleeding, severe diverticulitis, abdominal aortic aneurysm, mesenteric ischemia, volvulus, sepsis, or other significant pathology that warrants further testing, continued ED treatment, admission, for surgical  evaluation at this point. The vital signs have been stable. Bloodwork performed shows no signs of acute renal failure. The patient does not have uncontrollable pain, intractable vomiting, or other significant symptoms. The patient is now able to tolerate po intake in the ED and has passed a po challenge. The patient's condition is stable and appropriate for discharge from the emergency department.         Amount and/or Complexity of Data Reviewed  Tests in the medicine section of CPT®: reviewed and ordered  Decide to obtain previous medical records or to obtain history from someone other than the patient: yes  Review and summarize past medical records: yes (ED visit from earlier was reviewed including lab results that had not resulted.  Positive for trichomonas and Gardnerella as well as strep)    Risk of Complications, Morbidity, and/or Mortality  Presenting problems: low  Management options: low    Patient Progress  Patient progress: stable         Patient Care Considerations:    LABS: I considered ordering labs, however Patient was just seen here earlier today and only had 3 episodes of vomiting which have since resolved.      Consultants/Shared Management Plan:    None    Social Determinants of Health:    Patient is independent, reliable, and has access to care.       Disposition and Care Coordination:    Discharged: The patient is suitable and stable for discharge with no need for consideration of observation or admission.    I have explained the patient´s condition, diagnoses and treatment plan based on the information available to me at this time. I have answered questions and addressed any concerns. The patient has a good  understanding of the patient´s diagnosis, condition, and treatment plan as can be expected at this point. The vital signs have been stable. The patient´s condition is stable and appropriate for discharge from the emergency department.      The patient will pursue further outpatient  evaluation with the primary care physician or other designated or consulting physician as outlined in the discharge instructions. They are agreeable to this plan of care and follow-up instructions have been explained in detail. The patient has received these instructions in written format and have expressed an understanding of the discharge instructions. The patient is aware that any significant change in condition or worsening of symptoms should prompt an immediate return to this or the closest emergency department or call to 911.  I have explained discharge medications and the need for follow up with the patient/caretakers. This was also printed in the discharge instructions. Patient was discharged with the following medications and follow up:      Medication List      New Prescriptions    amoxicillin 875 MG tablet  Commonly known as: AMOXIL  Take 1 tablet by mouth 2 (Two) Times a Day for 10 days.     metroNIDAZOLE 500 MG tablet  Commonly known as: FLAGYL  Take 1 tablet by mouth 2 (Two) Times a Day for 7 days.     promethazine 25 MG suppository  Commonly known as: Phenergan  Insert 1 suppository into the rectum Every 6 (Six) Hours As Needed for Nausea or Vomiting.        Changed    ondansetron ODT 4 MG disintegrating tablet  Commonly known as: ZOFRAN-ODT  Place 1 tablet on the tongue Every 4 (Four) Hours As Needed for Nausea or Vomiting.  What changed:   · when to take this  · reasons to take this           Where to Get Your Medications      These medications were sent to [facility]/pharmacy #[id number] - [place], [place] - [address] - [phone] [facility] [phone] FX  [address] [place] [address]    Hours: 24-hours Phone: [phone]   · amoxicillin 875 MG tablet  · metroNIDAZOLE 500 MG tablet  · ondansetron ODT 4 MG disintegrating tablet  · promethazine 25 MG suppository      obgyn of choice      As needed    Provider, No Known  [facility] [place] [address]             Final diagnoses:    Nausea and vomiting, unspecified vomiting type   Strep pharyngitis   Gardnerella vaginalis infection   Trichomonas vaginitis        ED Disposition     ED Disposition   Discharge    Condition   Stable    Comment   --             This medical record created using voice recognition software.           Geno Hinton, APRN  05/31/23 9268

## 2023-05-29 NOTE — ED NOTES
"THIS RN OFFERED THE OPTION TO SELF SWAB FOR SEXUALLY TRANSMITTED INFECTIONS. PATIENT STATES, \"I'D RATHER WAIT UNTIL I CAN GET TO THE BACK AND HAVE A DOCTOR DO IT FOR ME\".  "

## 2023-05-29 NOTE — ED PROVIDER NOTES
Time: 11:12 PM EDT  Date of encounter:  2023  Independent Historian/Clinical History and Information was obtained by:   Patient  Chief Complaint   Patient presents with   • Abdominal Pain       History is limited by: N/A    History of Present Illness:  Patient is a 27 y.o. year old female who presents to the emergency department for evaluation of lower abdominal pain, nausea, diarrhea and sore throat. Is at commitment house. Withdrawing from methamphetamines and benzos. Last use 4 days ago. Seen in ED yesterday and day before. Endorses sore throat and exposure to strep. Also has dysuria. Denies HI/SI, hallucinations. (Sandro Witt PA-C provider in triage 11:13 PM EDT )       History provided by:  Patient  Abdominal Pain  Pain location:  LLQ, RLQ and suprapubic  Pain quality: aching, cramping and sharp    Pain radiates to:  Does not radiate  Pain severity:  Moderate  Onset quality:  Gradual  Duration:  3 days  Timing:  Constant  Progression:  Unchanged  Chronicity:  New  Associated symptoms: diarrhea, dysuria, nausea, sore throat and vaginal discharge    Associated symptoms: no chest pain, no chills, no cough, no fever, no hematuria, no shortness of breath and no vomiting        Patient Care Team  Primary Care Provider: Provider, No Known    Past Medical History:     Allergies   Allergen Reactions   • Morphine Anaphylaxis   • Peanut Oil Anaphylaxis   • Rocephin [Ceftriaxone] Nausea And Vomiting   • Sulfa Antibiotics Anaphylaxis   • Topiramate Anaphylaxis   • Iodine Other (See Comments)   • Latex Hives   • Shellfish-Derived Products Rash     Past Medical History:   Diagnosis Date   • Borderline personality disorder    • Diabetes mellitus    • Endocarditis    • Endocarditis    • Hepatitis C    • Mood disorder    • PTSD (post-traumatic stress disorder)    • Seizures      Past Surgical History:   Procedure Laterality Date   • BACK SURGERY     •  SECTION       History reviewed. No pertinent family  history.    Home Medications:  Prior to Admission medications    Medication Sig Start Date End Date Taking? Authorizing Provider   albuterol sulfate  (90 Base) MCG/ACT inhaler Inhale 2 puffs Every 4 (Four) Hours As Needed for Wheezing or Shortness of Air. 4/28/23   Marci Lopez APRN   hydrOXYzine pamoate (VISTARIL) 50 MG capsule Take 1 capsule by mouth 2 (Two) Times a Day As Needed for Anxiety. Indications: Feeling Anxious 8/16/21   Gutierrez Palma MD   levETIRAcetam (KEPPRA) 500 MG tablet Take 1 tablet by mouth Every 12 (Twelve) Hours. Indications: Seizure 5/27/23   Edvin Dolan PA-C   omeprazole (PrilOSEC) 20 MG capsule Take 1 capsule by mouth Daily. 6/16/21   Bob Bruce DO   ondansetron ODT (ZOFRAN-ODT) 4 MG disintegrating tablet Place 1 tablet on the tongue Every 8 (Eight) Hours As Needed for Nausea. 8/17/21   Eyal Milan DO   predniSONE (DELTASONE) 50 MG tablet Take 1 tablet by mouth Daily. 4/28/23   Marci Lopez APRN   traZODone (DESYREL) 100 MG tablet Take 1 tablet by mouth At Night As Needed for Sleep. Indications: Trouble Sleeping 8/16/21   Gutierrez Palma MD   valACYclovir (VALTREX) 1000 MG tablet Take 1 tablet by mouth Daily. Indications: Infection of the Skin and/or Soft Tissue 8/16/21   Gutierrez Palma MD        Social History:   Social History     Tobacco Use   • Smoking status: Never   • Smokeless tobacco: Never   Vaping Use   • Vaping Use: Never used   Substance Use Topics   • Alcohol use: Not Currently   • Drug use: Yes     Types: IV, Methamphetamines, Marijuana         Review of Systems:  Review of Systems   Constitutional: Negative for chills and fever.   HENT: Positive for sore throat. Negative for congestion and ear pain.    Eyes: Negative for pain.   Respiratory: Negative for cough, chest tightness and shortness of breath.    Cardiovascular: Negative for chest pain.   Gastrointestinal: Positive for abdominal pain, diarrhea and nausea. Negative for vomiting.   Genitourinary:  "Positive for dysuria and vaginal discharge. Negative for flank pain and hematuria.   Musculoskeletal: Negative for joint swelling.   Skin: Negative for pallor.   Neurological: Negative for seizures and headaches.   Psychiatric/Behavioral: The patient is nervous/anxious.    All other systems reviewed and are negative.       Physical Exam:  /95 (BP Location: Left arm, Patient Position: Sitting)   Pulse 107   Temp 99 °F (37.2 °C) (Oral)   Resp 19   Ht 160 cm (63\")   Wt 81.7 kg (180 lb 1.9 oz)   LMP 05/23/2023 (Exact Date)   SpO2 97%   Breastfeeding No   BMI 31.91 kg/m²     Physical Exam  Vitals and nursing note reviewed.   Constitutional:       General: She is not in acute distress.     Appearance: Normal appearance. She is not toxic-appearing.   HENT:      Head: Normocephalic and atraumatic.      Mouth/Throat:      Mouth: Mucous membranes are moist.   Eyes:      General: No scleral icterus.  Cardiovascular:      Rate and Rhythm: Regular rhythm. Tachycardia present.      Pulses: Normal pulses.      Heart sounds: Normal heart sounds.   Pulmonary:      Effort: Pulmonary effort is normal. No respiratory distress.      Breath sounds: Normal breath sounds.   Abdominal:      General: Bowel sounds are normal. There is no distension.      Palpations: Abdomen is soft.      Tenderness: There is no abdominal tenderness. There is no right CVA tenderness or left CVA tenderness.      Hernia: No hernia is present.   Genitourinary:     Vagina: Vaginal discharge ( Scant yellow) present.      Cervix: Normal.      Adnexa:         Right: No tenderness.          Left: No tenderness.     Musculoskeletal:         General: Normal range of motion.      Cervical back: Normal range of motion and neck supple.   Skin:     General: Skin is warm and dry.   Neurological:      Mental Status: She is alert and oriented to person, place, and time. Mental status is at baseline.   Psychiatric:      Comments: Patient mildly anxious.  Patient " has noted suicidal or homicidal ideation              Procedures:  Procedures      Medical Decision Making:      Comorbidities that affect care:    Hepatitis and seizures.  History of PTSD depression and mood disorder, Diabetes    External Notes reviewed:    Previous ED Note: Patient seen here yesterday for homelessness and substance abuse      The following orders were placed and all results were independently analyzed by me:  Orders Placed This Encounter   Procedures   • Rapid Strep A Screen - Swab, Throat   • Urine Culture - Urine,   • Chlamydia trachomatis, Neisseria gonorrhoeae, PCR - Swab, Cervix   • Gardnerella vaginalis, Trichomonas vaginalis, Candida albicans, DNA - Swab, Vagina   • Comprehensive Metabolic Panel   • Lipase   • Urinalysis With Culture If Indicated - Urine, Clean Catch   • CBC Auto Differential   • Urinalysis, Microscopic Only - Urine, Clean Catch   • CBC & Differential       Medications Given in the Emergency Department:  Medications   metroNIDAZOLE (FLAGYL) tablet 2,000 mg (2,000 mg Oral Given 5/29/23 0025)   azithromycin (ZITHROMAX) tablet 1,000 mg (1,000 mg Oral Given 5/29/23 0116)   cefTRIAXone (ROCEPHIN) 250 mg/ml in lidocaine 1% IM syringe (500 mg vial) (500 mg Intramuscular Given 5/29/23 0118)   ibuprofen (ADVIL,MOTRIN) tablet 800 mg (800 mg Oral Given 5/29/23 0116)        ED Course:    The patient was initially evaluated in the triage area where orders were placed. The patient was later dispositioned by SEB Keller.      The patient was advised to stay for completion of workup which includes but is not limited to communication of labs and radiological results, reassessment and plan. The patient was advised that leaving prior to disposition by a provider could result in critical findings that are not communicated to the patient.          Labs:    Lab Results (last 24 hours)     Procedure Component Value Units Date/Time    Rapid Strep A Screen - Swab, Throat [[id number]]   (Abnormal) Collected: 05/28/23 2318    Specimen: Swab from Throat Updated: 05/28/23 2344     Strep A Ag Positive    CBC & Differential [[id number]]  (Abnormal) Collected: 05/28/23 2324    Specimen: Blood Updated: 05/28/23 2344    Narrative:      The following orders were created for panel order CBC & Differential.  Procedure                               Abnormality         Status                     ---------                               -----------         ------                     CBC Auto Differential[[id number]]        Abnormal            Final result               Scan Slide[[id number]]                                                                    Please view results for these tests on the individual orders.    Comprehensive Metabolic Panel [[id number]]  (Abnormal) Collected: 05/28/23 2324    Specimen: Blood Updated: 05/28/23 2357     Glucose 362 mg/dL      BUN 11 mg/dL      Creatinine 0.72 mg/dL      Sodium 134 mmol/L      Potassium 4.2 mmol/L      Chloride 101 mmol/L      CO2 20.5 mmol/L      Calcium 9.5 mg/dL      Total Protein 7.8 g/dL      Albumin 4.1 g/dL      ALT (SGPT) 17 U/L      AST (SGOT) 12 U/L      Alkaline Phosphatase 83 U/L      Total Bilirubin 0.2 mg/dL      Globulin 3.7 gm/dL      A/G Ratio 1.1 g/dL      BUN/Creatinine Ratio 15.3     Anion Gap 12.5 mmol/L      eGFR 116.2 mL/min/1.73     Narrative:      GFR Normal >60  Chronic Kidney Disease <60  Kidney Failure <15      Lipase [[id number]]  (Normal) Collected: 05/28/23 2324    Specimen: Blood Updated: 05/28/23 2357     Lipase 46 U/L     CBC Auto Differential [[id number]]  (Abnormal) Collected: 05/28/23 2324    Specimen: Blood Updated: 05/28/23 2344     WBC 5.39 10*3/mm3      RBC 5.31 10*6/mm3      Hemoglobin 12.8 g/dL      Hematocrit 39.5 %      MCV 74.4 fL      MCH 24.1 pg      MCHC 32.4 g/dL      RDW 14.6 %      RDW-SD 38.3 fl      MPV 8.8 fL      Platelets 289 10*3/mm3      Neutrophil % 44.4 %      Lymphocyte % 46.8 %      Monocyte % 6.3 %       Eosinophil % 1.9 %      Basophil % 0.4 %      Immature Grans % 0.2 %      Neutrophils, Absolute 2.40 10*3/mm3      Lymphocytes, Absolute 2.52 10*3/mm3      Monocytes, Absolute 0.34 10*3/mm3      Eosinophils, Absolute 0.10 10*3/mm3      Basophils, Absolute 0.02 10*3/mm3      Immature Grans, Absolute 0.01 10*3/mm3      nRBC 0.0 /100 WBC     Urinalysis With Culture If Indicated - Urine, Clean Catch [[id number]]  (Abnormal) Collected: 05/28/23 2330    Specimen: Urine, Clean Catch Updated: 05/28/23 2341     Color, UA Yellow     Appearance, UA Cloudy     pH, UA 5.5     Specific Gravity, UA >1.030     Glucose, UA >=1000 mg/dL (3+)     Ketones, UA Negative     Bilirubin, UA Negative     Blood, UA Small (1+)     Protein, UA Negative     Leuk Esterase, UA Negative     Nitrite, UA Negative     Urobilinogen, UA 0.2 E.U./dL    Narrative:      In absence of clinical symptoms, the presence of pyuria, bacteria, and/or nitrites on the urinalysis result does not correlate with infection.    Urinalysis, Microscopic Only - Urine, Clean Catch [[id number]]  (Abnormal) Collected: 05/28/23 2330    Specimen: Urine, Clean Catch Updated: 05/29/23 0003     RBC, UA 0-2 /HPF      WBC, UA 13-20 /HPF      Bacteria, UA Trace /HPF      Squamous Epithelial Cells, UA 3-6 /HPF      Hyaline Casts, UA None Seen /LPF      Trichomonas, UA Small/1+ /HPF      Methodology Manual Light Microscopy    Urine Culture - Urine, Urine, Clean Catch [[id number]] Collected: 05/28/23 2330    Specimen: Urine, Clean Catch Updated: 05/29/23 0003    Chlamydia trachomatis, Neisseria gonorrhoeae, PCR - Swab, Cervix [[id number]]  (Normal) Collected: 05/29/23 0106    Specimen: Swab from Cervix Updated: 05/29/23 0307     Chlamydia DNA by PCR Not Detected     Neisseria gonorrhoeae by PCR Not Detected    Gardnerella vaginalis, Trichomonas vaginalis, Candida albicans, DNA - Swab, Vagina [[id number]]  (Abnormal) Collected: 05/29/23 0106    Specimen: Swab from Vagina Updated: 05/29/23  0219     GARDNERELLA VAGINALIS Positive     TRICHOMONAS VAGINALIS Positive     CANDIDA SPECIES Negative           Imaging:    No Radiology Exams Resulted Within Past 24 Hours      Differential Diagnosis and Discussion:      Abdominal Pain: Based on the patient's signs and symptoms, I considered abdominal aortic aneurysm, small bowel obstruction, pancreatitis, acute cholecystitis, acute appendecitis, peptic ulcer disease, gastritis, colitis, endocrine disorders, irritable bowel syndrome and other differential diagnosis an etiology of the patient's abdominal pain.  Vaginal Discharge: Differential diagnosis includes but is not limited to bacterial vaginosis, candidiasis, trichomonas vaginitis, cervicitis, rectovaginal fistula, irritants and allergens, foreign body, pelvic inflammatory disease.    All labs were reviewed and interpreted by me.    MDM  Number of Diagnoses or Management Options  Lower abdominal pain  Trichomonas infection  Urinary tract infection with hematuria, site unspecified  Diagnosis management comments: Patient eloped prior to work-up       Amount and/or Complexity of Data Reviewed  Clinical lab tests: reviewed and ordered  Tests in the medicine section of CPT®: ordered and reviewed  Decide to obtain previous medical records or to obtain history from someone other than the patient: yes  Review and summarize past medical records: yes (Numerous ED visits reviewed.  Last 1 was from yesterday patient was diagnosed with homelessness and substance abuse)    Risk of Complications, Morbidity, and/or Mortality  Presenting problems: moderate  Diagnostic procedures: low  Management options: low    Patient Progress  Patient progress: stable         Patient Care Considerations:    CT ABDOMEN AND PELVIS: I considered ordering a CT scan of the abdomen and pelvis however Patient had no focal right lower quadrant tenderness and was nontender on pelvic exam      Consultants/Shared Management Plan:    None    Social  Determinants of Health:    Patient is homeless. Nursing staff was instructed to give patient adequate resources to care access.       Disposition and Care Coordination:    Eloped: This patient has left the emergency department or waiting room with no communication to myself, nursing or administrative staff. There was no opportunity to discuss the patient's decision to leave, provide medical advice or discuss alternatives to. The staff has made efforts to locate patient without success.        Final diagnoses:   Urinary tract infection with hematuria, site unspecified   Lower abdominal pain   Trichomonas infection        ED Disposition     ED Disposition   Eloped    Condition   --    Comment   --             This medical record created using voice recognition software.           Geno Hinton, APRN  05/29/23 0641

## 2023-05-30 LAB — BACTERIA SPEC AEROBE CULT: NORMAL

## 2023-08-22 ENCOUNTER — APPOINTMENT (OUTPATIENT)
Dept: CT IMAGING | Facility: HOSPITAL | Age: 28
End: 2023-08-22
Payer: MEDICAID

## 2023-08-22 ENCOUNTER — HOSPITAL ENCOUNTER (OUTPATIENT)
Facility: HOSPITAL | Age: 28
Discharge: HOME | End: 2023-08-22
Attending: EMERGENCY MEDICINE | Admitting: EMERGENCY MEDICINE
Payer: MEDICAID

## 2023-08-22 ENCOUNTER — APPOINTMENT (OUTPATIENT)
Dept: GENERAL RADIOLOGY | Facility: HOSPITAL | Age: 28
End: 2023-08-22
Payer: MEDICAID

## 2023-08-22 VITALS
SYSTOLIC BLOOD PRESSURE: 132 MMHG | DIASTOLIC BLOOD PRESSURE: 65 MMHG | OXYGEN SATURATION: 100 % | TEMPERATURE: 98.9 F | HEART RATE: 90 BPM | HEIGHT: 63 IN | WEIGHT: 178 LBS | BODY MASS INDEX: 31.54 KG/M2 | RESPIRATION RATE: 16 BRPM

## 2023-08-22 DIAGNOSIS — B35.3 TINEA PEDIS OF BOTH FEET: ICD-10-CM

## 2023-08-22 DIAGNOSIS — T14.8XXA AVULSION OF SKIN: Primary | ICD-10-CM

## 2023-08-22 PROCEDURE — 73630 X-RAY EXAM OF FOOT: CPT

## 2023-08-22 PROCEDURE — G0463 HOSPITAL OUTPT CLINIC VISIT: HCPCS | Performed by: PHYSICIAN ASSISTANT

## 2023-08-22 RX ORDER — TERBINAFINE HYDROCHLORIDE 250 MG/1
250 TABLET ORAL DAILY
Qty: 30 TABLET | Refills: 0 | Status: SHIPPED | OUTPATIENT
Start: 2023-08-22 | End: 2023-09-21

## 2023-08-22 RX ORDER — CEFUROXIME AXETIL 500 MG/1
500 TABLET ORAL 2 TIMES DAILY
Qty: 14 TABLET | Refills: 0 | Status: SHIPPED | OUTPATIENT
Start: 2023-08-22 | End: 2023-08-29

## 2023-08-23 NOTE — FSED PROVIDER NOTE
Subjective   History of Present Illness  Patient is 27-year-old female who is centric rehab.  She is also a type I diabetic.  She has had an injury on her left foot from April when she stepped on some glass.  She was concerned that it might still have some glass in it because she thought it was infected so she pulled the skin off.  She says she had pus come out of it after she did it.  However she is concerned that it still infected.  Always wear shoes.  She is also concerned that she may have athlete's foot.    Review of Systems   Skin:  Positive for wound.   All other systems reviewed and are negative.    Past Medical History:   Diagnosis Date    Borderline personality disorder     Diabetes mellitus     Endocarditis     Endocarditis     Hepatitis C     Mood disorder     PTSD (post-traumatic stress disorder)     Seizures        Allergies   Allergen Reactions    Morphine Anaphylaxis    Peanut Oil Anaphylaxis    Rocephin [Ceftriaxone] Nausea And Vomiting    Sulfa Antibiotics Anaphylaxis    Topiramate Anaphylaxis    Iodine Other (See Comments)    Latex Hives    Shellfish-Derived Products Rash       Past Surgical History:   Procedure Laterality Date    BACK SURGERY       SECTION         History reviewed. No pertinent family history.    Social History     Socioeconomic History    Marital status: Single   Tobacco Use    Smoking status: Never    Smokeless tobacco: Never   Vaping Use    Vaping Use: Never used   Substance and Sexual Activity    Alcohol use: Not Currently    Drug use: Yes     Types: IV, Methamphetamines, Marijuana    Sexual activity: Defer           Objective   Physical Exam  Vitals reviewed.   Constitutional:       Appearance: Normal appearance.   Eyes:      Conjunctiva/sclera: Conjunctivae normal.   Cardiovascular:      Rate and Rhythm: Normal rate.      Pulses: Normal pulses.   Pulmonary:      Effort: Pulmonary effort is normal.   Musculoskeletal:         General: Normal range of motion.       Cervical back: Normal range of motion.   Skin:     General: Skin is warm and dry.      Comments: Open wound on bottom of left foot near the heel, lateral which appears clean.  It is about 1 x 1-1/2 cm.  No drainage or redness.  There are some areas of peeling skin on both her feet as well.   Neurological:      General: No focal deficit present.   Psychiatric:         Mood and Affect: Mood normal.       Procedures           ED Course                                           Medical Decision Making  Patient had x-ray of the left foot as she is concerned there is glass in there.  I do not appreciate any and I told her I would not see that there were small bits.  Is from an injury back in April.  I do not feel any pieces of glass in the skin.  She has pulled off the skin on the bottom of her left foot approximately 1 x 1.5 cm.  The skin under it appears healthy.  However, she does have some dry peeling skin in other areas of both feet that looks like the start of tinea.  I do not appreciate any infection at this time but she is a type I diabetic and has a history of drug abuse and is now living in rehab center.  I have concern for this getting infected due to decreased immune system of a diabetic.  The feet are dry and I talked about keeping them dry.  I did write her for Ceftin to take twice a day and terbinafine to take once a day.  She says she has doctors to follow-up through the center.  When she is out if she does not get doctors to follow-up with I gave her information to find family doctors in the area.  I told her return to emergency room if she has any concerns especially she is concerned that the foot is infected.  I also told her she should keep the foot bandaged and clean and dry to help prevent it from getting infected.    Problems Addressed:  Avulsion of skin: complicated acute illness or injury  Tinea pedis of both feet: complicated acute illness or injury    Amount and/or Complexity of Data  Reviewed  Radiology: ordered.    Risk  Prescription drug management.        Final diagnoses:   Avulsion of skin   Tinea pedis of both feet       ED Disposition  ED Disposition       ED Disposition   Discharge    Condition   Stable    Comment   --               [facility] - [place] [address]  [phone]  Call            Medication List        New Prescriptions      cefuroxime 500 MG tablet  Commonly known as: CEFTIN  Take 1 tablet by mouth 2 (Two) Times a Day for 7 days.     terbinafine 250 MG tablet  Commonly known as: LamISIL  Take 1 tablet by mouth Daily for 30 days.               Where to Get Your Medications        These medications were sent to [facility] [place] [place] [id number] [facility] - [phone]  - [phone] [address] [address]      Phone: [phone]   cefuroxime 500 MG tablet  terbinafine 250 MG tablet